# Patient Record
Sex: FEMALE | Race: WHITE | NOT HISPANIC OR LATINO | Employment: FULL TIME | URBAN - METROPOLITAN AREA
[De-identification: names, ages, dates, MRNs, and addresses within clinical notes are randomized per-mention and may not be internally consistent; named-entity substitution may affect disease eponyms.]

---

## 2018-01-17 NOTE — MISCELLANEOUS
Provider Comments   Patient an no show for 07/06/16 OV; [facility] to [facility]        Signatures   Electronically signed by : Mayda [facility]; Jul 15 2016  2:27PM EST                       (Author)    Electronically signed by : SANDRA Navarro ; Jul 15 2016  3:37PM EST                       (Author)

## 2018-05-12 ENCOUNTER — APPOINTMENT (EMERGENCY)
Dept: ULTRASOUND IMAGING | Facility: HOSPITAL | Age: 23
End: 2018-05-12
Payer: COMMERCIAL

## 2018-05-12 ENCOUNTER — HOSPITAL ENCOUNTER (EMERGENCY)
Facility: HOSPITAL | Age: 23
Discharge: HOME | End: 2018-05-12
Attending: EMERGENCY MEDICINE | Admitting: EMERGENCY MEDICINE
Payer: COMMERCIAL

## 2018-05-12 VITALS
WEIGHT: 190 LBS | SYSTOLIC BLOOD PRESSURE: 98 MMHG | DIASTOLIC BLOOD PRESSURE: 63 MMHG | HEIGHT: 65 IN | BODY MASS INDEX: 31.65 KG/M2 | RESPIRATION RATE: 18 BRPM | TEMPERATURE: 97 F | HEART RATE: 74 BPM | OXYGEN SATURATION: 99 %

## 2018-05-12 DIAGNOSIS — K29.70 GASTRITIS: Primary | ICD-10-CM

## 2018-05-12 DIAGNOSIS — R10.13 EPIGASTRIC PAIN: ICD-10-CM

## 2018-05-12 LAB
ABO GROUP BLD: NORMAL
ALBUMIN SERPL BCP-MCNC: 4 G/DL (ref 3.5–5)
ALP SERPL-CCNC: 54 U/L (ref 46–116)
ALT SERPL W P-5'-P-CCNC: 23 U/L (ref 12–78)
ANION GAP SERPL CALCULATED.3IONS-SCNC: 8 MMOL/L (ref 4–13)
APTT PPP: 26 SECONDS (ref 23–35)
AST SERPL W P-5'-P-CCNC: 17 U/L (ref 5–45)
BASOPHILS # BLD AUTO: 0.02 THOUSANDS/ΜL (ref 0–0.1)
BASOPHILS NFR BLD AUTO: 0 % (ref 0–1)
BILIRUB SERPL-MCNC: 0.3 MG/DL (ref 0.2–1)
BLD GP AB SCN SERPL QL: NEGATIVE
BUN SERPL-MCNC: 13 MG/DL (ref 5–25)
CALCIUM SERPL-MCNC: 9.1 MG/DL (ref 8.3–10.1)
CHLORIDE SERPL-SCNC: 103 MMOL/L (ref 100–108)
CO2 SERPL-SCNC: 30 MMOL/L (ref 21–32)
CREAT SERPL-MCNC: 0.71 MG/DL (ref 0.6–1.3)
EOSINOPHIL # BLD AUTO: 0.01 THOUSAND/ΜL (ref 0–0.61)
EOSINOPHIL NFR BLD AUTO: 0 % (ref 0–6)
ERYTHROCYTE [DISTWIDTH] IN BLOOD BY AUTOMATED COUNT: 12.4 % (ref 11.6–15.1)
GFR SERPL CREATININE-BSD FRML MDRD: 120 ML/MIN/1.73SQ M
GLUCOSE SERPL-MCNC: 115 MG/DL (ref 65–140)
HCG SERPL QL: NEGATIVE
HCT VFR BLD AUTO: 39.2 % (ref 34.8–46.1)
HGB BLD-MCNC: 13.5 G/DL (ref 11.5–15.4)
INR PPP: 1.05 (ref 0.86–1.16)
LIPASE SERPL-CCNC: 81 U/L (ref 73–393)
LYMPHOCYTES # BLD AUTO: 1.59 THOUSANDS/ΜL (ref 0.6–4.47)
LYMPHOCYTES NFR BLD AUTO: 16 % (ref 14–44)
MCH RBC QN AUTO: 31.9 PG (ref 26.8–34.3)
MCHC RBC AUTO-ENTMCNC: 34.4 G/DL (ref 31.4–37.4)
MCV RBC AUTO: 93 FL (ref 82–98)
MONOCYTES # BLD AUTO: 0.78 THOUSAND/ΜL (ref 0.17–1.22)
MONOCYTES NFR BLD AUTO: 8 % (ref 4–12)
NEUTROPHILS # BLD AUTO: 7.72 THOUSANDS/ΜL (ref 1.85–7.62)
NEUTS SEG NFR BLD AUTO: 76 % (ref 43–75)
PLATELET # BLD AUTO: 272 THOUSANDS/UL (ref 149–390)
PMV BLD AUTO: 10.1 FL (ref 8.9–12.7)
POTASSIUM SERPL-SCNC: 3.4 MMOL/L (ref 3.5–5.3)
PROT SERPL-MCNC: 8 G/DL (ref 6.4–8.2)
PROTHROMBIN TIME: 13.5 SECONDS (ref 12.1–14.4)
RBC # BLD AUTO: 4.23 MILLION/UL (ref 3.81–5.12)
RH BLD: POSITIVE
SODIUM SERPL-SCNC: 141 MMOL/L (ref 136–145)
SPECIMEN EXPIRATION DATE: NORMAL
WBC # BLD AUTO: 10.12 THOUSAND/UL (ref 4.31–10.16)

## 2018-05-12 PROCEDURE — 96375 TX/PRO/DX INJ NEW DRUG ADDON: CPT

## 2018-05-12 PROCEDURE — 93005 ELECTROCARDIOGRAM TRACING: CPT

## 2018-05-12 PROCEDURE — 99285 EMERGENCY DEPT VISIT HI MDM: CPT

## 2018-05-12 PROCEDURE — 96376 TX/PRO/DX INJ SAME DRUG ADON: CPT

## 2018-05-12 PROCEDURE — 84703 CHORIONIC GONADOTROPIN ASSAY: CPT | Performed by: EMERGENCY MEDICINE

## 2018-05-12 PROCEDURE — 83690 ASSAY OF LIPASE: CPT | Performed by: EMERGENCY MEDICINE

## 2018-05-12 PROCEDURE — 85610 PROTHROMBIN TIME: CPT | Performed by: EMERGENCY MEDICINE

## 2018-05-12 PROCEDURE — 76705 ECHO EXAM OF ABDOMEN: CPT

## 2018-05-12 PROCEDURE — 85730 THROMBOPLASTIN TIME PARTIAL: CPT | Performed by: EMERGENCY MEDICINE

## 2018-05-12 PROCEDURE — 86901 BLOOD TYPING SEROLOGIC RH(D): CPT | Performed by: EMERGENCY MEDICINE

## 2018-05-12 PROCEDURE — 96374 THER/PROPH/DIAG INJ IV PUSH: CPT

## 2018-05-12 PROCEDURE — 85025 COMPLETE CBC W/AUTO DIFF WBC: CPT | Performed by: EMERGENCY MEDICINE

## 2018-05-12 PROCEDURE — 80053 COMPREHEN METABOLIC PANEL: CPT | Performed by: EMERGENCY MEDICINE

## 2018-05-12 PROCEDURE — 36415 COLL VENOUS BLD VENIPUNCTURE: CPT | Performed by: EMERGENCY MEDICINE

## 2018-05-12 PROCEDURE — 96361 HYDRATE IV INFUSION ADD-ON: CPT

## 2018-05-12 PROCEDURE — 86900 BLOOD TYPING SEROLOGIC ABO: CPT | Performed by: EMERGENCY MEDICINE

## 2018-05-12 PROCEDURE — 86850 RBC ANTIBODY SCREEN: CPT | Performed by: EMERGENCY MEDICINE

## 2018-05-12 RX ORDER — MAGNESIUM HYDROXIDE/ALUMINUM HYDROXICE/SIMETHICONE 120; 1200; 1200 MG/30ML; MG/30ML; MG/30ML
20 SUSPENSION ORAL ONCE
Status: COMPLETED | OUTPATIENT
Start: 2018-05-12 | End: 2018-05-12

## 2018-05-12 RX ORDER — MORPHINE SULFATE 4 MG/ML
4 INJECTION, SOLUTION INTRAMUSCULAR; INTRAVENOUS ONCE
Status: COMPLETED | OUTPATIENT
Start: 2018-05-12 | End: 2018-05-12

## 2018-05-12 RX ORDER — ONDANSETRON 2 MG/ML
4 INJECTION INTRAMUSCULAR; INTRAVENOUS ONCE
Status: COMPLETED | OUTPATIENT
Start: 2018-05-12 | End: 2018-05-12

## 2018-05-12 RX ORDER — FENTANYL CITRATE 50 UG/ML
50 INJECTION, SOLUTION INTRAMUSCULAR; INTRAVENOUS
Status: DISCONTINUED | OUTPATIENT
Start: 2018-05-12 | End: 2018-05-12 | Stop reason: HOSPADM

## 2018-05-12 RX ADMIN — FENTANYL CITRATE 50 MCG: 50 INJECTION INTRAMUSCULAR; INTRAVENOUS at 08:40

## 2018-05-12 RX ADMIN — MORPHINE SULFATE 4 MG: 4 INJECTION, SOLUTION INTRAMUSCULAR; INTRAVENOUS at 08:04

## 2018-05-12 RX ADMIN — ALUMINUM HYDROXIDE, MAGNESIUM HYDROXIDE, AND SIMETHICONE 20 ML: 200; 200; 20 SUSPENSION ORAL at 10:45

## 2018-05-12 RX ADMIN — SODIUM CHLORIDE 1000 ML: 0.9 INJECTION, SOLUTION INTRAVENOUS at 09:16

## 2018-05-12 RX ADMIN — FENTANYL CITRATE 50 MCG: 50 INJECTION INTRAMUSCULAR; INTRAVENOUS at 09:51

## 2018-05-12 RX ADMIN — SODIUM CHLORIDE 1000 ML: 0.9 INJECTION, SOLUTION INTRAVENOUS at 08:02

## 2018-05-12 RX ADMIN — ONDANSETRON 4 MG: 2 INJECTION INTRAMUSCULAR; INTRAVENOUS at 08:04

## 2018-05-12 NOTE — ED PROVIDER NOTES
History  Chief Complaint   Patient presents with    Abdominal Pain     Patient presents to the ER stating she has had epigastric pain that radiates around to the back for the last 3 nights  has vomited 3 times  23 yo F with PMH of PCOS, hypothyroidism presents to ED with 3 nights of epigastric pain  Has been wakening her from sleep  She feels ok during the day, but has decreased appetite  She developed N/V today, 3 episodes, NBNB, which is new  No diarrhea/constipation  No fevers  No CP/SOB  No cough, though does report she had bronchitis 3-4 wks ago  No pelvic pain  No  complaints  She does have a h/o chlamydia and trich, since then she has always worn condoms during sexual activity  History provided by:  Patient and parent   used: No    Abdominal Pain   Pain location:  Epigastric and RUQ  Pain quality: sharp    Pain radiation: radiates in a band across back     Pain severity:  Moderate  Onset quality:  Gradual  Duration:  4 hours  Timing:  Intermittent  Chronicity:  New  Context: awakening from sleep    Context: not alcohol use, not diet changes, not eating, not previous surgeries, not recent illness, not recent sexual activity, not recent travel, not sick contacts and not trauma    Relieved by:  Nothing  Worsened by:  Nothing  Ineffective treatments:  Position changes, not moving, lying down and movement  Associated symptoms: nausea and vomiting    Associated symptoms: no chest pain, no chills, no constipation, no cough, no diarrhea, no fatigue, no fever, no hematuria, no shortness of breath, no sore throat, no vaginal bleeding and no vaginal discharge    Risk factors: obesity    Risk factors: no alcohol abuse, not pregnant and no recent hospitalization        None       Past Medical History:   Diagnosis Date    Disease of thyroid gland     GERD (gastroesophageal reflux disease)        Past Surgical History:   Procedure Laterality Date    TONSILLECTOMY         History reviewed  No pertinent family history  I have reviewed and agree with the history as documented  Social History   Substance Use Topics    Smoking status: Never Smoker    Smokeless tobacco: Never Used    Alcohol use Yes        Review of Systems   Constitutional: Positive for appetite change  Negative for chills, fatigue and fever  HENT: Negative for congestion, ear pain, rhinorrhea, sore throat, trouble swallowing and voice change  Eyes: Negative for pain and visual disturbance  Respiratory: Negative for cough, chest tightness and shortness of breath  Cardiovascular: Negative for chest pain, palpitations and leg swelling  Gastrointestinal: Positive for abdominal pain, nausea and vomiting  Negative for blood in stool, constipation and diarrhea  Endocrine: Negative for polyuria  Genitourinary: Negative for difficulty urinating, hematuria, vaginal bleeding and vaginal discharge  Musculoskeletal: Negative for back pain, neck pain and neck stiffness  Skin: Negative for rash  Neurological: Negative for dizziness, syncope, speech difficulty, light-headedness and headaches  Psychiatric/Behavioral: Negative for confusion and suicidal ideas  Physical Exam  ED Triage Vitals [05/12/18 0743]   Temperature Pulse Respirations Blood Pressure SpO2   (!) 97 °F (36 1 °C) 74 18 116/79 99 %      Temp Source Heart Rate Source Patient Position - Orthostatic VS BP Location FiO2 (%)   Temporal Monitor Lying Right arm --      Pain Score       8           Orthostatic Vital Signs  Vitals:    05/12/18 1030 05/12/18 1045 05/12/18 1100 05/12/18 1115   BP: 109/66 109/64 109/65 98/63   Pulse: 72 73 77 74   Patient Position - Orthostatic VS: Lying Lying Lying Lying       Physical Exam   Constitutional: She is oriented to person, place, and time  She appears well-developed and well-nourished  No distress  HENT:   Head: Normocephalic and atraumatic     Right Ear: External ear normal    Left Ear: External ear normal    Nose: Nose normal    Mouth/Throat: Oropharynx is clear and moist    Eyes: Conjunctivae and EOM are normal  Pupils are equal, round, and reactive to light  Right eye exhibits no discharge  Left eye exhibits no discharge  No scleral icterus  Neck: Normal range of motion  Neck supple  No tracheal deviation present  Cardiovascular: Normal rate, regular rhythm, normal heart sounds and intact distal pulses  Exam reveals no gallop and no friction rub  No murmur heard  Pulmonary/Chest: Effort normal and breath sounds normal  No stridor  No respiratory distress  She exhibits no tenderness  Abdominal: Soft  Bowel sounds are normal  There is tenderness (moderate RUQ, mild epigastric  )  There is no rebound and no guarding  No cva tenderness   Musculoskeletal: Normal range of motion  She exhibits no edema or deformity  Lymphadenopathy:     She has no cervical adenopathy  Neurological: She is alert and oriented to person, place, and time  No cranial nerve deficit or sensory deficit  Coordination normal    Skin: Skin is warm and dry  No rash noted  She is not diaphoretic  Psychiatric: She has a normal mood and affect  Her behavior is normal    Nursing note and vitals reviewed        ED Medications  Medications   fentanyl citrate (PF) 100 MCG/2ML 50 mcg (50 mcg Intravenous Given 5/12/18 0951)   sodium chloride 0 9 % bolus 1,000 mL (0 mL Intravenous Stopped 5/12/18 0915)   morphine (PF) 4 mg/mL injection 4 mg (4 mg Intravenous Given 5/12/18 0804)   ondansetron (ZOFRAN) injection 4 mg (4 mg Intravenous Given 5/12/18 0804)   sodium chloride 0 9 % bolus 1,000 mL (0 mL Intravenous Stopped 5/12/18 1045)   aluminum-magnesium hydroxide-simethicone (MYLANTA) 200-200-20 mg/5 mL oral suspension 20 mL (20 mL Oral Given 5/12/18 1045)       Diagnostic Studies  Results Reviewed     Procedure Component Value Units Date/Time    Pregnancy Test (HCG Qualitative) [[id number]]  (Normal) Collected:  05/12/18 0751    Lab Status:  Final result Specimen:  Blood from Arm, Right Updated:  05/12/18 0921     Preg, Serum Negative    Comprehensive metabolic panel [[id number]]  (Abnormal) Collected:  05/12/18 0758    Lab Status:  Final result Specimen:  Blood from Arm, Right Updated:  05/12/18 0825     Sodium 141 mmol/L      Potassium 3 4 (L) mmol/L      Chloride 103 mmol/L      CO2 30 mmol/L      Anion Gap 8 mmol/L      BUN 13 mg/dL      Creatinine 0 71 mg/dL      Glucose 115 mg/dL      Calcium 9 1 mg/dL      AST 17 U/L      ALT 23 U/L      Alkaline Phosphatase 54 U/L      Total Protein 8 0 g/dL      Albumin 4 0 g/dL      Total Bilirubin 0 30 mg/dL      eGFR 120 ml/min/1 73sq m     Narrative:         National Kidney Disease Education Program recommendations are as follows:  GFR calculation is accurate only with a steady state creatinine  Chronic Kidney disease less than 60 ml/min/1 73 sq  meters  Kidney failure less than 15 ml/min/1 73 sq  meters      Lipase [[id number]]  (Normal) Collected:  05/12/18 0758    Lab Status:  Final result Specimen:  Blood from Arm, Right Updated:  05/12/18 0825     Lipase 81 u/L     Protime-INR [[id number]]  (Normal) Collected:  05/12/18 0758    Lab Status:  Final result Specimen:  Blood from Arm, Right Updated:  05/12/18 0818     Protime 13 5 seconds      INR 1 05    APTT [[id number]]  (Normal) Collected:  05/12/18 0758    Lab Status:  Final result Specimen:  Blood from Arm, Right Updated:  05/12/18 0818     PTT 26 seconds     CBC and differential [[id number]]  (Abnormal) Collected:  05/12/18 0758    Lab Status:  Final result Specimen:  Blood from Arm, Right Updated:  05/12/18 0805     WBC 10 12 Thousand/uL      RBC 4 23 Million/uL      Hemoglobin 13 5 g/dL      Hematocrit 39 2 %      MCV 93 fL      MCH 31 9 pg      MCHC 34 4 g/dL      RDW 12 4 %      MPV 10 1 fL      Platelets 622 Thousands/uL      Neutrophils Relative 76 (H) %      Lymphocytes Relative 16 %      Monocytes Relative 8 %      Eosinophils Relative 0 % Basophils Relative 0 %      Neutrophils Absolute 7 72 (H) Thousands/µL      Lymphocytes Absolute 1 59 Thousands/µL      Monocytes Absolute 0 78 Thousand/µL      Eosinophils Absolute 0 01 Thousand/µL      Basophils Absolute 0 02 Thousands/µL     POCT pregnancy, urine [[id number]]     Lab Status:  No result     POCT urinalysis dipstick [[id number]]     Lab Status:  No result Specimen:  Urine                  US gallbladder   Final Result by Oli Matute MD (05/12 1023)      Gallbladder sludge  No wall thickening or pericholecystic fluid  Workstation performed: [id number]                    Procedures  ECG 12 Lead Documentation  Date/Time: 5/12/2018 7:57 AM  Performed by: Magdiel Lozoya  Authorized by: Magdiel Lozoya     Indications / Diagnosis:  Epigastric abd pain  ECG reviewed by me, the ED Provider: yes    Patient location:  ED  Previous ECG:     Previous ECG:  Unavailable    Comparison to cardiac monitor: Yes    Interpretation:     Interpretation: normal    Rate:     ECG rate:  63    ECG rate assessment: normal    Rhythm:     Rhythm: sinus rhythm    Ectopy:     Ectopy: none    QRS:     QRS axis:  Normal  Conduction:     Conduction: normal    ST segments:     ST segments:  Normal  T waves:     T waves: normal             Phone Contacts  ED Phone Contact    ED Course  ED Course as of May 12 1149   Sat May 12, 2018   0802 Exam as noted  Pt in no distress  Pain/nausea meds orderd  NPO  IVF running  Diff dx includes cholelithiasis/cystis, pancreatitis, gastritis, GERD  Less likely ACS, PE (PERC 0), PNA, PTX  Will check labs, bedside U/S     8903 RUQ bedside U/S concerning for cholelithiasis  No pericholecystic fluid noted  Discussed w/ U/S tech - will be here for U/S ASAP     1026 U/S noted  No cholelithiasis - just sludge  1037 Pt feeling improved  Discussed conservative management for possible gastritis with maalox vs CT  Pt and family would like to try maalox at this time and reassess      Ny Pain now 2/10 after maalox (from 8/10 before)  Will try PO intake  Pt resting comfortably, texting on her phone  1148 Pt improved  Tolerated PO (water, pretzal, saltines)  Wants to go home  Discused fu with PCP, GI if needed  Anyworsening sx - RTED  Pt and parents agree with plan  MDM  Number of Diagnoses or Management Options  Epigastric pain: new and requires workup  Gastritis: new and requires workup     Amount and/or Complexity of Data Reviewed  Clinical lab tests: ordered  Tests in the radiology section of CPT®: ordered and reviewed  Tests in the medicine section of CPT®: ordered and reviewed  Decide to obtain previous medical records or to obtain history from someone other than the patient: yes  Review and summarize past medical records: yes  Independent visualization of images, tracings, or specimens: yes    Risk of Complications, Morbidity, and/or Mortality  Presenting problems: high  Diagnostic procedures: moderate  Management options: moderate    Patient Progress  Patient progress: improved    CritCare Time    Disposition  Final diagnoses:   Gastritis   Epigastric pain     Time reflects when diagnosis was documented in both MDM as applicable and the Disposition within this note     Time User Action Codes Description Comment    5/12/2018 11:36 AM Gabino ROSA Add [K29 70] Gastritis     5/12/2018 11:36 AM Abdi Clark Add [R10 13] Epigastric pain       ED Disposition     ED Disposition Condition Comment    Discharge  Han Robrets discharge to home/self care      Condition at discharge: Good        Follow-up Information     Follow up With Specialties Details Why Contact Info Additional Information    Suki Barrett MD Family Medicine Schedule an appointment as soon as possible for a visit  [address] Emergency Department Emergency Medicine  If symptoms worsen [address] [facility]  [address] [facility] [address], [address], [place], [place], [address] [facility]  As needed [address]  [phone]         Patient's Medications   Discharge Prescriptions    ALUMINUM-MAGNESIUM HYDROXIDE 200-200 MG/5ML SUSPENSION    Take 15 mL by mouth every 6 (six) hours as needed for heartburn       Start Date: 5/12/2018 End Date: --       Order Dose: 15 mL       Quantity: 355 mL    Refills: 0     No discharge procedures on file      ED Provider  Electronically Signed by           Sabine Restrepo MD  05/12/18 0471

## 2018-05-12 NOTE — DISCHARGE INSTRUCTIONS
Abdominal Pain   WHAT YOU NEED TO KNOW:   Abdominal pain can be dull, achy, or sharp  You may have pain in one area of your abdomen, or in your entire abdomen  Your pain may be caused by a condition such as constipation, food sensitivity or poisoning, infection, or a blockage  Abdominal pain can also be from a hernia, appendicitis, or an ulcer  Liver, gallbladder, or kidney conditions can also cause abdominal pain  The cause of your abdominal pain may be unknown  DISCHARGE INSTRUCTIONS:   Return to the emergency department if:   · You have new chest pain or shortness of breath  · You have pulsing pain in your upper abdomen or lower back that suddenly becomes constant  · Your pain is in the right lower abdominal area and worsens with movement  · You have a fever over 100 4°F (38°C) or shaking chills  · You are vomiting and cannot keep food or liquids down  · Your pain does not improve or gets worse over the next 8 to 12 hours  · You see blood in your vomit or bowel movements, or they look black and tarry  · Your skin or the whites of your eyes turn yellow  · You are a woman and have a large amount of vaginal bleeding that is not your monthly period  Contact your healthcare provider if:   · You have pain in your lower back  · You are a man and have pain in your testicles  · You have pain when you urinate  · You have questions or concerns about your condition or care  Follow up with your healthcare provider within 24 hours or as directed:  Write down your questions so you remember to ask them during your visits  Medicines:   · Medicines  may be given to calm your stomach and prevent vomiting or to decrease pain  Ask how to take pain medicine safely  · Take your medicine as directed  Contact your healthcare provider if you think your medicine is not helping or if you have side effects  Tell him of her if you are allergic to any medicine   Keep a list of the medicines, vitamins, and herbs you take  Include the amounts, and when and why you take them  Bring the list or the pill bottles to follow-up visits  Carry your medicine list with you in case of an emergency  © 2017 [address] Information is for End User's use only and may not be sold, redistributed or otherwise used for commercial purposes  All illustrations and images included in CareNotes® are the copyrighted property of A [employer] M , [employer]  or [employer]  The above information is an  only  It is not intended as medical advice for individual conditions or treatments  Talk to your doctor, nurse or pharmacist before following any medical regimen to see if it is safe and effective for you  Epigastric Pain   WHAT YOU NEED TO KNOW:   What do I need to know about epigastric pain? Epigastric pain is felt in the middle of the upper abdomen, between the ribs and the bellybutton  The pain may be mild or severe  Pain may spread from or to another part of your body  Epigastric pain may be a sign of a serious health problem that needs to be treated  What causes epigastric pain? The cause of your pain may not be known  The following are common causes:  · Inflammation of your stomach, liver, pancreas, or intestines    · Heart problems, such as a heart attack    · Digestion problems, such as indigestion, GERD, or lactose intolerance    · Medical conditions, such as an ulcer, a hernia, irritable bowel syndrome (IBS), or cancer    · A blockage in your bowels or gallbladder    · A bladder infection    · An injury or previous surgery in your abdomen  What other signs and symptoms may I have with epigastric pain? Signs and symptoms will depend on what is causing your pain    · Nausea, vomiting, bloating, constipation, or diarrhea    · Loss of appetite, weight loss, feeling of fullness as you start to eat    · Movement relieves the pain or makes it worse, or only certain positions are comfortable    · Pain when you eat, or pain that is relieved when you eat or have a bowel movement    · Sore throat or a hoarse voice  How is epigastric pain diagnosed and treated? Your healthcare provider will feel your abdomen to see if it is tender or rigid  He may change or stop any medicine you are taking that is causing your pain  Your pain may go away without treatment, or you may need any of the following:  · Medicines  may be given to treat pain or stop vomiting  You may also need medicines to reduce or control stomach acid, or treat an infection  · Blood or urine tests  may show problems such as infection or inflammation  The tests may also show how well your liver is working  · An x-ray  is used to check your kidneys and bladder  · An ultrasound  is used to check your gallbladder for stones or other blockage  · A bowel movement sample  may be tested for blood  How can I manage my symptoms? · Keep a record of your symptoms  Include when the pain starts, how long it lasts, and if it is sharp or dull  Also include any foods you ate or activities you did before the pain started  Keep track of anything that helped the pain  · Eat a variety of healthy foods  Healthy foods include fruits, vegetables, whole-grain breads, low-fat dairy products, beans, lean meats, and fish  Ask if you need to be on a special diet  Certain foods may cause your pain, such as alcohol or foods that are high in fat  You may need to eat smaller meals and to eat more often than usual     · Drink liquids as directed  Ask how much liquid to drink each day and which liquids are best for you  Do not have drinks that contain alcohol or caffeine    Call 911 for any of the following:   · You have any of the following signs of a heart attack:      ¨ Squeezing, pressure, or pain in your chest that lasts longer than 5 minutes or returns    ¨ Discomfort or pain in your back, neck, jaw, stomach, or arm     ¨ Trouble breathing    ¨ Nausea or vomiting    ¨ Lightheadedness or a sudden cold sweat, especially with chest pain or trouble breathing    · You have severe pain that radiates to your jaw or back  When should I seek immediate care? · You have severe pain that starts suddenly and quickly gets worse  · You cannot have a bowel movement and are vomiting  · You vomit or cough up blood  · You see blood in your urine or bowel movement  · You feel drowsy and your breathing is slower than usual   When should I contact my healthcare provider? · You have a fever or chills  · You have yellowing of your skin or the whites of your eyes  · You vomit often or several times in a row  · You lose weight without trying  · You have symptoms for longer than 2 weeks  · You have questions or concerns about your condition or care  CARE AGREEMENT:   You have the right to help plan your care  Learn about your health condition and how it may be treated  Discuss treatment options with your caregivers to decide what care you want to receive  You always have the right to refuse treatment  The above information is an  only  It is not intended as medical advice for individual conditions or treatments  Talk to your doctor, nurse or pharmacist before following any medical regimen to see if it is safe and effective for you  © 2017 [address] Information is for End User's use only and may not be sold, redistributed or otherwise used for commercial purposes  All illustrations and images included in CareNotes® are the copyrighted property of [employer]  or [employer]  Gastritis   AMBULATORY CARE:   Gastritis  is inflammation or irritation of the lining of your stomach          Common symptoms include the following:   · Stomach pain, burning, or tenderness when you press on it    · Stomach fullness or tightness    · Nausea or vomiting    · Loss of appetite, or feeling full quickly when you eat    · Bad breath    · Fatigue or feeling more tired than usual    · Heartburn  Call 911 for any of the following:   · You develop chest pain or shortness of breath  Seek care immediately if:   · You vomit blood  · You have black or bloody bowel movements  · You have severe stomach or back pain  Contact your healthcare provider if:   · You have a fever  · You have new or worsening symptoms, even after treatment  · You have questions or concerns about your condition or care  Treatment for gastritis:  Your symptoms may go away without treatment  Treatment will depend on what is causing your gastritis  Your healthcare provider may recommend changes to the medicines you take  Medicines may be given to help treat a bacterial infection or decrease stomach acid  Manage or prevent gastritis:   · Do not smoke  Nicotine and other chemicals in cigarettes and cigars can make your symptoms worse and cause lung damage  Ask your healthcare provider for information if you currently smoke and need help to quit  E-cigarettes or smokeless tobacco still contain nicotine  Talk to your healthcare provider before you use these products  · Do not drink alcohol  Alcohol can prevent healing and make your gastritis worse  Talk to your healthcare provider if you need help to stop drinking  · Do not take NSAIDs or aspirin unless directed  These and similar medicines can cause irritation  If your healthcare provider says it is okay to take NSAIDs, take them with food  · Do not eat foods that cause irritation  Foods such as oranges and salsa can cause burning or pain  Eat a variety of healthy foods  Examples include fruits (not citrus), vegetables, low-fat dairy products, beans, whole-grain breads, and lean meats and fish  Try to eat small meals, and drink water with your meals  Do not eat for at least 3 hours before you go to bed  · Find ways to relax and decrease stress    Stress can increase stomach acid and make gastritis worse  Activities such as yoga, meditation, or listening to music can help you relax  Spend time with friends, or do things you enjoy  Follow up with your healthcare provider as directed: You may need ongoing tests or treatment, or referral to a gastroenterologist  Write down your questions so you remember to ask them during your visits  © 2017 [address] Information is for End User's use only and may not be sold, redistributed or otherwise used for commercial purposes  All illustrations and images included in CareNotes® are the copyrighted property of A [employer]  or [employer]  The above information is an  only  It is not intended as medical advice for individual conditions or treatments  Talk to your doctor, nurse or pharmacist before following any medical regimen to see if it is safe and effective for you

## 2018-05-13 LAB
ATRIAL RATE: 63 BPM
P AXIS: 37 DEGREES
PR INTERVAL: 182 MS
QRS AXIS: 83 DEGREES
QRSD INTERVAL: 94 MS
QT INTERVAL: 410 MS
QTC INTERVAL: 419 MS
T WAVE AXIS: 52 DEGREES
VENTRICULAR RATE: 63 BPM

## 2018-05-13 PROCEDURE — 93010 ELECTROCARDIOGRAM REPORT: CPT | Performed by: INTERNAL MEDICINE

## 2018-05-17 ENCOUNTER — HOSPITAL ENCOUNTER (INPATIENT)
Facility: HOSPITAL | Age: 23
LOS: 1 days | Discharge: HOME | DRG: 416 | End: 2018-05-18
Attending: EMERGENCY MEDICINE | Admitting: SURGERY
Payer: COMMERCIAL

## 2018-05-17 ENCOUNTER — APPOINTMENT (EMERGENCY)
Dept: ULTRASOUND IMAGING | Facility: HOSPITAL | Age: 23
DRG: 416 | End: 2018-05-17
Payer: COMMERCIAL

## 2018-05-17 ENCOUNTER — OFFICE VISIT (OUTPATIENT)
Dept: FAMILY MEDICINE CLINIC | Facility: CLINIC | Age: 23
End: 2018-05-17
Payer: COMMERCIAL

## 2018-05-17 ENCOUNTER — ANESTHESIA EVENT (INPATIENT)
Dept: PERIOP | Facility: HOSPITAL | Age: 23
DRG: 416 | End: 2018-05-17
Payer: COMMERCIAL

## 2018-05-17 VITALS
WEIGHT: 188 LBS | RESPIRATION RATE: 18 BRPM | HEIGHT: 67 IN | HEART RATE: 72 BPM | DIASTOLIC BLOOD PRESSURE: 67 MMHG | SYSTOLIC BLOOD PRESSURE: 124 MMHG | BODY MASS INDEX: 29.51 KG/M2

## 2018-05-17 DIAGNOSIS — R10.11 COLICKY RUQ ABDOMINAL PAIN: ICD-10-CM

## 2018-05-17 DIAGNOSIS — D72.829 LEUKOCYTOSIS: ICD-10-CM

## 2018-05-17 DIAGNOSIS — K81.0 ACUTE CHOLECYSTITIS: Primary | ICD-10-CM

## 2018-05-17 DIAGNOSIS — R10.11 RUQ PAIN: Primary | ICD-10-CM

## 2018-05-17 LAB
ALBUMIN SERPL BCP-MCNC: 3.8 G/DL (ref 3.5–5)
ALP SERPL-CCNC: 52 U/L (ref 46–116)
ALT SERPL W P-5'-P-CCNC: 20 U/L (ref 12–78)
ANION GAP SERPL CALCULATED.3IONS-SCNC: 11 MMOL/L (ref 4–13)
AST SERPL W P-5'-P-CCNC: 21 U/L (ref 5–45)
BASOPHILS # BLD AUTO: 0.01 THOUSANDS/ΜL (ref 0–0.1)
BASOPHILS NFR BLD AUTO: 0 % (ref 0–1)
BILIRUB SERPL-MCNC: 0.5 MG/DL (ref 0.2–1)
BUN SERPL-MCNC: 8 MG/DL (ref 5–25)
CALCIUM SERPL-MCNC: 8.9 MG/DL (ref 8.3–10.1)
CHLORIDE SERPL-SCNC: 98 MMOL/L (ref 100–108)
CLARITY, POC: CLEAR
CO2 SERPL-SCNC: 26 MMOL/L (ref 21–32)
COLOR, POC: YELLOW
CREAT SERPL-MCNC: 0.57 MG/DL (ref 0.6–1.3)
EOSINOPHIL # BLD AUTO: 0 THOUSAND/ΜL (ref 0–0.61)
EOSINOPHIL NFR BLD AUTO: 0 % (ref 0–6)
ERYTHROCYTE [DISTWIDTH] IN BLOOD BY AUTOMATED COUNT: 12 % (ref 11.6–15.1)
EXT BILIRUBIN, UA: NORMAL
EXT BLOOD URINE: NORMAL
EXT GLUCOSE, UA: NORMAL
EXT KETONES: 160
EXT NITRITE, UA: NORMAL
EXT PH, UA: 6.5
EXT PREG TEST URINE: NORMAL
EXT PROTEIN, UA: NORMAL
EXT SPECIFIC GRAVITY, UA: 1.01
EXT UROBILINOGEN: 0.2
GFR SERPL CREATININE-BSD FRML MDRD: 130 ML/MIN/1.73SQ M
GLUCOSE SERPL-MCNC: 110 MG/DL (ref 65–140)
HCT VFR BLD AUTO: 38.4 % (ref 34.8–46.1)
HGB BLD-MCNC: 13.7 G/DL (ref 11.5–15.4)
LIPASE SERPL-CCNC: 113 U/L (ref 73–393)
LYMPHOCYTES # BLD AUTO: 0.67 THOUSANDS/ΜL (ref 0.6–4.47)
LYMPHOCYTES NFR BLD AUTO: 4 % (ref 14–44)
MCH RBC QN AUTO: 32 PG (ref 26.8–34.3)
MCHC RBC AUTO-ENTMCNC: 35.7 G/DL (ref 31.4–37.4)
MCV RBC AUTO: 90 FL (ref 82–98)
MONOCYTES # BLD AUTO: 1.1 THOUSAND/ΜL (ref 0.17–1.22)
MONOCYTES NFR BLD AUTO: 7 % (ref 4–12)
NEUTROPHILS # BLD AUTO: 14.96 THOUSANDS/ΜL (ref 1.85–7.62)
NEUTS SEG NFR BLD AUTO: 89 % (ref 43–75)
PLATELET # BLD AUTO: 276 THOUSANDS/UL (ref 149–390)
PMV BLD AUTO: 10.7 FL (ref 8.9–12.7)
POTASSIUM SERPL-SCNC: 3.5 MMOL/L (ref 3.5–5.3)
PROT SERPL-MCNC: 7.9 G/DL (ref 6.4–8.2)
RBC # BLD AUTO: 4.28 MILLION/UL (ref 3.81–5.12)
SODIUM SERPL-SCNC: 135 MMOL/L (ref 136–145)
WBC # BLD AUTO: 16.74 THOUSAND/UL (ref 4.31–10.16)
WBC # BLD EST: NORMAL 10*3/UL

## 2018-05-17 PROCEDURE — 96374 THER/PROPH/DIAG INJ IV PUSH: CPT

## 2018-05-17 PROCEDURE — 83690 ASSAY OF LIPASE: CPT | Performed by: EMERGENCY MEDICINE

## 2018-05-17 PROCEDURE — 36415 COLL VENOUS BLD VENIPUNCTURE: CPT | Performed by: EMERGENCY MEDICINE

## 2018-05-17 PROCEDURE — 96361 HYDRATE IV INFUSION ADD-ON: CPT

## 2018-05-17 PROCEDURE — 81002 URINALYSIS NONAUTO W/O SCOPE: CPT | Performed by: EMERGENCY MEDICINE

## 2018-05-17 PROCEDURE — 99214 OFFICE O/P EST MOD 30 MIN: CPT | Performed by: NURSE PRACTITIONER

## 2018-05-17 PROCEDURE — 96375 TX/PRO/DX INJ NEW DRUG ADDON: CPT

## 2018-05-17 PROCEDURE — 81025 URINE PREGNANCY TEST: CPT | Performed by: EMERGENCY MEDICINE

## 2018-05-17 PROCEDURE — 80053 COMPREHEN METABOLIC PANEL: CPT | Performed by: EMERGENCY MEDICINE

## 2018-05-17 PROCEDURE — 1111F DSCHRG MED/CURRENT MED MERGE: CPT | Performed by: NURSE PRACTITIONER

## 2018-05-17 PROCEDURE — 99222 1ST HOSP IP/OBS MODERATE 55: CPT | Performed by: SURGERY

## 2018-05-17 PROCEDURE — 85025 COMPLETE CBC W/AUTO DIFF WBC: CPT | Performed by: EMERGENCY MEDICINE

## 2018-05-17 PROCEDURE — 99285 EMERGENCY DEPT VISIT HI MDM: CPT

## 2018-05-17 PROCEDURE — 76705 ECHO EXAM OF ABDOMEN: CPT

## 2018-05-17 RX ORDER — RANITIDINE HYDROCHLORIDE 25 MG/ML
INJECTION, SOLUTION INTRAMUSCULAR; INTRAVENOUS
COMMUNITY
End: 2018-09-13 | Stop reason: ALTCHOICE

## 2018-05-17 RX ORDER — ONDANSETRON 2 MG/ML
4 INJECTION INTRAMUSCULAR; INTRAVENOUS EVERY 6 HOURS PRN
Status: DISCONTINUED | OUTPATIENT
Start: 2018-05-17 | End: 2018-05-18 | Stop reason: HOSPADM

## 2018-05-17 RX ORDER — ASCORBIC ACID 100 MG
TABLET,CHEWABLE ORAL
COMMUNITY
End: 2018-09-13 | Stop reason: ALTCHOICE

## 2018-05-17 RX ORDER — SODIUM CHLORIDE 9 MG/ML
125 INJECTION, SOLUTION INTRAVENOUS CONTINUOUS
Status: DISCONTINUED | OUTPATIENT
Start: 2018-05-17 | End: 2018-05-17

## 2018-05-17 RX ORDER — ONDANSETRON 2 MG/ML
4 INJECTION INTRAMUSCULAR; INTRAVENOUS ONCE
Status: COMPLETED | OUTPATIENT
Start: 2018-05-17 | End: 2018-05-17

## 2018-05-17 RX ORDER — HEPARIN SODIUM 5000 [USP'U]/ML
5000 INJECTION, SOLUTION INTRAVENOUS; SUBCUTANEOUS EVERY 8 HOURS SCHEDULED
Status: DISCONTINUED | OUTPATIENT
Start: 2018-05-17 | End: 2018-05-18

## 2018-05-17 RX ORDER — SODIUM CHLORIDE 9 MG/ML
125 INJECTION, SOLUTION INTRAVENOUS CONTINUOUS
Status: DISCONTINUED | OUTPATIENT
Start: 2018-05-17 | End: 2018-05-18 | Stop reason: HOSPADM

## 2018-05-17 RX ORDER — FENTANYL CITRATE 50 UG/ML
50 INJECTION, SOLUTION INTRAMUSCULAR; INTRAVENOUS ONCE
Status: COMPLETED | OUTPATIENT
Start: 2018-05-17 | End: 2018-05-17

## 2018-05-17 RX ORDER — MORPHINE SULFATE 4 MG/ML
4 INJECTION, SOLUTION INTRAMUSCULAR; INTRAVENOUS ONCE
Status: COMPLETED | OUTPATIENT
Start: 2018-05-17 | End: 2018-05-17

## 2018-05-17 RX ORDER — KETOROLAC TROMETHAMINE 30 MG/ML
30 INJECTION, SOLUTION INTRAMUSCULAR; INTRAVENOUS ONCE
Status: COMPLETED | OUTPATIENT
Start: 2018-05-17 | End: 2018-05-17

## 2018-05-17 RX ORDER — ACETAMINOPHEN 160 MG
TABLET,DISINTEGRATING ORAL DAILY
COMMUNITY
End: 2018-09-13 | Stop reason: ALTCHOICE

## 2018-05-17 RX ADMIN — SODIUM CHLORIDE 125 ML/HR: 0.9 INJECTION, SOLUTION INTRAVENOUS at 18:07

## 2018-05-17 RX ADMIN — FENTANYL CITRATE 50 MCG: 50 INJECTION, SOLUTION INTRAMUSCULAR; INTRAVENOUS at 17:07

## 2018-05-17 RX ADMIN — HEPARIN SODIUM 5000 UNITS: 5000 INJECTION, SOLUTION INTRAVENOUS; SUBCUTANEOUS at 22:13

## 2018-05-17 RX ADMIN — ONDANSETRON 4 MG: 2 INJECTION INTRAMUSCULAR; INTRAVENOUS at 12:51

## 2018-05-17 RX ADMIN — HEPARIN SODIUM 5000 UNITS: 5000 INJECTION, SOLUTION INTRAVENOUS; SUBCUTANEOUS at 17:27

## 2018-05-17 RX ADMIN — PIPERACILLIN SODIUM AND TAZOBACTAM SODIUM 3.38 G: 3; .375 INJECTION, POWDER, LYOPHILIZED, FOR SOLUTION INTRAVENOUS at 17:17

## 2018-05-17 RX ADMIN — PIPERACILLIN SODIUM AND TAZOBACTAM SODIUM 3.38 G: 3; .375 INJECTION, POWDER, LYOPHILIZED, FOR SOLUTION INTRAVENOUS at 22:12

## 2018-05-17 RX ADMIN — MORPHINE SULFATE 4 MG: 4 INJECTION, SOLUTION INTRAMUSCULAR; INTRAVENOUS at 12:51

## 2018-05-17 RX ADMIN — SODIUM CHLORIDE 125 ML/HR: 0.9 INJECTION, SOLUTION INTRAVENOUS at 16:52

## 2018-05-17 RX ADMIN — SODIUM CHLORIDE 1000 ML: 0.9 INJECTION, SOLUTION INTRAVENOUS at 12:51

## 2018-05-17 RX ADMIN — SODIUM CHLORIDE, SODIUM LACTATE, POTASSIUM CHLORIDE, AND CALCIUM CHLORIDE 1000 ML: .6; .31; .03; .02 INJECTION, SOLUTION INTRAVENOUS at 17:15

## 2018-05-17 RX ADMIN — FENTANYL CITRATE 50 MCG: 50 INJECTION, SOLUTION INTRAMUSCULAR; INTRAVENOUS at 14:07

## 2018-05-17 RX ADMIN — METRONIDAZOLE 500 MG: 500 INJECTION, SOLUTION INTRAVENOUS at 18:07

## 2018-05-17 RX ADMIN — HYDROMORPHONE HYDROCHLORIDE 1 MG: 1 INJECTION, SOLUTION INTRAMUSCULAR; INTRAVENOUS; SUBCUTANEOUS at 18:09

## 2018-05-17 RX ADMIN — ONDANSETRON HYDROCHLORIDE 4 MG: 2 INJECTION, SOLUTION INTRAMUSCULAR; INTRAVENOUS at 19:18

## 2018-05-17 RX ADMIN — KETOROLAC TROMETHAMINE 30 MG: 30 INJECTION, SOLUTION INTRAMUSCULAR; INTRAVENOUS at 14:07

## 2018-05-17 NOTE — ED NOTES
Dr Calles Dana at bedside to discuss surgery for patient        Amy Reyes RN  05/17/18 Kia Horton, RN  05/17/18 5582

## 2018-05-17 NOTE — ED NOTES
Denies any nausea  Has not vomited  Awaiting US of gallbladder  HOB elevated       Kelly Gaston, RN  05/17/18 4353

## 2018-05-17 NOTE — ED PROVIDER NOTES
History  Chief Complaint   Patient presents with    Abdominal Pain     Patient reports intermittent RUQ pain since last weds  Was seen here sat and told that she has "sludge in my gallbaldder " Patient reports pain subsided last night after eating a bland diet  Ate sausage sandwich last night around 1900  Had increased pain in RUQ since last night  Seen by PCP today and sent here for CT     Patient reports intermittent RUQ pain since last   Was seen here sat and told that she has "sludge in my gallbaldder " Patient reports pain subsided last night after eating a bland diet  Had increased pain in RUQ since last night ~ 2300 about 4 hours after eating sausage sandwhich  Seen by PCP today and sent here for CT  Discussed that she has + [place] and do not feel she needs radiation  History provided by:  Patient   used: No    Abdominal Pain   Pain location:  RUQ  Pain quality: aching    Pain radiates to:  Does not radiate  Pain severity:  Severe  Onset quality:  Sudden  Duration:  13 hours  Progression:  Worsening  Chronicity:  Recurrent  Context: suspicious food intake    Relieved by:  Nothing  Worsened by:  Eating  Ineffective treatments:  None tried  Associated symptoms: anorexia and nausea    Associated symptoms: no chest pain, no chills, no diarrhea, no dysuria, no fatigue, no fever, no hematuria, no shortness of breath, no sore throat, no vaginal bleeding, no vaginal discharge and no vomiting    Risk factors: has not had multiple surgeries and not pregnant        Prior to Admission Medications   Prescriptions Last Dose Informant Patient Reported? Taking?    Ascorbic Acid (VITAMIN C) 100 MG CHEW   Yes No   Sig: Vitamin C   Cholecalciferol (VITAMIN D3) 2000 units capsule   Yes No   Sig: Daily   ranitidine (ZANTAC) 1000 MG/40ML SOLN   Yes No   Si TAB AS NEEDED      Facility-Administered Medications: None       Past Medical History:   Diagnosis Date    Allergic     Anxiety     Disease of thyroid gland     GERD (gastroesophageal reflux disease)        Past Surgical History:   Procedure Laterality Date    TONSILLECTOMY         Family History   Problem Relation Age of Onset    Substance Abuse Neg Hx     Mental illness Neg Hx      I have reviewed and agree with the history as documented  Social History   Substance Use Topics    Smoking status: Former Smoker    Smokeless tobacco: Never Used    Alcohol use Yes        Review of Systems   Constitutional: Positive for appetite change  Negative for chills, fatigue and fever  HENT: Negative for sore throat  Eyes: Negative for visual disturbance  Respiratory: Negative for shortness of breath  Cardiovascular: Negative for chest pain  Gastrointestinal: Positive for abdominal pain (RUQ abd pain), anorexia and nausea  Negative for diarrhea and vomiting  Genitourinary: Negative for dysuria, frequency, hematuria, vaginal bleeding and vaginal discharge  Musculoskeletal: Negative for back pain, neck pain and neck stiffness  Skin: Negative for pallor and rash  Allergic/Immunologic: Negative for immunocompromised state  Neurological: Negative for light-headedness and headaches  Psychiatric/Behavioral: Negative for confusion  All other systems reviewed and are negative  Physical Exam  ED Triage Vitals [05/17/18 1226]   Temperature Pulse Respirations Blood Pressure SpO2   98 8 °F (37 1 °C) 84 18 127/71 100 %      Temp Source Heart Rate Source Patient Position - Orthostatic VS BP Location FiO2 (%)   Temporal Monitor Sitting Right arm --      Pain Score       Worst Possible Pain           Orthostatic Vital Signs  Vitals:    05/17/18 1226 05/17/18 1345   BP: 127/71 120/74   Pulse: 84 80   Patient Position - Orthostatic VS: Sitting Lying       Physical Exam   Constitutional: She is oriented to person, place, and time  She appears well-developed and well-nourished  She appears distressed (uncomfortable)     HENT:   Head: Normocephalic and atraumatic  Mouth/Throat: Oropharynx is clear and moist    Eyes: EOM are normal  Pupils are equal, round, and reactive to light  Neck: Normal range of motion  Neck supple  Cardiovascular: Normal rate and regular rhythm  No murmur heard  Pulmonary/Chest: Effort normal and breath sounds normal  No respiratory distress  Abdominal: Soft  Bowel sounds are normal  There is tenderness ( +Park's sign, severe RUQ tenderness )  Musculoskeletal: Normal range of motion  Neurological: She is alert and oriented to person, place, and time  Skin: Skin is warm  No rash noted  No pallor  Psychiatric: She has a normal mood and affect  Her behavior is normal    Nursing note and vitals reviewed        ED Medications  Medications   fentanyl citrate (PF) 100 MCG/2ML 50 mcg (not administered)   lactated ringers bolus 1,000 mL (not administered)   sodium chloride 0 9 % infusion (not administered)   ondansetron (ZOFRAN) injection 4 mg (not administered)   piperacillin-tazobactam (ZOSYN) 3 375 g in sodium chloride 0 9 % 50 mL IVPB (not administered)     And   metroNIDAZOLE (FLAGYL) IVPB (premix) 500 mg (not administered)   heparin (porcine) subcutaneous injection 5,000 Units (not administered)   HYDROmorphone (DILAUDID) injection 1 mg (not administered)   sodium chloride 0 9 % bolus 1,000 mL (0 mL Intravenous Stopped 5/17/18 1351)   ondansetron (ZOFRAN) injection 4 mg (4 mg Intravenous Given 5/17/18 1251)   morphine (PF) 4 mg/mL injection 4 mg (4 mg Intravenous Given 5/17/18 1251)   ketorolac (TORADOL) injection 30 mg (30 mg Intravenous Given 5/17/18 1407)   fentanyl citrate (PF) 100 MCG/2ML 50 mcg (50 mcg Intravenous Given 5/17/18 1407)       Diagnostic Studies  Results Reviewed     Procedure Component Value Units Date/Time    Platelet count [[id number]]     Lab Status:  No result Specimen:  Blood     POCT urinalysis dipstick [[id number]]  (Normal) Resulted:  05/17/18 1345    Lab Status:  Final result Specimen:  Urine Updated:  05/17/18 1401     Color, UA YELLOW     Clarity, UA CLEAR     EXT Glucose, UA (Ref: Negative) NEG     EXT Bilirubin, UA (Ref: Negative) NEG     EXT Ketones, UA (Ref: Negative) 160     EXT Spec Grav, UA 1 015     EXT Blood, UA (Ref: Negative) TRACE     EXT pH, UA 6 5     EXT Protein, UA (Ref: Negative) NEG     EXT Urobilinogen, UA (Ref: 0 2- 1 0) 0 2     EXT Leukocytes, UA (Ref: Negative) NEG     EXT Nitrite, UA (Ref: Negative) NEG    POCT pregnancy, urine [[id number]]  (Normal) Resulted:  05/17/18 1400    Lab Status:  Final result Updated:  05/17/18 1400     EXT PREG TEST UR (Ref: Negative) NEG    Comprehensive metabolic panel [[id number]]  (Abnormal) Collected:  05/17/18 1254    Lab Status:  Final result Specimen:  Blood from Arm, Right Updated:  05/17/18 1347     Sodium 135 (L) mmol/L      Potassium 3 5 mmol/L      Chloride 98 (L) mmol/L      CO2 26 mmol/L      Anion Gap 11 mmol/L      BUN 8 mg/dL      Creatinine 0 57 (L) mg/dL      Glucose 110 mg/dL      Calcium 8 9 mg/dL      AST 21 U/L      ALT 20 U/L      Alkaline Phosphatase 52 U/L      Total Protein 7 9 g/dL      Albumin 3 8 g/dL      Total Bilirubin 0 50 mg/dL      eGFR 130 ml/min/1 73sq m     Narrative:         National Kidney Disease Education Program recommendations are as follows:  GFR calculation is accurate only with a steady state creatinine  Chronic Kidney disease less than 60 ml/min/1 73 sq  meters  Kidney failure less than 15 ml/min/1 73 sq  meters      Lipase [[id number]]  (Normal) Collected:  05/17/18 1254    Lab Status:  Final result Specimen:  Blood from Arm, Right Updated:  05/17/18 1347     Lipase 113 u/L     CBC and differential [[id number]]  (Abnormal) Collected:  05/17/18 1254    Lab Status:  Final result Specimen:  Blood from Arm, Right Updated:  05/17/18 1316     WBC 16 74 (H) Thousand/uL      RBC 4 28 Million/uL      Hemoglobin 13 7 g/dL      Hematocrit 38 4 %      MCV 90 fL      MCH 32 0 pg      MCHC 35 7 g/dL RDW 12 0 %      MPV 10 7 fL      Platelets 313 Thousands/uL      Neutrophils Relative 89 (H) %      Lymphocytes Relative 4 (L) %      Monocytes Relative 7 %      Eosinophils Relative 0 %      Basophils Relative 0 %      Neutrophils Absolute 14 96 (H) Thousands/µL      Lymphocytes Absolute 0 67 Thousands/µL      Monocytes Absolute 1 10 Thousand/µL      Eosinophils Absolute 0 00 Thousand/µL      Basophils Absolute 0 01 Thousands/µL                  US right upper quadrant   Final Result by Harshad Hagen MD (05/17 4439)      Two shadowing mobile: calculi and sludge  No pericholecystic fluid     Thickening of the gallbladder wall measuring 4 mm  Cannot assess Bryanna Carpen sign due to pain medications  Findings consistent with acute cholecystitis in the appropriate clinical setting  I personally reviewed this study with Dash Crum on 5/17/2018 at 4:37 PM                 Workstation performed: [id number]                    Procedures  Procedures       Phone Contacts  ED Phone Contact    ED Course  ED Course as of May 17 1702   Thu May 17, 2018   1225 Pt seen and examined  Patient reports intermittent RUQ pain since last weds  Was seen here sat and told that she has "sludge in my gallbaldder " Patient reports pain subsided last night after eating a bland diet  Ate sausage sandwich last night around 1900  Had increased pain in RUQ since last night  Seen by PCP today and sent here for CT  Discussed that she has + [place] and do not feel she needs radiation  Plan to check labs, give IVF, morphine and zofran and d/w on call surgeon  1318 WBC 16 74     1343 Pt still with pain - will give IV toradol and fentanyl  CMP and lipase WNL  Will d/w on call surgeon Dr Marc Howard  200 Pt feels much better  Dr Marc Howard ordered an [address] [place] [place]  Pt made aware  1512 Pt resting comfortably, aware we called US and she will be going over soon      1556 Dr Marc Howard down to see pt who is currently at [address] [place] [address] Two shadowing mobile: calculi and sludge  No pericholecystic fluid     Thickening of the gallbladder wall measuring 4 mm  Cannot assess Bryanna Carpen sign due to pain medications  Findings consistent with acute cholecystitis in the appropriate clinical setting  Dr Marc carrillo  Continuing NPO, will give NS @125/hr and another dose of fentanyl  MDM  CritCare Time    Disposition  Final diagnoses:   Acute cholecystitis   Leukocytosis   Colicky RUQ abdominal pain     Time reflects when diagnosis was documented in both MDM as applicable and the Disposition within this note     Time User Action Codes Description Comment    5/17/2018  4:47 PM Panfilo Sanchez [K81 0] Acute cholecystitis     5/17/2018  4:47 PM Panfilo Cheatham Add [D72 829] Leukocytosis     5/17/2018  4:47 PM Panfilo Cheatham Add [A53 01] Colicky RUQ abdominal pain       ED Disposition     ED Disposition Condition Comment    Admit  Admitting Physician: Clinton Gu   Level of Care: Med Surg [16]        Follow-up Information    None       Patient's Medications   Discharge Prescriptions    No medications on file     No discharge procedures on file      ED Provider  Electronically Signed by           Elma Cano DO  05/17/18 6964

## 2018-05-17 NOTE — PROGRESS NOTES
Assessment/Plan:      1  RUQ pain  Referred to the er for evaluation  Er at [facility] was contacted  rto prn         Subjective:      Patient ID: Raffaele Rivera is a 24 y o  female  Here today with mom with complaint of RUQ pain  started last week awakening in the middlle of the night with sharp pain in the RUQ  Pain was worse at night  Started with vomiting and so went to the er on 5/13  Had U/S done which showed sludge only and all labs and remained of [place] was normal  Was instructed to do bland diet and maalox  Did follow this until yesterday ate a sausage sandwich and then pain started again  Is in the RUQ and refers to the back  Pain is a 10   Cannot keep the maalox down  Has not reatined anything since yesterday am  Is not drinking  No fevers            OBJECTIVE  Past Medical History:   Diagnosis Date    Allergic     Anxiety     Disease of thyroid gland     GERD (gastroesophageal reflux disease)      @[facility]    Wt Readings from Last 3 Encounters:   05/17/18 85 3 kg (188 lb)   05/12/18 86 2 kg (190 lb)   09/30/16 98 kg (216 lb 2 oz)     BP Readings from Last 3 Encounters:   05/17/18 124/67   05/12/18 98/63   09/30/16 122/62     Pulse Readings from Last 3 Encounters:   05/17/18 72   05/12/18 74   09/30/16 100     BMI Readings from Last 3 Encounters:   05/17/18 29 44 kg/m²   05/12/18 31 62 kg/m²   09/30/16 34 36 kg/m²        Physical Exam   Constitutional: She appears well-developed  Neck: Normal range of motion  Neck supple  Cardiovascular: Normal rate and regular rhythm  Pulmonary/Chest: Effort normal and breath sounds normal    Abdominal:   Decreased Bowel sounds  Pain only in the RUQ with guarding

## 2018-05-17 NOTE — Clinical Note
Case was discussed with Dr Micheal Fritz and the patient's admission status was agreed to be Admission Status: inpatient status to the service of Dr Stoney Mcneill

## 2018-05-17 NOTE — ED NOTES
Hand off received from JASSON Benitez RN, per report- US to call ED when ready for patient       eMlyssa Lisa RN  05/17/18 7607

## 2018-05-17 NOTE — ED NOTES
Patient sitting in bed in no apparent distress texting on the phone  OOB to bathroom to void        Radha Lezama RN  05/17/18 5 [facility], RN  05/17/18 9629

## 2018-05-18 ENCOUNTER — ANESTHESIA (INPATIENT)
Dept: PERIOP | Facility: HOSPITAL | Age: 23
DRG: 416 | End: 2018-05-18
Payer: COMMERCIAL

## 2018-05-18 VITALS
TEMPERATURE: 99.1 F | WEIGHT: 180 LBS | HEART RATE: 88 BPM | OXYGEN SATURATION: 96 % | RESPIRATION RATE: 20 BRPM | HEIGHT: 66 IN | SYSTOLIC BLOOD PRESSURE: 112 MMHG | DIASTOLIC BLOOD PRESSURE: 61 MMHG | BODY MASS INDEX: 28.93 KG/M2

## 2018-05-18 LAB
ALBUMIN SERPL BCP-MCNC: 3.1 G/DL (ref 3.5–5)
ALP SERPL-CCNC: 41 U/L (ref 46–116)
ALT SERPL W P-5'-P-CCNC: 17 U/L (ref 12–78)
ANION GAP SERPL CALCULATED.3IONS-SCNC: 8 MMOL/L (ref 4–13)
AST SERPL W P-5'-P-CCNC: 20 U/L (ref 5–45)
BILIRUB SERPL-MCNC: 0.6 MG/DL (ref 0.2–1)
BUN SERPL-MCNC: 9 MG/DL (ref 5–25)
CALCIUM SERPL-MCNC: 8.3 MG/DL (ref 8.3–10.1)
CHLORIDE SERPL-SCNC: 107 MMOL/L (ref 100–108)
CO2 SERPL-SCNC: 28 MMOL/L (ref 21–32)
CREAT SERPL-MCNC: 0.74 MG/DL (ref 0.6–1.3)
ERYTHROCYTE [DISTWIDTH] IN BLOOD BY AUTOMATED COUNT: 12.4 % (ref 11.6–15.1)
GFR SERPL CREATININE-BSD FRML MDRD: 114 ML/MIN/1.73SQ M
GLUCOSE SERPL-MCNC: 86 MG/DL (ref 65–140)
HCT VFR BLD AUTO: 36.5 % (ref 34.8–46.1)
HGB BLD-MCNC: 12 G/DL (ref 11.5–15.4)
MAGNESIUM SERPL-MCNC: 1.9 MG/DL (ref 1.6–2.6)
MCH RBC QN AUTO: 30.9 PG (ref 26.8–34.3)
MCHC RBC AUTO-ENTMCNC: 32.9 G/DL (ref 31.4–37.4)
MCV RBC AUTO: 94 FL (ref 82–98)
PHOSPHATE SERPL-MCNC: 2.9 MG/DL (ref 2.7–4.5)
PLATELET # BLD AUTO: 244 THOUSANDS/UL (ref 149–390)
PMV BLD AUTO: 10.9 FL (ref 8.9–12.7)
POTASSIUM SERPL-SCNC: 3.7 MMOL/L (ref 3.5–5.3)
PROT SERPL-MCNC: 6.9 G/DL (ref 6.4–8.2)
RBC # BLD AUTO: 3.88 MILLION/UL (ref 3.81–5.12)
SODIUM SERPL-SCNC: 143 MMOL/L (ref 136–145)
WBC # BLD AUTO: 11.02 THOUSAND/UL (ref 4.31–10.16)

## 2018-05-18 PROCEDURE — 83735 ASSAY OF MAGNESIUM: CPT | Performed by: SURGERY

## 2018-05-18 PROCEDURE — 88304 TISSUE EXAM BY PATHOLOGIST: CPT | Performed by: PATHOLOGY

## 2018-05-18 PROCEDURE — C9113 INJ PANTOPRAZOLE SODIUM, VIA: HCPCS | Performed by: PHYSICIAN ASSISTANT

## 2018-05-18 PROCEDURE — 84100 ASSAY OF PHOSPHORUS: CPT | Performed by: SURGERY

## 2018-05-18 PROCEDURE — 0FT40ZZ RESECTION OF GALLBLADDER, OPEN APPROACH: ICD-10-PCS | Performed by: SURGERY

## 2018-05-18 PROCEDURE — 47562 LAPAROSCOPIC CHOLECYSTECTOMY: CPT | Performed by: PHYSICIAN ASSISTANT

## 2018-05-18 PROCEDURE — 0FJ44ZZ INSPECTION OF GALLBLADDER, PERCUTANEOUS ENDOSCOPIC APPROACH: ICD-10-PCS | Performed by: SURGERY

## 2018-05-18 PROCEDURE — 85027 COMPLETE CBC AUTOMATED: CPT | Performed by: SURGERY

## 2018-05-18 PROCEDURE — 47562 LAPAROSCOPIC CHOLECYSTECTOMY: CPT | Performed by: SURGERY

## 2018-05-18 PROCEDURE — 80053 COMPREHEN METABOLIC PANEL: CPT | Performed by: SURGERY

## 2018-05-18 RX ORDER — FENTANYL CITRATE/PF 50 MCG/ML
25 SYRINGE (ML) INJECTION
Status: DISCONTINUED | OUTPATIENT
Start: 2018-05-18 | End: 2018-05-18 | Stop reason: HOSPADM

## 2018-05-18 RX ORDER — MAGNESIUM HYDROXIDE 1200 MG/15ML
LIQUID ORAL AS NEEDED
Status: DISCONTINUED | OUTPATIENT
Start: 2018-05-18 | End: 2018-05-18 | Stop reason: HOSPADM

## 2018-05-18 RX ORDER — FENTANYL CITRATE 50 UG/ML
INJECTION, SOLUTION INTRAMUSCULAR; INTRAVENOUS AS NEEDED
Status: DISCONTINUED | OUTPATIENT
Start: 2018-05-18 | End: 2018-05-18 | Stop reason: SURG

## 2018-05-18 RX ORDER — PANTOPRAZOLE SODIUM 40 MG/1
40 INJECTION, POWDER, FOR SOLUTION INTRAVENOUS
Status: DISCONTINUED | OUTPATIENT
Start: 2018-05-18 | End: 2018-05-18

## 2018-05-18 RX ORDER — GLYCOPYRROLATE 0.2 MG/ML
INJECTION INTRAMUSCULAR; INTRAVENOUS AS NEEDED
Status: DISCONTINUED | OUTPATIENT
Start: 2018-05-18 | End: 2018-05-18 | Stop reason: SURG

## 2018-05-18 RX ORDER — MIDAZOLAM HYDROCHLORIDE 1 MG/ML
INJECTION INTRAMUSCULAR; INTRAVENOUS AS NEEDED
Status: DISCONTINUED | OUTPATIENT
Start: 2018-05-18 | End: 2018-05-18 | Stop reason: SURG

## 2018-05-18 RX ORDER — HYDROCODONE BITARTRATE AND ACETAMINOPHEN 5; 325 MG/1; MG/1
1 TABLET ORAL EVERY 4 HOURS PRN
Status: DISCONTINUED | OUTPATIENT
Start: 2018-05-18 | End: 2018-05-18 | Stop reason: HOSPADM

## 2018-05-18 RX ORDER — KETOROLAC TROMETHAMINE 30 MG/ML
INJECTION, SOLUTION INTRAMUSCULAR; INTRAVENOUS AS NEEDED
Status: DISCONTINUED | OUTPATIENT
Start: 2018-05-18 | End: 2018-05-18 | Stop reason: SURG

## 2018-05-18 RX ORDER — HYDROCODONE BITARTRATE AND ACETAMINOPHEN 5; 325 MG/1; MG/1
1 TABLET ORAL EVERY 4 HOURS PRN
Qty: 20 TABLET | Refills: 0 | Status: SHIPPED | OUTPATIENT
Start: 2018-05-18 | End: 2018-05-28

## 2018-05-18 RX ORDER — ROCURONIUM BROMIDE 10 MG/ML
INJECTION, SOLUTION INTRAVENOUS AS NEEDED
Status: DISCONTINUED | OUTPATIENT
Start: 2018-05-18 | End: 2018-05-18 | Stop reason: SURG

## 2018-05-18 RX ORDER — METOCLOPRAMIDE HYDROCHLORIDE 5 MG/ML
10 INJECTION INTRAMUSCULAR; INTRAVENOUS ONCE AS NEEDED
Status: DISCONTINUED | OUTPATIENT
Start: 2018-05-18 | End: 2018-05-18 | Stop reason: HOSPADM

## 2018-05-18 RX ORDER — PROPOFOL 10 MG/ML
INJECTION, EMULSION INTRAVENOUS AS NEEDED
Status: DISCONTINUED | OUTPATIENT
Start: 2018-05-18 | End: 2018-05-18 | Stop reason: SURG

## 2018-05-18 RX ORDER — ONDANSETRON 2 MG/ML
4 INJECTION INTRAMUSCULAR; INTRAVENOUS ONCE AS NEEDED
Status: COMPLETED | OUTPATIENT
Start: 2018-05-18 | End: 2018-05-18

## 2018-05-18 RX ADMIN — MIDAZOLAM HYDROCHLORIDE 2 MG: 1 INJECTION, SOLUTION INTRAMUSCULAR; INTRAVENOUS at 11:35

## 2018-05-18 RX ADMIN — FENTANYL CITRATE 25 MCG: 50 INJECTION, SOLUTION INTRAMUSCULAR; INTRAVENOUS at 13:47

## 2018-05-18 RX ADMIN — ONDANSETRON HYDROCHLORIDE 4 MG: 2 INJECTION, SOLUTION INTRAMUSCULAR; INTRAVENOUS at 03:20

## 2018-05-18 RX ADMIN — FENTANYL CITRATE 100 MCG: 50 INJECTION, SOLUTION INTRAMUSCULAR; INTRAVENOUS at 12:22

## 2018-05-18 RX ADMIN — PIPERACILLIN SODIUM AND TAZOBACTAM SODIUM 3.38 G: 3; .375 INJECTION, POWDER, LYOPHILIZED, FOR SOLUTION INTRAVENOUS at 11:07

## 2018-05-18 RX ADMIN — NEOSTIGMINE METHYLSULFATE 3 MG: 1 INJECTION, SOLUTION INTRAMUSCULAR; INTRAVENOUS; SUBCUTANEOUS at 13:15

## 2018-05-18 RX ADMIN — PROPOFOL 200 MG: 10 INJECTION, EMULSION INTRAVENOUS at 12:11

## 2018-05-18 RX ADMIN — HYDROMORPHONE HYDROCHLORIDE 1 MG: 1 INJECTION, SOLUTION INTRAMUSCULAR; INTRAVENOUS; SUBCUTANEOUS at 03:44

## 2018-05-18 RX ADMIN — METRONIDAZOLE 500 MG: 500 INJECTION, SOLUTION INTRAVENOUS at 00:41

## 2018-05-18 RX ADMIN — SODIUM CHLORIDE 125 ML/HR: 0.9 INJECTION, SOLUTION INTRAVENOUS at 03:19

## 2018-05-18 RX ADMIN — GLYCOPYRROLATE 0.6 MG: 0.2 INJECTION, SOLUTION INTRAMUSCULAR; INTRAVENOUS at 13:15

## 2018-05-18 RX ADMIN — FENTANYL CITRATE 50 MCG: 50 INJECTION, SOLUTION INTRAMUSCULAR; INTRAVENOUS at 12:44

## 2018-05-18 RX ADMIN — PIPERACILLIN SODIUM AND TAZOBACTAM SODIUM 3.38 G: 3; .375 INJECTION, POWDER, LYOPHILIZED, FOR SOLUTION INTRAVENOUS at 12:18

## 2018-05-18 RX ADMIN — HYDROMORPHONE HYDROCHLORIDE 1 MG: 1 INJECTION, SOLUTION INTRAMUSCULAR; INTRAVENOUS; SUBCUTANEOUS at 09:40

## 2018-05-18 RX ADMIN — FENTANYL CITRATE 50 MCG: 50 INJECTION, SOLUTION INTRAMUSCULAR; INTRAVENOUS at 12:41

## 2018-05-18 RX ADMIN — ROCURONIUM BROMIDE 40 MG: 10 INJECTION INTRAVENOUS at 12:11

## 2018-05-18 RX ADMIN — METRONIDAZOLE 500 MG: 500 INJECTION, SOLUTION INTRAVENOUS at 08:21

## 2018-05-18 RX ADMIN — HYDROMORPHONE HYDROCHLORIDE 0.5 MG: 1 INJECTION, SOLUTION INTRAMUSCULAR; INTRAVENOUS; SUBCUTANEOUS at 14:19

## 2018-05-18 RX ADMIN — HYDROCODONE BITARTRATE AND ACETAMINOPHEN 1 TABLET: 5; 325 TABLET ORAL at 14:29

## 2018-05-18 RX ADMIN — FENTANYL CITRATE 25 MCG: 50 INJECTION, SOLUTION INTRAMUSCULAR; INTRAVENOUS at 13:41

## 2018-05-18 RX ADMIN — PIPERACILLIN SODIUM AND TAZOBACTAM SODIUM 3.38 G: 3; .375 INJECTION, POWDER, LYOPHILIZED, FOR SOLUTION INTRAVENOUS at 04:16

## 2018-05-18 RX ADMIN — ONDANSETRON HYDROCHLORIDE 4 MG: 2 INJECTION, SOLUTION INTRAMUSCULAR; INTRAVENOUS at 14:43

## 2018-05-18 RX ADMIN — HYDROMORPHONE HYDROCHLORIDE 0.5 MG: 1 INJECTION, SOLUTION INTRAMUSCULAR; INTRAVENOUS; SUBCUTANEOUS at 17:11

## 2018-05-18 RX ADMIN — FENTANYL CITRATE 100 MCG: 50 INJECTION, SOLUTION INTRAMUSCULAR; INTRAVENOUS at 12:10

## 2018-05-18 RX ADMIN — HYDROMORPHONE HYDROCHLORIDE 1 MG: 1 INJECTION, SOLUTION INTRAMUSCULAR; INTRAVENOUS; SUBCUTANEOUS at 00:44

## 2018-05-18 RX ADMIN — SODIUM CHLORIDE 125 ML/HR: 0.9 INJECTION, SOLUTION INTRAVENOUS at 15:39

## 2018-05-18 RX ADMIN — ONDANSETRON HYDROCHLORIDE 4 MG: 2 INJECTION, SOLUTION INTRAMUSCULAR; INTRAVENOUS at 09:35

## 2018-05-18 RX ADMIN — SODIUM CHLORIDE: 0.9 INJECTION, SOLUTION INTRAVENOUS at 12:03

## 2018-05-18 RX ADMIN — HYDROMORPHONE HYDROCHLORIDE 0.5 MG: 1 INJECTION, SOLUTION INTRAMUSCULAR; INTRAVENOUS; SUBCUTANEOUS at 14:05

## 2018-05-18 RX ADMIN — PANTOPRAZOLE SODIUM 40 MG: 40 INJECTION, POWDER, FOR SOLUTION INTRAVENOUS at 09:38

## 2018-05-18 RX ADMIN — HYDROMORPHONE HYDROCHLORIDE 0.5 MG: 1 INJECTION, SOLUTION INTRAMUSCULAR; INTRAVENOUS; SUBCUTANEOUS at 13:54

## 2018-05-18 RX ADMIN — KETOROLAC TROMETHAMINE 15 MG: 30 INJECTION, SOLUTION INTRAMUSCULAR at 13:14

## 2018-05-18 NOTE — H&P
Assessment/Plan     Principal Problem:    Acute cholecystitis  NPO, IVF, IV ABX, LAP YUE 5/18    Subjective     Maricruz Rogel is an 24 y o  female presents with abdominal pain for one day, was recently in ER and sent home  Pain is sharp crampy 8/10 constant, +nausea and vomiting, no changes in bowel habits  Past Surgical History:   Procedure Laterality Date    TONSILLECTOMY         Review of Systems   Constitutional: Negative  HENT: Negative  Eyes: Negative  Respiratory: Negative  Cardiovascular: Negative  Gastrointestinal: Positive for abdominal distention, abdominal pain, nausea and vomiting  Endocrine: Negative  Genitourinary: Negative  Musculoskeletal: Negative  Skin: Negative  Allergic/Immunologic: Negative  Neurological: Negative  Hematological: Negative  Psychiatric/Behavioral: Negative  All other systems reviewed and are negative  Objective     /66 (BP Location: Left arm)   Pulse 101   Temp 99 6 °F (37 6 °C) (Oral)   Resp 20   Ht 5' 6" (1 676 m)   Wt 81 6 kg (180 lb)   SpO2 97%   BMI 29 05 kg/m²     Physical Exam   Constitutional: She is oriented to person, place, and time  She appears well-developed and well-nourished  No distress  HENT:   Head: Normocephalic and atraumatic  Right Ear: External ear normal    Left Ear: External ear normal    Nose: Nose normal    Mouth/Throat: Oropharynx is clear and moist  No oropharyngeal exudate  Eyes: Conjunctivae and EOM are normal  Pupils are equal, round, and reactive to light  Right eye exhibits no discharge  Left eye exhibits no discharge  No scleral icterus  Neck: Normal range of motion  Neck supple  No JVD present  No tracheal deviation present  No thyromegaly present  Cardiovascular: Normal rate, normal heart sounds and intact distal pulses  Exam reveals no gallop and no friction rub  No murmur heard  Pulmonary/Chest: Effort normal  No stridor  No respiratory distress   She has no wheezes  She has no rales  She exhibits no tenderness  Abdominal: Soft  Bowel sounds are normal  She exhibits distension  She exhibits no mass  There is tenderness  There is no rebound and no guarding  TTP in RUQ   Musculoskeletal: Normal range of motion  She exhibits no edema, tenderness or deformity  Lymphadenopathy:     She has no cervical adenopathy  Neurological: She is alert and oriented to person, place, and time  No cranial nerve deficit  Coordination normal    Skin: Skin is warm and dry  No rash noted  She is not diaphoretic  No erythema  No pallor  Psychiatric: She has a normal mood and affect  Her behavior is normal  Judgment and thought content normal    Nursing note and vitals reviewed  Rosy Adair

## 2018-05-18 NOTE — PROGRESS NOTES
Progress Note - General Surgery   Sadie Page 24 y o  female MRN: [id number]  Unit/Bed#: [address] [place] [address] Encounter: [id number]    Assessment/Plan:  Right upper quadrant abdominal pain with Right upper quadrant ultrasound with cholelithiasis and gallbladder wall thickening consistent with acute cholecystitis  -plan for OR today for laparoscopic cholecystectomy  Procedure discussed with patient in detail as well as possible risks and complications by Dr Hilario Peterson and patient has given her consent and written form  All questions answered   -continue NPO and IV fluids  -continue IV antibiotics  -pain control and antiemetics as needed  Leukocytosis present on admission likely related to acute cholecystitis  -monitor, on antibiotics  GERD- add Protonix  DVT prophylaxis- on Heparin, hold for OR today    Plan for discharge later today versus tomorrow based on findings intraoperatively and patient recovery  Subjective/Objective   Chief Complaint:  Abdominal pain    Subjective:  Patient continues with abdominal pain  No vomiting  Denies fevers or chills  For OR today  Objective:     Blood pressure 115/66, pulse 101, temperature 99 6 °F (37 6 °C), temperature source Oral, resp  rate 20, height 5' 6" (1 676 m), weight 81 6 kg (180 lb), SpO2 97 %  ,Body mass index is 29 05 kg/m²        Intake/Output Summary (Last 24 hours) at 05/18/18 2021  Last data filed at 05/18/18 0319   Gross per 24 hour   Intake             2150 ml   Output                0 ml   Net             2150 ml       Invasive Devices     Peripheral Intravenous Line            Peripheral IV 05/17/18 Right Antecubital 1 day                Physical Exam: General appearance: alert and oriented, in no acute distress  Head: Sclera anicteric, mucous membranes moist  Neck: no JVD, supple, symmetrical, trachea midline and No thyromegaly  Lungs: clear to auscultation bilaterally  Heart: regular rate and rhythm and S1, S2 normal  Abdomen: Soft, nondistended, tenderness over right upper quadrant without rebound or guarding  Mild all to Lindle Edelson sign, active bowel sounds  Extremities: no edema, redness or tenderness in the calves or thighs  Skin: No jaundice, no rashes or lesions    Lab, Imaging and other studies:  I have personally reviewed pertinent lab results    , CBC:   Lab Results   Component Value Date    WBC 11 02 (H) 05/18/2018    HGB 12 0 05/18/2018    HCT 36 5 05/18/2018    MCV 94 05/18/2018     05/18/2018    MCH 30 9 05/18/2018    MCHC 32 9 05/18/2018    RDW 12 4 05/18/2018    MPV 10 9 05/18/2018   , CMP:   Lab Results   Component Value Date     05/18/2018    K 3 7 05/18/2018     05/18/2018    CO2 28 05/18/2018    ANIONGAP 8 05/18/2018    BUN 9 05/18/2018    CREATININE 0 74 05/18/2018    GLUCOSE 86 05/18/2018    CALCIUM 8 3 05/18/2018    AST 20 05/18/2018    ALT 17 05/18/2018    ALKPHOS 41 (L) 05/18/2018    PROT 6 9 05/18/2018    BILITOT 0 60 05/18/2018    EGFR 114 05/18/2018     VTE Pharmacologic Prophylaxis: Heparin  VTE Mechanical Prophylaxis: sequential compression device     Christopher Monroe PA-C

## 2018-05-18 NOTE — ANESTHESIA PREPROCEDURE EVALUATION
Review of Systems/Medical History  Patient summary reviewed  Chart reviewed      Cardiovascular  Negative cardio ROS    Pulmonary  Negative pulmonary ROS        GI/Hepatic    GERD well controlled,        Negative  ROS        Endo/Other  History of thyroid disease , hypothyroidism,      GYN  Negative gynecology ROS          Hematology  Negative hematology ROS      Musculoskeletal  Negative musculoskeletal ROS        Neurology  Negative neurology ROS      Psychology   Anxiety,              Physical Exam    Airway    Mallampati score: II  TM Distance: >3 FB  Neck ROM: full     Dental   No notable dental hx     Cardiovascular  Comment: Negative ROS, Rhythm: regular, Rate: normal, Cardiovascular exam normal    Pulmonary  Pulmonary exam normal Breath sounds clear to auscultation,     Other Findings        Anesthesia Plan  ASA Score- 2     Anesthesia Type- general with ASA Monitors  Additional Monitors:   Airway Plan: ETT  Plan Factors-    Induction- intravenous  Postoperative Plan- Plan for postoperative opioid use  Informed Consent- Anesthetic plan and risks discussed with patient and mother

## 2018-05-18 NOTE — SOCIAL WORK
Met with Pt with Pt's mother at bedside  Pt's mother informed  that Pt will be discharged tonight to go home  Pt lives at home with family  Independent prior to admission  No needs anticipated

## 2018-05-18 NOTE — OP NOTE
CHOLECYSTECTOMY LAPAROSCOPIC  Postoperative Note  PATIENT NAME: Marlen Galvin  : 1995  MRN: [id number]  [facility] OR ROOM 03    Surgery Date: 2018    Pre-op Dx:  Acute cholecystitis [K81 0]    Post-Op Diagnosis Codes:     * Acute cholecystitis [K81 0]    Procedure(s):  CHOLECYSTECTOMY LAPAROSCOPIC    Surgeon(s) and Role:     * Dmitriy Beard MD - Primary     * Christinemalorie Toro PA-C - Assisting    The Physician Assistant was medically necessary for surgical safety the case including suturing, retraction, and hemostasis  Specimens:  ID Type Source Tests Collected by Time Destination   1 :  Tissue Gallbladder TISSUE EXAM Dmitriy Beard MD 2018 1246        Estimated Blood Loss:   Minimal    Anesthesia Type:   General     Procedure: The patient was seen again in the Holding Room  The risks, benefits, complications, treatment options, and expected outcomes were discussed with the patient  The possibilities of reaction to medication, pulmonary aspiration, perforation of viscus, bleeding, recurrent infection, finding a normal gallbladder, the need for additional procedures, failure to diagnose a condition, the possible need to convert to an open procedure, and creating a complication requiring transfusion or operation were discussed with the patient  The patient and/or family concurred with the proposed plan, giving informed consent  The site of surgery properly noted/marked  The patient was taken to Operating Room, identified as Marlen Galvin  and the procedure verified as Laparoscopic Cholecystectomy with possible Intraoperative Cholangiogram  A Time Out was held after prepping and draping in sterile fashion  The above information was confirmed  Prior to the induction of general anesthesia, antibiotic prophylaxis was administered  General endotracheal anesthesia was then administered and tolerated well  After the induction, the abdomen was prepped in the usual sterile fashion   The patient was positioned in the supine position, along with some reverse Trendelenburg  Local anesthetic agent was injected into the skin near the umbilicus and an incision made  The midline fascia was incised and the open technique was used to introduce a  port under direct vision  Pneumoperitoneum was then created with CO2 and tolerated well without any adverse changes in the patient's vital signs  Additional trocars were introduced under direct vision  All skin incisions were infiltrated with a local anesthetic agent before making the incision and placing the trocars  The patient was placed in the head up, left side down position  The gallbladder was identified, the fundus grasped and retracted cephalad and laterally  Adhesions were lysed bluntly and with the electrocautery or harmonic scapel  where indicated, taking care not to injure any adjacent organs or viscus  The infundibulum was grasped and retracted laterally, exposing the peritoneum overlying the triangle of Calot  This was then dissected anteriorily and posteriorly from the gallbladder,  and exposed in a blunt fashion or using cautery or harmonic scapel where appropriate  The cystic duct was clearly identified and  dissected circumferentially, as was the cystic artery, as the only two tubular structures leading into the gallbladder   The critical view of the [address] was identified and opened  The posterior aspect of the gallbladder was lifted off the cystic plate, to insure that there were no posterior structres behind the gallbladder or leading into the liver  Once this was all clearly identified, the cystic duct was then doubly ligated with surgical clips and/or Endoloop suture on the patient side and singly clipped on the gallbladder side and divided  The cystic artery was re-identified,  ligated with clips and divided as well   If necessary, the cystic duct was "miked" back of any stones felt in the cystic duct, prior to clipping the patient side of the duct  The gallbladder was dissected from the liver bed in retrograde fashion with the electrocautery and/or harmonic scalpel where appropriate  The gallbladder was removed, via an endopouch, through the umbilical port  The liver bed was irrigated and inspected  Hemostasis was achieved with the electrocautery  Copious irrigation was utilized and was repeatedly aspirated until clear  Pneumoperitoneum was completely reduced after viewing removal of the trocars under direct vision  The wound was thoroughly irrigated and any fascia defect was then closed with a figure of eight suture 0-vicryl; the skin was then closed with 4-0 monocryl and a sterile dressings were applied  Instrument, sponge, and needle counts were correct at closure and at the conclusion of the case  This text is generated with voice recognition software  There may be translation, syntax,  or grammatical errors  If you have any questions, please contact the dictating provider       Complications: None    Condition: Stable to PACU    SIGNATURE: Carmen Waters MD   DATE: May 18, 2018   TIME: 1:21 PM

## 2018-05-18 NOTE — CASE MANAGEMENT
Initial Clinical Review    Admission: Date/Time/Statement: 5/17/18 @ 1651     Orders Placed This Encounter   Procedures    Inpatient Admission     Standing Status:   Standing     Number of Occurrences:   1     Order Specific Question:   Admitting Physician     Answer:   Isela Newby     Order Specific Question:   Level of Care     Answer:   Med Surg [16]     Order Specific Question:   Estimated length of stay     Answer:   More than 2 Midnights     Order Specific Question:   Certification     Answer:   I certify that inpatient services are medically necessary for this patient for a duration of greater than two midnights  See H&P and MD Progress Notes for additional information about the patient's course of treatment  ED: Date/Time/Mode of Arrival:   ED Arrival Information     Expected Arrival Acuity Means of Arrival Escorted By Service Admission Type    - 5/17/2018 12:14 Urgent Walk-In Family Member Surgery-General Urgent    Arrival Complaint    abd pain       Chief Complaint:   Chief Complaint   Patient presents with    Abdominal Pain     Patient reports intermittent RUQ pain since last weds  Was seen here sat and told that she has "sludge in my gallbaldder " Patient reports pain subsided last night after eating a bland diet  Ate sausage sandwich last night around 1900  Had increased pain in RUQ since last night  Seen by PCP today and sent here for CT   History of Illness:   Patient reports intermittent RUQ pain since last weds  Was seen here sat and told that she has "sludge in my gallbaldder " Patient reports pain subsided last night after eating a bland diet  Had increased pain in RUQ since last night ~ 2300 about 4 hours after eating sausage sandwhich  Seen by PCP today and sent here for CT  Discussed that she has + [place] and do not feel she needs radiation    ED Vital Signs:   ED Triage Vitals [05/17/18 1226]   Temperature Pulse Respirations Blood Pressure SpO2   98 8 °F (37 1 °C) 84 18 127/71 100 %      Temp Source Heart Rate Source Patient Position - Orthostatic VS BP Location FiO2 (%)   Temporal Monitor Sitting Right arm --      Pain Score       Worst Possible Pain        Wt Readings from Last 1 Encounters:   05/18/18 81 6 kg (180 lb)   Vital Signs (abnormal):   T 99 6  Abnormal Labs/Diagnostic Test Results:   WBC 16 74  CL 98 CR 0 57   US RUQ=Two shadowing mobile: calculi and sludge  No pericholecystic fluid     Thickening of the gallbladder wall measuring 4 mm  Cannot assess Jeneane Lucie sign due to pain medications  Findings consistent with acute cholecystitis in the appropriate clinical setting    ED Treatment:   Medication Administration from 05/17/2018 1214 to 05/17/2018 1740       Date/Time Order Dose Route Action Action by Comments     05/17/2018 1351 sodium chloride 0 9 % bolus 1,000 mL 0 mL Intravenous Stopped Sherry Townesnd RN      05/17/2018 1251 sodium chloride 0 9 % bolus 1,000 mL 1,000 mL Intravenous New Bag Electa Morales Benitez RN      05/17/2018 1251 ondansetron (ZOFRAN) injection 4 mg 4 mg Intravenous Given Abeba Cabrera RN      05/17/2018 1251 morphine (PF) 4 mg/mL injection 4 mg 4 mg Intravenous Given Abeba Cabrera RN      05/17/2018 1407 ketorolac (TORADOL) injection 30 mg 30 mg Intravenous Given Abeba Cabrera RN      05/17/2018 1407 fentanyl citrate (PF) 100 MCG/2ML 50 mcg 50 mcg Intravenous Given Abeba Cabrera RN      05/17/2018 1707 fentanyl citrate (PF) 100 MCG/2ML 50 mcg 50 mcg Intravenous Given Sherry Townsend RN      05/17/2018 1652 sodium chloride 0 9 % infusion 125 mL/hr Intravenous Wend[place] 37 Sherry Townsend RN      05/17/2018 1715 lactated ringers bolus 1,000 mL 1,000 mL Intravenous [place] 37 Sherry Townsend RN      05/17/2018 1717 piperacillin-tazobactam (ZOSYN) 3 375 g in sodium chloride 0 9 % 50 mL IVPB 3 375 g Intravenous [place] 37 Sherry Townsend RN      05/17/2018 1727 heparin (porcine) subcutaneous injection 5,000 Units 5,000 Units Subcutaneous Given Sherry Townsend, RN left      Past Medical/Surgical History: Active Ambulatory Problems     Diagnosis Date Noted    No Active Ambulatory Problems     Resolved Ambulatory Problems     Diagnosis Date Noted    No Resolved Ambulatory Problems     Past Medical History:   Diagnosis Date    Allergic     Anxiety     Disease of thyroid gland     GERD (gastroesophageal reflux disease)    Admitting Diagnosis: Acute cholecystitis [K81 0]  Leukocytosis [D72 829]  Abdominal pain [N95 4]  Colicky RUQ abdominal pain [R10 11]  Age/Sex: 24 y o  female  Assessment/Plan:   Abdominal: Soft  Bowel sounds are normal  She exhibits distension  She exhibits no mass  There is tenderness  There is no rebound and no guarding  TTP in RUQ   Acute cholecystitis  NPO, IVF, IV ABX,   Admission Orders:  MED SURG  NPO  VENODYNES  Scheduled Meds:   Current Facility-Administered Medications:  heparin (porcine) 5,000 Units Subcutaneous [facility] Shy Bass MD    HYDROmorphone 1 mg Intravenous Q3H PRN Shy Bass MD    piperacillin-tazobactam 3 375 g Intravenous Q6H Shy Bass MD Last Rate: 3 375 g (05/18/18 0416)   And        metroNIDAZOLE 500 mg Intravenous Q8H Shy Bass MD Last Rate: 500 mg (05/18/18 0821)   ondansetron 4 mg Intravenous Q6H PRN Shy Bass MD    pantoprazole 40 mg Intravenous Q24H [facility] Lisset Monroe PA-C    sodium chloride 125 mL/hr Intravenous Continuous Shy Bass MD Last Rate: 125 mL/hr (05/18/18 0319)     Continuous Infusions:   sodium chloride 125 mL/hr Last Rate: 125 mL/hr (05/18/18 0319)     PRN Meds: HYDROmorphone;USED X1    Ondansetron;USED X1  Thank you,  [facility] for 2017  Network Utilization Review Department  Phone: [phone]; Fax [phone]  ATTENTION: The Network Utilization Review Department is now centralized for our 7 Facilities   Make a note that we have a new phone and fax numbers for our Department  Please call with any questions or concerns to [phone] and carefully follow the prompts so that you are directed to the right person  All voicemails are confidential  Fax any determinations, approvals, denials, and requests for initial or continue stay review clinical to [phone]  Due to HIGH CALL volume, it would be easier if you could please send faxed requests to expedite your requests and in part, help us provide discharge notifications faster

## 2018-05-18 NOTE — DISCHARGE INSTRUCTIONS
Kobi Bolanos Instructions      Dr Dmitriy Beard  M D     1  General: Vandana Martinez will feel pulling sensations around the wound or funny aches and pains around the incisions  This is normal  Even minor surgery is a change in your body and this is your bodys way of reaction to it  If you have had abdominal surgery, it may help to support the incision with a small pillow or blanket for comfort when moving or coughing  2  Wound care:    Bandage/Dressing - Make sure to remove the bandage in about 24 hours, unless instructed otherwise  You usually don't have to redress the wound after 24-48 hours, unless for comfort  Keep the incision clean and dry  Let air get to it  If the Steri-Strips fall off, just keep the wound clean  Glue - Leave glue alone, it will fall off on its own, no need for an additional dressings    3  Water: You may shower over the wound, unless there are drain tubes left in place  Do not bathe or use a pool or hot tub until cleared by the physician  You may shower right over the staples, glue or Steri-Strips and rinse wound with soapy water but do not scrub incision pat dry when you are done  4  Activity: You may go up and down stairs, walk as much as you are comfortable, but walk at least 3 times each day  If you have had abdominal surgery, do not lift anything heavier than 15 pounds for at least 2 weeks, unless cleared by the doctor  5  Diet: You may resume a regular diet  If you had a same-day surgery or overnight stay surgery, you may wish to eat lightly for a few days: soups, crackers, and sandwiches  You may resume a regular diet when ready  6  Medications: Resume all of your previous medications, unless told otherwise by the doctor  Avoid aspirin or ibuprofen (Advil, Motrin, etc ) products for 2-3 days after the date of surgery  You may, at that time, began to take them again   Tylenol is always fine, unless you are taking any narcotic pain medication containing Tylenol (such as Percocet, Darvocet, Vicodin, or anything containing acetaminophen)  Do not take Tylenol if you're taking these medications  You do not need to take the narcotic pain medications unless you are having significant pain and discomfort  7  Driving: He will need someone to drive you home on the day of surgery  Do not drive or make any important decisions while on narcotic pain medication or 24 hours and after anesthesia or sedation for surgery  Generally, you may drive when your off all narcotic pain medications  8  Upset Stomach: You may take Maalox, Tums, or similar items for an upset stomach  If your narcotic pain medication causes an upset stomach, do not take it on an empty stomach  Try taking it with at least some crackers or toast      9  Constipation: Patients often experienced constipation after surgery  You may take over-the-counter medication for this, such as Metamucil, Senokot, Dulcolax, milk of magnesia, etc  You may take a suppository unless you have had anorectal surgery such as a procedure on your hemorrhoids  If you experience significant nausea or vomiting after abdominal surgery, call the office before trying any of these medications  10  Call the office: If you are experiencing any of the following, fevers above 101 5°, significant nausea or vomiting, if the wound develops drainage and/or is excessive redness around the wound, or if you have significant diarrhea or other worsening symptoms  11  Pain: You may be given a prescription for pain  This will be given to the hospital, the day of surgery  12  Sexual Activity: You may resume sexual activity when you feel ready and comfortable and your incision is sealed and healed without apparent infection risk      [facility]  Phone: [phone]

## 2018-05-22 ENCOUNTER — TRANSITIONAL CARE MANAGEMENT (OUTPATIENT)
Dept: FAMILY MEDICINE CLINIC | Facility: CLINIC | Age: 23
End: 2018-05-22

## 2018-05-31 ENCOUNTER — OFFICE VISIT (OUTPATIENT)
Dept: SURGERY | Facility: HOSPITAL | Age: 23
End: 2018-05-31

## 2018-05-31 VITALS
DIASTOLIC BLOOD PRESSURE: 66 MMHG | SYSTOLIC BLOOD PRESSURE: 102 MMHG | HEIGHT: 66 IN | WEIGHT: 180 LBS | BODY MASS INDEX: 28.93 KG/M2

## 2018-05-31 DIAGNOSIS — Z09 POSTOP CHECK: Primary | ICD-10-CM

## 2018-05-31 PROCEDURE — 99024 POSTOP FOLLOW-UP VISIT: CPT | Performed by: SURGERY

## 2018-05-31 NOTE — LETTER
May 31, 2018     Raudel Pulliam MD  [address] [address]    Patient: Sil Porter   YOB: 1995   Date of Visit: 5/31/2018       Dear Dr Ware Notice Recipients: Thank you for referring Sil Porter to me for evaluation  Below are my notes for this consultation  If you have questions, please do not hesitate to call me  I look forward to following your patient along with you  Sincerely,        Chris Mccloud MD        CC: No Recipients  [facility], [address] [address]  5/31/2018  9:56 AM  Sign at close encounter  Assessment/Plan: Sil Porter is a 24year old female who presents today in post-operative state status post laparoscopic cholecystectomy done on 5/18/18  Physical exam revealed her incisions are healing well  Discussed pathology  No heavy lifting greater than 25 pounds until a full month after surgery  She may follow up as needed  She knows to call if any questions or concerns arise  No problem-specific Assessment & Plan notes found for this encounter  There are no diagnoses linked to this encounter  Subjective:      Patient ID: Sil Porter is a 24 y o  female  Sil Porter is a 24year old female who presents today in post-operative state status post laparoscopic cholecystectomy done on 5/18/18  She reports she is doing very well  She is eating well  No issues with bowels  The following portions of the patient's history were reviewed and updated as appropriate: allergies, current medications, past family history, past medical history, past social history, past surgical history and problem list     Review of Systems   Constitutional: Negative  HENT: Negative  Eyes: Negative  Respiratory: Negative  Cardiovascular: Negative  Gastrointestinal: Negative  Endocrine: Negative  Genitourinary: Negative  Musculoskeletal: Negative  Skin: Negative  Allergic/Immunologic: Negative  Neurological: Negative  Hematological: Negative  Psychiatric/Behavioral: Negative  All other systems reviewed and are negative  Objective:      /66   Ht 5' 6" (1 676 m)   Wt 81 6 kg (180 lb)   BMI 29 05 kg/m²           Physical Exam   Constitutional: She is oriented to person, place, and time  She appears well-developed and well-nourished  No distress  HENT:   Head: Normocephalic and atraumatic  Right Ear: External ear normal    Left Ear: External ear normal    Nose: Nose normal    Mouth/Throat: Oropharynx is clear and moist  No oropharyngeal exudate  Eyes: Conjunctivae and EOM are normal  Pupils are equal, round, and reactive to light  Right eye exhibits no discharge  Left eye exhibits no discharge  No scleral icterus  Neck: Normal range of motion  Neck supple  No JVD present  No tracheal deviation present  No thyromegaly present  Cardiovascular: Normal rate, normal heart sounds and intact distal pulses  Exam reveals no gallop and no friction rub  No murmur heard  Pulmonary/Chest: Effort normal  No stridor  No respiratory distress  She has no wheezes  She has no rales  She exhibits no tenderness  Abdominal: Soft  Bowel sounds are normal  She exhibits no distension and no mass  There is no tenderness  There is no rebound and no guarding  Incisions healing well  Musculoskeletal: Normal range of motion  She exhibits no edema, tenderness or deformity  Lymphadenopathy:     She has no cervical adenopathy  Neurological: She is alert and oriented to person, place, and time  No cranial nerve deficit  Coordination normal    Skin: Skin is warm and dry  No rash noted  She is not diaphoretic  No erythema  No pallor  Psychiatric: She has a normal mood and affect  Her behavior is normal  Judgment and thought content normal    Nursing note and vitals reviewed                By signing my name below, Sebas Vee, attest that this documentation has been prepared under the direction and in the presence of Susanne Dao MD  Electonically Signed: Katty Zhu 5/31/2018  Yazmin Villasenor, personally performed the services described in this documenation  All medical record entries made by the scribe were at my direction and in my presence  I have reviewed the chart and discharge instructions (if applicable) and agree that the record reflects my personal performance and is accurate and complete  Susanne Dao MD  5/31/2018

## 2018-05-31 NOTE — PROGRESS NOTES
Assessment/Plan: Missy Jennings is a 24year old female who presents today in post-operative state status post laparoscopic cholecystectomy done on 5/18/18  Physical exam revealed her incisions are healing well  Discussed pathology  No heavy lifting greater than 25 pounds until a full month after surgery  She may follow up as needed  She knows to call if any questions or concerns arise  No problem-specific Assessment & Plan notes found for this encounter  There are no diagnoses linked to this encounter  Subjective:      Patient ID: Missy Jennings is a 24 y o  female  Missy Jennings is a 24year old female who presents today in post-operative state status post laparoscopic cholecystectomy done on 5/18/18  She reports she is doing very well  She is eating well  No issues with bowels  The following portions of the patient's history were reviewed and updated as appropriate: allergies, current medications, past family history, past medical history, past social history, past surgical history and problem list     Review of Systems   Constitutional: Negative  HENT: Negative  Eyes: Negative  Respiratory: Negative  Cardiovascular: Negative  Gastrointestinal: Negative  Endocrine: Negative  Genitourinary: Negative  Musculoskeletal: Negative  Skin: Negative  Allergic/Immunologic: Negative  Neurological: Negative  Hematological: Negative  Psychiatric/Behavioral: Negative  All other systems reviewed and are negative  Objective:      /66   Ht 5' 6" (1 676 m)   Wt 81 6 kg (180 lb)   BMI 29 05 kg/m²          Physical Exam   Constitutional: She is oriented to person, place, and time  She appears well-developed and well-nourished  No distress  HENT:   Head: Normocephalic and atraumatic  Right Ear: External ear normal    Left Ear: External ear normal    Nose: Nose normal    Mouth/Throat: Oropharynx is clear and moist  No oropharyngeal exudate  Eyes: Conjunctivae and EOM are normal  Pupils are equal, round, and reactive to light  Right eye exhibits no discharge  Left eye exhibits no discharge  No scleral icterus  Neck: Normal range of motion  Neck supple  No JVD present  No tracheal deviation present  No thyromegaly present  Cardiovascular: Normal rate, normal heart sounds and intact distal pulses  Exam reveals no gallop and no friction rub  No murmur heard  Pulmonary/Chest: Effort normal  No stridor  No respiratory distress  She has no wheezes  She has no rales  She exhibits no tenderness  Abdominal: Soft  Bowel sounds are normal  She exhibits no distension and no mass  There is no tenderness  There is no rebound and no guarding  Incisions healing well  Musculoskeletal: Normal range of motion  She exhibits no edema, tenderness or deformity  Lymphadenopathy:     She has no cervical adenopathy  Neurological: She is alert and oriented to person, place, and time  No cranial nerve deficit  Coordination normal    Skin: Skin is warm and dry  No rash noted  She is not diaphoretic  No erythema  No pallor  Psychiatric: She has a normal mood and affect  Her behavior is normal  Judgment and thought content normal    Nursing note and vitals reviewed  By signing my name below, Cookie Villaseñor, attest that this documentation has been prepared under the direction and in the presence of Evelyne Marin MD  Electonically Signed: Katty Trinh 5/31/2018  Carter Abebe, personally performed the services described in this documenation  All medical record entries made by the scribe were at my direction and in my presence  I have reviewed the chart and discharge instructions (if applicable) and agree that the record reflects my personal performance and is accurate and complete  Evelyne Marin MD  5/31/2018

## 2018-09-13 ENCOUNTER — OFFICE VISIT (OUTPATIENT)
Dept: FAMILY MEDICINE CLINIC | Facility: CLINIC | Age: 23
End: 2018-09-13
Payer: COMMERCIAL

## 2018-09-13 VITALS
HEART RATE: 80 BPM | DIASTOLIC BLOOD PRESSURE: 60 MMHG | WEIGHT: 195 LBS | HEIGHT: 67 IN | SYSTOLIC BLOOD PRESSURE: 118 MMHG | BODY MASS INDEX: 30.61 KG/M2 | RESPIRATION RATE: 12 BRPM

## 2018-09-13 DIAGNOSIS — E28.2 PCOS (POLYCYSTIC OVARIAN SYNDROME): ICD-10-CM

## 2018-09-13 DIAGNOSIS — Z23 NEED FOR TDAP VACCINATION: ICD-10-CM

## 2018-09-13 DIAGNOSIS — Z00.00 PE (PHYSICAL EXAM), ANNUAL: Primary | ICD-10-CM

## 2018-09-13 DIAGNOSIS — B37.3 YEAST VAGINITIS: ICD-10-CM

## 2018-09-13 PROBLEM — F41.9 ANXIETY: Status: ACTIVE | Noted: 2018-09-13

## 2018-09-13 PROCEDURE — 90715 TDAP VACCINE 7 YRS/> IM: CPT

## 2018-09-13 PROCEDURE — 90471 IMMUNIZATION ADMIN: CPT

## 2018-09-13 PROCEDURE — 99395 PREV VISIT EST AGE 18-39: CPT | Performed by: NURSE PRACTITIONER

## 2018-09-13 RX ORDER — FLUCONAZOLE 150 MG/1
150 TABLET ORAL ONCE
Qty: 1 TABLET | Refills: 0 | Status: SHIPPED | OUTPATIENT
Start: 2018-09-13 | End: 2018-09-13

## 2018-09-13 NOTE — PROGRESS NOTES
Subjective     Here today with request for general pe  Sil Porter is a 24 y o   female and is here for routine health maintenance  The patient reports no problems  Immunization History   Administered Date(s) Administered    DTP 1994, 1994, 1994, 10/06/1995, 05/05/1999    DTP / HiB 1994, 1994, 1994    DTaP 10/06/1995, 05/05/1999    DTaP 5 1994, 1994, 1994, 10/06/1995, 05/05/1999    Hep A, adult 04/11/2007, 03/25/2008    Hep A, ped/adol, 2 dose 04/11/2007, 03/25/2008    Hep B, Adolescent or Pediatric 1994, 1994, 1994    Hep B, adult 1994, 1994, 1994    Hepatitis A 04/11/2007    HiB 03/08/1995    IPV 1994, 1994, 10/06/1995, 05/05/1999    MMR 06/30/1995, 05/18/1998    Meningococcal Conjugate ([address]) 07/20/2005, 09/20/2011    Meningococcal MCV4P 07/20/2005    Meningococcal, Unknown Serogroups 07/20/2005    OPV 1994, 1994, 10/06/1995    Tdap 06/12/2006    Tuberculin Skin Test-PPD Intradermal 03/01/1995, 05/05/1999, 03/25/2008    Varicella 05/03/1996, 05/31/1996, 03/25/2008        History:  LMP: No LMP recorded  Dentist Visit within 6-12 months    The following portions of the patient's history were reviewed and updated as appropriate: allergies, current medications, past family history, past medical history, past social history, past surgical history and problem list       Review of Systems  Review of Systems   Constitutional: Negative  HENT: Negative  Respiratory: Negative  Cardiovascular: Negative  Genitourinary: Negative  Musculoskeletal: Negative  Skin: Negative  Neurological: Negative  Psychiatric/Behavioral: Negative            Objective   Vitals:    09/13/18 0951   BP: 118/60   Pulse: 80   Resp: 12     Wt Readings from Last 3 Encounters:   09/13/18 88 5 kg (195 lb)   05/31/18 81 6 kg (180 lb)   05/18/18 81 6 kg (180 lb)     BP Readings from Last 3 Encounters:   09/13/18 118/60   05/31/18 102/66   05/18/18 112/61     Pulse Readings from Last 3 Encounters:   09/13/18 80   05/18/18 88   05/17/18 72     BMI Readings from Last 3 Encounters:   09/13/18 30 54 kg/m²   05/31/18 29 05 kg/m²   05/18/18 29 05 kg/m²     Physical Exam   Constitutional: She is oriented to person, place, and time  She appears well-developed and well-nourished  Neck: Normal range of motion  Neck supple  Cardiovascular: Normal rate, regular rhythm and normal heart sounds  Pulmonary/Chest: Effort normal and breath sounds normal  No respiratory distress  She has no wheezes  Abdominal: Soft  Bowel sounds are normal  She exhibits no distension  There is no tenderness  Musculoskeletal: Normal range of motion  Neurological: She is alert and oriented to person, place, and time  Skin: Skin is warm and dry  Psychiatric: She has a normal mood and affect  Her behavior is normal  Judgment and thought content normal        Assessment/Plan     Healthy female exam     1  PE: this was conducted for you  2  Patient Counseling:  --Nutrition: Stressed importance of moderation in sodium/caffeine intake, saturated fat and cholesterol, caloric balance, sufficient intake of fresh fruits, vegetables, fiber, calcium, iron  --Exercise: Stressed the importance of regular exercise  --Injury prevention: Discussed safety belts, safety helmets, smoke detector, smoking near bedding or upholstery  --Dental health: Discussed importance of regular tooth brushing, flossing, and dental visits  --Immunizations reviewed  --Discussed benefits of screening colonoscopy  --After hours service discussed with patient    3  HX of PCOS: referred to [facility] endocrine for follow up  TDaP today   Refuses flu

## 2018-09-20 ENCOUNTER — OFFICE VISIT (OUTPATIENT)
Dept: ENDOCRINOLOGY | Facility: HOSPITAL | Age: 23
End: 2018-09-20
Payer: COMMERCIAL

## 2018-09-20 VITALS
DIASTOLIC BLOOD PRESSURE: 78 MMHG | HEIGHT: 67 IN | SYSTOLIC BLOOD PRESSURE: 120 MMHG | BODY MASS INDEX: 30.61 KG/M2 | HEART RATE: 90 BPM | WEIGHT: 195 LBS

## 2018-09-20 DIAGNOSIS — E28.2 PCOS (POLYCYSTIC OVARIAN SYNDROME): Primary | ICD-10-CM

## 2018-09-20 DIAGNOSIS — N91.2 AMENORRHEA: ICD-10-CM

## 2018-09-20 DIAGNOSIS — L68.0 HIRSUTISM: ICD-10-CM

## 2018-09-20 DIAGNOSIS — R63.5 WEIGHT GAIN: ICD-10-CM

## 2018-09-20 DIAGNOSIS — R73.03 PREDIABETES: ICD-10-CM

## 2018-09-20 DIAGNOSIS — R61 HYPERHIDROSIS: ICD-10-CM

## 2018-09-20 DIAGNOSIS — E03.9 HYPOTHYROIDISM, UNSPECIFIED TYPE: ICD-10-CM

## 2018-09-20 DIAGNOSIS — E55.9 VITAMIN D DEFICIENCY: ICD-10-CM

## 2018-09-20 PROCEDURE — 99244 OFF/OP CNSLTJ NEW/EST MOD 40: CPT | Performed by: INTERNAL MEDICINE

## 2018-09-20 RX ORDER — DEXAMETHASONE 1 MG
TABLET ORAL
Qty: 1 TABLET | Refills: 0 | Status: SHIPPED | OUTPATIENT
Start: 2018-09-20 | End: 2019-03-21 | Stop reason: ALTCHOICE

## 2018-09-20 NOTE — PROGRESS NOTES
9/20/2018    Assessment/Plan      Diagnoses and all orders for this visit:    PCOS (polycystic ovarian syndrome)  -     Cortisol Level, AM Specimen Lab Collect; Future  -     Dexamethasone; Future  -     dexamethasone (DECADRON) 1 mg tablet; Take 1 tab at 10 pm night before cortisol lab work  -     Cortisol, Free, Urine, 24 Hour; Future  -     Creatinine, urine, 24 hour; Future  -     Luteinizing hormone; Future  -     Follicle stimulating hormone; Future    Hypothyroidism, unspecified type    Hyperhidrosis  -     Cortisol Level, AM Specimen Lab Collect; Future  -     Dexamethasone; Future  -     dexamethasone (DECADRON) 1 mg tablet; Take 1 tab at 10 pm night before cortisol lab work  -     17-Hydroxyprogesterone- Lab Collect; Future  -     Insulin-like growth factor 1 (IGF-1) - Lab Collect; Future    Vitamin D deficiency  -     PTH, intact- Lab Collect; Future  -     Comprehensive metabolic panel Lab Collect; Future  -     Vitamin D 25 hydroxy Lab Collect; Future    Prediabetes  -     HEMOGLOBIN A1C W/ EAG ESTIMATION Lab Collect; Future    Weight gain  -     Cortisol Level, AM Specimen Lab Collect; Future  -     Dexamethasone; Future  -     dexamethasone (DECADRON) 1 mg tablet; Take 1 tab at 10 pm night before cortisol lab work  -     Insulin-like growth factor 1 (IGF-1) - Lab Collect; Future  -     Cortisol, Free, Urine, 24 Hour; Future  -     Creatinine, urine, 24 hour; Future    Amenorrhea  -     TSH, 3rd generation Lab Collect; Future  -     T4, free Lab Collect; Future  -     Prolactin- Lab Collect; Future  -     17-Hydroxyprogesterone- Lab Collect; Future  -     Cortisol, Free, Urine, 24 Hour; Future  -     Creatinine, urine, 24 hour; Future  -     hCG, quantitative- Lab Collect; Future    Hirsutism  -     DHEA-sulfate- Lab Collect; Future  -     Testosterone, free, total Lab Collect; Future        Assessment/Plan:  43-year-old female presents for consultation regarding possible PCOS    Given that she has had a change in symptomatology over the prior few months including increase in hirsutism, weight gain, energy, will screen again for Cushing's with a 1 mg overnight dexamethasone suppression test as well as a 24 urine for free cortisol  I have also ordered blood work for PCOS evaluation including a free and total testosterone, DHEA-S, 17 hydroxy progesterone, hCG, prolactin, TSH, free T4, IGF-1, LH, FSH  We will call her with these results and at that point I may suggest starting an oral contraceptive pill such as Sprintec  She does report a history of prediabetes so I have ordered a sugar which will be in the CMP as well as an A1c  She also has a history of vitamin-D deficiency side of order 25 hydroxy vitamin-D, PTH, [facility]  Will call her with the results  I will schedule follow-up in 6 months, but we will be in touch with her as results are available for additional steps in testing and/or management  CC: pcos    History of Present Illness     HPI: Han Roberts is a 24y o  year old female with history of reported history PCOS diagnosed around age 11 who presents for consultation  She was seen by an endocrinologist years ago for evaluation of weight gain, abnormal menstrual cycles  She states workup was negative and point more toward PCOS  In the past she was treated with metformin but this caused side effects and she discontinued this medication  She was on birth control pills in the past but has not been on it in some time  About 6 months ago, off of birth control pills, she was having normal menstrual cycles  Then she started noticing lack of menstrual cycle as well as increase in terminal hair growth especially on her chin and neck requiring mechanical treatment more often  She also has hirsutism on her abdomen  Additionally, she is concerned about an unintentional 15 lb weight gain over the prior few months  This is despite a healthy diet, mindful diet, active lifestyle    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  She does report some stretch marks on her sides  She does report some proximal muscle weakness  She denies any history of kidney stones or fragility fracture  She denies history of thyroid dysfunction but reports there is a history of thyroid disease in the family in her father had thyroid cancer  She had a normal thyroid ultrasound done in 2016  She denies known family history of pituitary or adrenal gland dysfunction  Additionally, she feels worn out in fatigue lately  She does report breaking out in terms of oily skin and blemishes recently  Review of Systems   Constitutional: Positive for fatigue and unexpected weight change (gain)  HENT: Negative for trouble swallowing and voice change  Eyes: Negative for visual disturbance  Respiratory: Negative for shortness of breath  Cardiovascular: Negative for palpitations and leg swelling  Gastrointestinal: Negative for abdominal pain, nausea and vomiting  Endocrine: Positive for heat intolerance  Negative for polydipsia and polyuria  Musculoskeletal: Negative for arthralgias and myalgias  Skin: Positive for rash (acne)  Neurological: Negative for dizziness, tremors and weakness  Hematological: Negative for adenopathy  Psychiatric/Behavioral: Negative for agitation and confusion         Historical Information   Past Medical History:   Diagnosis Date    Allergic     Anxiety     Depression     Last assessed 6/5/2013    Disease of thyroid gland     GERD (gastroesophageal reflux disease)      Past Surgical History:   Procedure Laterality Date    CHOLECYSTECTOMY LAPAROSCOPIC N/A 5/18/2018    Procedure: CHOLECYSTECTOMY LAPAROSCOPIC;  Surgeon: Laura Flores MD;  [facility]: [facility] ([facility] OR;  Service: General    GALLBLADDER SURGERY  05/29/2018    TONSILLECTOMY      With Adenoidectomy     Social History   History   Alcohol Use    Yes     Comment: socially     History   Drug Use No     History Smoking Status    Former Smoker    Quit date: 5/17/2012   Smokeless Tobacco    Never Used     Comment: Per [employer]; Never a smoker     Family History:   Family History   Problem Relation Age of Onset    No Known Problems Mother     Hypertension Father     Thyroid cancer Father     Substance Abuse Neg Hx     Mental illness Neg Hx        Meds/Allergies   Current Outpatient Prescriptions   Medication Sig Dispense Refill    dexamethasone (DECADRON) 1 mg tablet Take 1 tab at 10 pm night before cortisol lab work  1 tablet 0     No current facility-administered medications for this visit  Allergies   Allergen Reactions    Other Allergic Rhinitis     Category: Seasonal allergies;        Objective   Vitals: Blood pressure 120/78, pulse 90, height 5' 7" (1 702 m), weight 88 5 kg (195 lb)  Invasive Devices          No matching active lines, drains, or airways          Physical Exam   Constitutional: She is oriented to person, place, and time  She appears well-developed and well-nourished  No distress  HENT:   Head: Normocephalic and atraumatic  Eyes: Conjunctivae are normal  Pupils are equal, round, and reactive to light  Neck: Normal range of motion  Neck supple  No thyromegaly present  Cardiovascular: Normal rate and regular rhythm  No murmur heard  Pulmonary/Chest: Effort normal and breath sounds normal  No respiratory distress  Abdominal: Soft  Bowel sounds are normal  She exhibits no distension  Musculoskeletal: Normal range of motion  She exhibits no edema  Lymphadenopathy:     She has no cervical adenopathy  Neurological: She is alert and oriented to person, place, and time  Skin: Skin is warm and dry  No rash noted  She is not diaphoretic  Psychiatric: She has a normal mood and affect  Her behavior is normal    Vitals reviewed  The history was obtained from the review of the chart and from the patient      Lab Results:      Recent Results (from the past 43073 hour(s)) ECG 12 lead    Collection Time: 05/12/18  7:56 AM   Result Value Ref Range    Ventricular Rate 63 BPM    Atrial Rate 63 BPM    KS Interval 182 ms    QRSD Interval 94 ms    QT Interval 410 ms    QTC Interval 419 ms    P Axis 37 degrees    QRS Axis 83 degrees    T Wave Axis 52 degrees   Comprehensive metabolic panel    Collection Time: 05/12/18  7:58 AM   Result Value Ref Range    Sodium 141 136 - 145 mmol/L    Potassium 3 4 (L) 3 5 - 5 3 mmol/L    Chloride 103 100 - 108 mmol/L    CO2 30 21 - 32 mmol/L    ANION GAP 8 4 - 13 mmol/L    BUN 13 5 - 25 mg/dL    Creatinine 0 71 0 60 - 1 30 mg/dL    Glucose 115 65 - 140 mg/dL    Calcium 9 1 8 3 - 10 1 mg/dL    AST 17 5 - 45 U/L    ALT 23 12 - 78 U/L    Alkaline Phosphatase 54 46 - 116 U/L    Total Protein 8 0 6 4 - 8 2 g/dL    Albumin 4 0 3 5 - 5 0 g/dL    Total Bilirubin 0 30 0 20 - 1 00 mg/dL    eGFR 120 ml/min/1 73sq m   CBC and differential    Collection Time: 05/12/18  7:58 AM   Result Value Ref Range    WBC 10 12 4 31 - 10 16 Thousand/uL    RBC 4 23 3 81 - 5 12 Million/uL    Hemoglobin 13 5 11 5 - 15 4 g/dL    Hematocrit 39 2 34 8 - 46 1 %    MCV 93 82 - 98 fL    MCH 31 9 26 8 - 34 3 pg    MCHC 34 4 31 4 - 37 4 g/dL    RDW 12 4 11 6 - 15 1 %    MPV 10 1 8 9 - 12 7 fL    Platelets 665 239 - 974 Thousands/uL    Neutrophils Relative 76 (H) 43 - 75 %    Lymphocytes Relative 16 14 - 44 %    Monocytes Relative 8 4 - 12 %    Eosinophils Relative 0 0 - 6 %    Basophils Relative 0 0 - 1 %    Neutrophils Absolute 7 72 (H) 1 85 - 7 62 Thousands/µL    Lymphocytes Absolute 1 59 0 60 - 4 47 Thousands/µL    Monocytes Absolute 0 78 0 17 - 1 22 Thousand/µL    Eosinophils Absolute 0 01 0 00 - 0 61 Thousand/µL    Basophils Absolute 0 02 0 00 - 0 10 Thousands/µL   Lipase    Collection Time: 05/12/18  7:58 AM   Result Value Ref Range    Lipase 81 73 - 393 u/L   Protime-INR    Collection Time: 05/12/18  7:58 AM   Result Value Ref Range    Protime 13 5 12 1 - 14 4 seconds    INR 1 05 0 86 - 1 16   APTT    Collection Time: 05/12/18  7:58 AM   Result Value Ref Range    PTT 26 23 - 35 seconds   Type and screen    Collection Time: 05/12/18  7:58 AM   Result Value Ref Range    ABO Grouping A     Rh Factor Positive     Antibody Screen Negative     Specimen Expiration Date [id number]    Pregnancy Test (HCG Qualitative)    Collection Time: 05/12/18  7:58 AM   Result Value Ref Range    Preg, Serum Negative Negative   CBC and differential    Collection Time: 05/17/18 12:54 PM   Result Value Ref Range    WBC 16 74 (H) 4 31 - 10 16 Thousand/uL    RBC 4 28 3 81 - 5 12 Million/uL    Hemoglobin 13 7 11 5 - 15 4 g/dL    Hematocrit 38 4 34 8 - 46 1 %    MCV 90 82 - 98 fL    MCH 32 0 26 8 - 34 3 pg    MCHC 35 7 31 4 - 37 4 g/dL    RDW 12 0 11 6 - 15 1 %    MPV 10 7 8 9 - 12 7 fL    Platelets 835 865 - 634 Thousands/uL    Neutrophils Relative 89 (H) 43 - 75 %    Lymphocytes Relative 4 (L) 14 - 44 %    Monocytes Relative 7 4 - 12 %    Eosinophils Relative 0 0 - 6 %    Basophils Relative 0 0 - 1 %    Neutrophils Absolute 14 96 (H) 1 85 - 7 62 Thousands/µL    Lymphocytes Absolute 0 67 0 60 - 4 47 Thousands/µL    Monocytes Absolute 1 10 0 17 - 1 22 Thousand/µL    Eosinophils Absolute 0 00 0 00 - 0 61 Thousand/µL    Basophils Absolute 0 01 0 00 - 0 10 Thousands/µL   Comprehensive metabolic panel    Collection Time: 05/17/18 12:54 PM   Result Value Ref Range    Sodium 135 (L) 136 - 145 mmol/L    Potassium 3 5 3 5 - 5 3 mmol/L    Chloride 98 (L) 100 - 108 mmol/L    CO2 26 21 - 32 mmol/L    ANION GAP 11 4 - 13 mmol/L    BUN 8 5 - 25 mg/dL    Creatinine 0 57 (L) 0 60 - 1 30 mg/dL    Glucose 110 65 - 140 mg/dL    Calcium 8 9 8 3 - 10 1 mg/dL    AST 21 5 - 45 U/L    ALT 20 12 - 78 U/L    Alkaline Phosphatase 52 46 - 116 U/L    Total Protein 7 9 6 4 - 8 2 g/dL    Albumin 3 8 3 5 - 5 0 g/dL    Total Bilirubin 0 50 0 20 - 1 00 mg/dL    eGFR 130 ml/min/1 73sq m   Lipase    Collection Time: 05/17/18 12:54 PM   Result Value Ref Range Lipase 113 73 - 393 u/L   POCT urinalysis dipstick    Collection Time: 05/17/18  1:45 PM   Result Value Ref Range    Color, UA YELLOW     Clarity, UA CLEAR     EXT Glucose, UA (Ref: Negative) NEG     EXT Bilirubin, UA (Ref: Negative) NEG     Ketones, UA (Ref: Negative) 160     EXT Spec Grav, UA (Ref:1 003-1 030) 1 015     Blood, UA (Ref: Negative) TRACE     EXT pH, UA (Ref: 4 5-8 0) 6 5     EXT Protein, UA (Ref: Negative) NEG     Urobilinogen, UA (Ref: 0 2- 1 0) 0 2      Leukocytes, UA (Ref: Negative) NEG     Nitrite, UA (Ref: Negative) NEG    POCT pregnancy, urine    Collection Time: 05/17/18  2:00 PM   Result Value Ref Range    EXT PREG TEST UR (Ref: Negative) NEG    Comprehensive metabolic panel    Collection Time: 05/18/18  5:35 AM   Result Value Ref Range    Sodium 143 136 - 145 mmol/L    Potassium 3 7 3 5 - 5 3 mmol/L    Chloride 107 100 - 108 mmol/L    CO2 28 21 - 32 mmol/L    ANION GAP 8 4 - 13 mmol/L    BUN 9 5 - 25 mg/dL    Creatinine 0 74 0 60 - 1 30 mg/dL    Glucose 86 65 - 140 mg/dL    Calcium 8 3 8 3 - 10 1 mg/dL    AST 20 5 - 45 U/L    ALT 17 12 - 78 U/L    Alkaline Phosphatase 41 (L) 46 - 116 U/L    Total Protein 6 9 6 4 - 8 2 g/dL    Albumin 3 1 (L) 3 5 - 5 0 g/dL    Total Bilirubin 0 60 0 20 - 1 00 mg/dL    eGFR 114 ml/min/1 73sq m   Magnesium    Collection Time: 05/18/18  5:35 AM   Result Value Ref Range    Magnesium 1 9 1 6 - 2 6 mg/dL   CBC (With Platelets)    Collection Time: 05/18/18  5:35 AM   Result Value Ref Range    WBC 11 02 (H) 4 31 - 10 16 Thousand/uL    RBC 3 88 3 81 - 5 12 Million/uL    Hemoglobin 12 0 11 5 - 15 4 g/dL    Hematocrit 36 5 34 8 - 46 1 %    MCV 94 82 - 98 fL    MCH 30 9 26 8 - 34 3 pg    MCHC 32 9 31 4 - 37 4 g/dL    RDW 12 4 11 6 - 15 1 %    Platelets 422 094 - 128 Thousands/uL    MPV 10 9 8 9 - 12 7 fL   Phosphorus    Collection Time: 05/18/18  5:35 AM   Result Value Ref Range    Phosphorus 2 9 2 7 - 4 5 mg/dL   Tissue Exam    Collection Time: 05/18/18 12:46 PM Result Value Ref Range    Case Report       Surgical Pathology Report                         Case: [id number]                                   Authorizing Provider:  Jama Edwards MD       Collected:           05/18/2018 1246              Ordering Location:     [facility]        Received:            05/18/2018 [place] [address] Operating Room                                                    Pathologist:           Shan Carcamo MD                                                              Specimen:    Gallbladder                                                                                Final Diagnosis       A  Gallbladder, cholecystectomy:    -Marked acute hemorrhagic& necrotizing cholecystitis, acalculous   -Background of moderate chronic cholecystitis  Note       Interpretation performed at [address]      Additional Information       All controls performed with the immunohistochemical stains reported above reacted appropriately  These tests were developed and their performance characteristics determined by [facility] or [facility]  They may not be cleared or approved by the [place]  Food and Drug Administration  The FDA has determined that such clearance or approval is not necessary  These tests are used for clinical purposes  They should not be regarded as investigational or for research  This laboratory has been approved by CLIA 88, designated as a high-complexity laboratory and is qualified to perform these tests  Gross Description       A  The specimen is received in formalin, labeled with the patient's name and hospital number, and is designated "gallbladder  The specimen consists of a previously opened, fragmented gallbladder measuring 7 5 x 4 0 x 2 5 cm in greatest dimension  The serosa is tan-pink, smooth and glistening, focally disrupted    The cystic duct margin is located detached from the gallbladder and in the specimen container with an attached metal clip  The cystic duct margin is inked blue  The mucosa is tan-red, bile stained, focally hemorrhagic, focally flattened  No bile or stones are identified  The gallbladder wall ranges in thickness from less than 0 1-0 2 cm in is heavily edematous  Representative sections are submitted in 1 cassette  Note: The estimated total formalin fixation time based upon information provided by the submitting clinician and the standard processing schedule is under 72 hours  [facility] Hospital[facility]iguel [facility]ngel           No future appointments

## 2018-09-20 NOTE — LETTER
September 20, 2018     Donovan Wyman, [address] [address] [place] [address] [address]    Patient: Nico Soriano   YOB: 1995   Date of Visit: 9/20/2018       Dear Dr Milton Busby:    Thank you for referring Nico Soriano to me for evaluation  Below are my notes for this consultation  If you have questions, please do not hesitate to call me  I look forward to following your patient along with you  Sincerely,        Megan Waddell DO        CC: No Recipients  Megan Waddell DO  9/20/2018  1:29 PM  Sign at close encounter  9/20/2018    Assessment/Plan      Diagnoses and all orders for this visit:    PCOS (polycystic ovarian syndrome)  -     Cortisol Level, AM Specimen Lab Collect; Future  -     Dexamethasone; Future  -     dexamethasone (DECADRON) 1 mg tablet; Take 1 tab at 10 pm night before cortisol lab work  -     Cortisol, Free, Urine, 24 Hour; Future  -     Creatinine, urine, 24 hour; Future  -     Luteinizing hormone; Future  -     Follicle stimulating hormone; Future    Hypothyroidism, unspecified type    Hyperhidrosis  -     Cortisol Level, AM Specimen Lab Collect; Future  -     Dexamethasone; Future  -     dexamethasone (DECADRON) 1 mg tablet; Take 1 tab at 10 pm night before cortisol lab work  -     17-Hydroxyprogesterone- Lab Collect; Future  -     Insulin-like growth factor 1 (IGF-1) - Lab Collect; Future    Vitamin D deficiency  -     PTH, intact- Lab Collect; Future  -     Comprehensive metabolic panel Lab Collect; Future  -     Vitamin D 25 hydroxy Lab Collect; Future    Prediabetes  -     HEMOGLOBIN A1C W/ EAG ESTIMATION Lab Collect; Future    Weight gain  -     Cortisol Level, AM Specimen Lab Collect; Future  -     Dexamethasone; Future  -     dexamethasone (DECADRON) 1 mg tablet; Take 1 tab at 10 pm night before cortisol lab work  -     Insulin-like growth factor 1 (IGF-1) - Lab Collect;  Future  -     Cortisol, Free, Urine, 24 Hour; Future  - Creatinine, urine, 24 hour; Future    Amenorrhea  -     TSH, 3rd generation Lab Collect; Future  -     T4, free Lab Collect; Future  -     Prolactin- Lab Collect; Future  -     17-Hydroxyprogesterone- Lab Collect; Future  -     Cortisol, Free, Urine, 24 Hour; Future  -     Creatinine, urine, 24 hour; Future  -     hCG, quantitative- Lab Collect; Future    Hirsutism  -     DHEA-sulfate- Lab Collect; Future  -     Testosterone, free, total Lab Collect; Future        Assessment/Plan:  48-year-old female presents for consultation regarding possible PCOS  Given that she has had a change in symptomatology over the prior few months including increase in hirsutism, weight gain, energy, will screen again for Cushing's with a 1 mg overnight dexamethasone suppression test as well as a 24 urine for free cortisol  I have also ordered blood work for PCOS evaluation including a free and total testosterone, DHEA-S, 17 hydroxy progesterone, hCG, prolactin, TSH, free T4, IGF-1, LH, FSH  We will call her with these results and at that point I may suggest starting an oral contraceptive pill such as Sprintec  She does report a history of prediabetes so I have ordered a sugar which will be in the CMP as well as an A1c  She also has a history of vitamin-D deficiency side of order 25 hydroxy vitamin-D, PTH, CMP  Will call her with the results  I will schedule follow-up in 6 months, but we will be in touch with her as results are available for additional steps in testing and/or management  CC: pcos    History of Present Illness     HPI: Kristel Crawford is a 24y o  year old female with history of reported history PCOS diagnosed around age 11 who presents for consultation  She was seen by an endocrinologist years ago for evaluation of weight gain, abnormal menstrual cycles  She states workup was negative and point more toward PCOS    In the past she was treated with metformin but this caused side effects and she discontinued this medication  She was on birth control pills in the past but has not been on it in some time  About 6 months ago, off of birth control pills, she was having normal menstrual cycles  Then she started noticing lack of menstrual cycle as well as increase in terminal hair growth especially on her chin and neck requiring mechanical treatment more often  She also has hirsutism on her abdomen  Additionally, she is concerned about an unintentional 15 lb weight gain over the prior few months  This is despite a healthy diet, mindful diet, active lifestyle  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  She does report some stretch marks on her sides  She does report some proximal muscle weakness  She denies any history of kidney stones or fragility fracture  She denies history of thyroid dysfunction but reports there is a history of thyroid disease in the family in her father had thyroid cancer  She had a normal thyroid ultrasound done in 2016  She denies known family history of pituitary or adrenal gland dysfunction  Additionally, she feels worn out in fatigue lately  She does report breaking out in terms of oily skin and blemishes recently  Review of Systems   Constitutional: Positive for fatigue and unexpected weight change (gain)  HENT: Negative for trouble swallowing and voice change  Eyes: Negative for visual disturbance  Respiratory: Negative for shortness of breath  Cardiovascular: Negative for palpitations and leg swelling  Gastrointestinal: Negative for abdominal pain, nausea and vomiting  Endocrine: Positive for heat intolerance  Negative for polydipsia and polyuria  Musculoskeletal: Negative for arthralgias and myalgias  Skin: Positive for rash (acne)  Neurological: Negative for dizziness, tremors and weakness  Hematological: Negative for adenopathy  Psychiatric/Behavioral: Negative for agitation and confusion         Historical Information   Past Medical History:   Diagnosis Date    Allergic     Anxiety     Depression     Last assessed 6/5/2013    Disease of thyroid gland     GERD (gastroesophageal reflux disease)      Past Surgical History:   Procedure Laterality Date    CHOLECYSTECTOMY LAPAROSCOPIC N/A 5/18/2018    Procedure: CHOLECYSTECTOMY LAPAROSCOPIC;  Surgeon: Jacqueline Nava MD;  Location:  MAIN OR;  Service: General    GALLBLADDER SURGERY  05/29/2018    TONSILLECTOMY      With Adenoidectomy     Social History   History   Alcohol Use    Yes     Comment: socially     History   Drug Use No     History   Smoking Status    Former Smoker    Quit date: 5/17/2012   Smokeless Tobacco    Never Used     Comment: Per [employer]; Never a smoker     Family History:   Family History   Problem Relation Age of Onset    No Known Problems Mother     Hypertension Father     Thyroid cancer Father     Substance Abuse Neg Hx     Mental illness Neg Hx        Meds/Allergies   Current Outpatient Prescriptions   Medication Sig Dispense Refill    dexamethasone (DECADRON) 1 mg tablet Take 1 tab at 10 pm night before cortisol lab work  1 tablet 0     No current facility-administered medications for this visit  Allergies   Allergen Reactions    Other Allergic Rhinitis     Category: Seasonal allergies;        Objective   Vitals: Blood pressure 120/78, pulse 90, height 5' 7" (1 702 m), weight 88 5 kg (195 lb)  Invasive Devices          No matching active lines, drains, or airways          Physical Exam   Constitutional: She is oriented to person, place, and time  She appears well-developed and well-nourished  No distress  HENT:   Head: Normocephalic and atraumatic  Eyes: Conjunctivae are normal  Pupils are equal, round, and reactive to light  Neck: Normal range of motion  Neck supple  No thyromegaly present  Cardiovascular: Normal rate and regular rhythm  No murmur heard    Pulmonary/Chest: Effort normal and breath sounds normal  No respiratory distress  Abdominal: Soft  Bowel sounds are normal  She exhibits no distension  Musculoskeletal: Normal range of motion  She exhibits no edema  Lymphadenopathy:     She has no cervical adenopathy  Neurological: She is alert and oriented to person, place, and time  Skin: Skin is warm and dry  No rash noted  She is not diaphoretic  Psychiatric: She has a normal mood and affect  Her behavior is normal    Vitals reviewed  The history was obtained from the review of the chart and from the patient      Lab Results:      Recent Results (from the past 97545 hour(s))   ECG 12 lead    Collection Time: 05/12/18  7:56 AM   Result Value Ref Range    Ventricular Rate 63 BPM    Atrial Rate 63 BPM    CT Interval 182 ms    QRSD Interval 94 ms    QT Interval 410 ms    QTC Interval 419 ms    P Axis 37 degrees    QRS Axis 83 degrees    T Wave Axis 52 degrees   Comprehensive metabolic panel    Collection Time: 05/12/18  7:58 AM   Result Value Ref Range    Sodium 141 136 - 145 mmol/L    Potassium 3 4 (L) 3 5 - 5 3 mmol/L    Chloride 103 100 - 108 mmol/L    CO2 30 21 - 32 mmol/L    ANION GAP 8 4 - 13 mmol/L    BUN 13 5 - 25 mg/dL    Creatinine 0 71 0 60 - 1 30 mg/dL    Glucose 115 65 - 140 mg/dL    Calcium 9 1 8 3 - 10 1 mg/dL    AST 17 5 - 45 U/L    ALT 23 12 - 78 U/L    Alkaline Phosphatase 54 46 - 116 U/L    Total Protein 8 0 6 4 - 8 2 g/dL    Albumin 4 0 3 5 - 5 0 g/dL    Total Bilirubin 0 30 0 20 - 1 00 mg/dL    eGFR 120 ml/min/1 73sq m   CBC and differential    Collection Time: 05/12/18  7:58 AM   Result Value Ref Range    WBC 10 12 4 31 - 10 16 Thousand/uL    RBC 4 23 3 81 - 5 12 Million/uL    Hemoglobin 13 5 11 5 - 15 4 g/dL    Hematocrit 39 2 34 8 - 46 1 %    MCV 93 82 - 98 fL    MCH 31 9 26 8 - 34 3 pg    MCHC 34 4 31 4 - 37 4 g/dL    RDW 12 4 11 6 - 15 1 %    MPV 10 1 8 9 - 12 7 fL    Platelets 076 883 - 273 Thousands/uL    Neutrophils Relative 76 (H) 43 - 75 %    Lymphocytes Relative 16 14 - 44 %    Monocytes Relative 8 4 - 12 %    Eosinophils Relative 0 0 - 6 %    Basophils Relative 0 0 - 1 %    Neutrophils Absolute 7 72 (H) 1 85 - 7 62 Thousands/µL    Lymphocytes Absolute 1 59 0 60 - 4 47 Thousands/µL    Monocytes Absolute 0 78 0 17 - 1 22 Thousand/µL    Eosinophils Absolute 0 01 0 00 - 0 61 Thousand/µL    Basophils Absolute 0 02 0 00 - 0 10 Thousands/µL   Lipase    Collection Time: 05/12/18  7:58 AM   Result Value Ref Range    Lipase 81 73 - 393 u/L   Protime-INR    Collection Time: 05/12/18  7:58 AM   Result Value Ref Range    Protime 13 5 12 1 - 14 4 seconds    INR 1 05 0 86 - 1 16   APTT    Collection Time: 05/12/18  7:58 AM   Result Value Ref Range    PTT 26 23 - 35 seconds   Type and screen    Collection Time: 05/12/18  7:58 AM   Result Value Ref Range    ABO Grouping A     Rh Factor Positive     Antibody Screen Negative     Specimen Expiration Date [id number]    Pregnancy Test (HCG Qualitative)    Collection Time: 05/12/18  7:58 AM   Result Value Ref Range    Preg, Serum Negative Negative   CBC and differential    Collection Time: 05/17/18 12:54 PM   Result Value Ref Range    WBC 16 74 (H) 4 31 - 10 16 Thousand/uL    RBC 4 28 3 81 - 5 12 Million/uL    Hemoglobin 13 7 11 5 - 15 4 g/dL    Hematocrit 38 4 34 8 - 46 1 %    MCV 90 82 - 98 fL    MCH 32 0 26 8 - 34 3 pg    MCHC 35 7 31 4 - 37 4 g/dL    RDW 12 0 11 6 - 15 1 %    MPV 10 7 8 9 - 12 7 fL    Platelets 417 387 - 260 Thousands/uL    Neutrophils Relative 89 (H) 43 - 75 %    Lymphocytes Relative 4 (L) 14 - 44 %    Monocytes Relative 7 4 - 12 %    Eosinophils Relative 0 0 - 6 %    Basophils Relative 0 0 - 1 %    Neutrophils Absolute 14 96 (H) 1 85 - 7 62 Thousands/µL    Lymphocytes Absolute 0 67 0 60 - 4 47 Thousands/µL    Monocytes Absolute 1 10 0 17 - 1 22 Thousand/µL    Eosinophils Absolute 0 00 0 00 - 0 61 Thousand/µL    Basophils Absolute 0 01 0 00 - 0 10 Thousands/µL   Comprehensive metabolic panel    Collection Time: 05/17/18 12:54 PM   Result Value Ref Range    Sodium 135 (L) 136 - 145 mmol/L    Potassium 3 5 3 5 - 5 3 mmol/L    Chloride 98 (L) 100 - 108 mmol/L    CO2 26 21 - 32 mmol/L    ANION GAP 11 4 - 13 mmol/L    BUN 8 5 - 25 mg/dL    Creatinine 0 57 (L) 0 60 - 1 30 mg/dL    Glucose 110 65 - 140 mg/dL    Calcium 8 9 8 3 - 10 1 mg/dL    AST 21 5 - 45 U/L    ALT 20 12 - 78 U/L    Alkaline Phosphatase 52 46 - 116 U/L    Total Protein 7 9 6 4 - 8 2 g/dL    Albumin 3 8 3 5 - 5 0 g/dL    Total Bilirubin 0 50 0 20 - 1 00 mg/dL    eGFR 130 ml/min/1 73sq m   Lipase    Collection Time: 05/17/18 12:54 PM   Result Value Ref Range    Lipase 113 73 - 393 u/L   POCT urinalysis dipstick    Collection Time: 05/17/18  1:45 PM   Result Value Ref Range    Color, UA YELLOW     Clarity, UA CLEAR     EXT Glucose, UA (Ref: Negative) NEG     EXT Bilirubin, UA (Ref: Negative) NEG     Ketones, UA (Ref: Negative) 160     EXT Spec Grav, UA (Ref:1 003-1 030) 1 015     Blood, UA (Ref: Negative) TRACE     EXT pH, UA (Ref: 4 5-8 0) 6 5     EXT Protein, UA (Ref: Negative) NEG     Urobilinogen, UA (Ref: 0 2- 1 0) 0 2      Leukocytes, UA (Ref: Negative) NEG     Nitrite, UA (Ref: Negative) NEG    POCT pregnancy, urine    Collection Time: 05/17/18  2:00 PM   Result Value Ref Range    EXT PREG TEST UR (Ref: Negative) NEG    Comprehensive metabolic panel    Collection Time: 05/18/18  5:35 AM   Result Value Ref Range    Sodium 143 136 - 145 mmol/L    Potassium 3 7 3 5 - 5 3 mmol/L    Chloride 107 100 - 108 mmol/L    CO2 28 21 - 32 mmol/L    ANION GAP 8 4 - 13 mmol/L    BUN 9 5 - 25 mg/dL    Creatinine 0 74 0 60 - 1 30 mg/dL    Glucose 86 65 - 140 mg/dL    Calcium 8 3 8 3 - 10 1 mg/dL    AST 20 5 - 45 U/L    ALT 17 12 - 78 U/L    Alkaline Phosphatase 41 (L) 46 - 116 U/L    Total Protein 6 9 6 4 - 8 2 g/dL    Albumin 3 1 (L) 3 5 - 5 0 g/dL    Total Bilirubin 0 60 0 20 - 1 00 mg/dL    eGFR 114 ml/min/1 73sq m   Magnesium    Collection Time: 05/18/18  5:35 AM   Result Value Ref Range    Magnesium 1 9 1 6 - 2 6 mg/dL   CBC (With Platelets)    Collection Time: 05/18/18  5:35 AM   Result Value Ref Range    WBC 11 02 (H) 4 31 - 10 16 Thousand/uL    RBC 3 88 3 81 - 5 12 Million/uL    Hemoglobin 12 0 11 5 - 15 4 g/dL    Hematocrit 36 5 34 8 - 46 1 %    MCV 94 82 - 98 fL    MCH 30 9 26 8 - 34 3 pg    MCHC 32 9 31 4 - 37 4 g/dL    RDW 12 4 11 6 - 15 1 %    Platelets 231 321 - 128 Thousands/uL    MPV 10 9 8 9 - 12 7 fL   Phosphorus    Collection Time: 05/18/18  5:35 AM   Result Value Ref Range    Phosphorus 2 9 2 7 - 4 5 mg/dL   Tissue Exam    Collection Time: 05/18/18 12:46 PM   Result Value Ref Range    Case Report       Surgical Pathology Report                         Case: [id number]                                   Authorizing Provider:  Georgia Wick MD       Collected:           05/18/2018 1246              Ordering Location:     [facility]        Received:            05/18/2018 [address] [place] [address] Operating Room                                                    Pathologist:           Rea Emerson MD                                                              Specimen:    Gallbladder                                                                                Final Diagnosis       A  Gallbladder, cholecystectomy:    -Marked acute hemorrhagic& necrotizing cholecystitis, acalculous   -Background of moderate chronic cholecystitis  Note       Interpretation performed at [address]      Additional Information       All controls performed with the immunohistochemical stains reported above reacted appropriately  These tests were developed and their performance characteristics determined by [facility] or [facility]  They may not be cleared or approved by the [place]  Food and Drug Administration  The FDA has determined that such clearance or approval is not necessary   These tests are used for clinical purposes  They should not be regarded as investigational or for research  This laboratory has been approved by [facility], designated as a high-complexity laboratory and is qualified to perform these tests  Gross Description       A  The specimen is received in formalin, labeled with the patient's name and hospital number, and is designated "gallbladder  The specimen consists of a previously opened, fragmented gallbladder measuring 7 5 x 4 0 x 2 5 cm in greatest dimension  The serosa is tan-pink, smooth and glistening, focally disrupted  The cystic duct margin is located detached from the gallbladder and in the specimen container with an attached metal clip  The cystic duct margin is inked blue  The mucosa is tan-red, bile stained, focally hemorrhagic, focally flattened  No bile or stones are identified  The gallbladder wall ranges in thickness from less than 0 1-0 2 cm in is heavily edematous  Representative sections are submitted in 1 cassette  Note: The estimated total formalin fixation time based upon information provided by the submitting clinician and the standard processing schedule is under 72 hours  AGereymundo           No future appointments

## 2018-10-18 LAB
EXTERNAL CHLAMYDIA RESULT: NOT DETECTED
N GONORRHOEA RRNA SPEC QL PROBE: NOT DETECTED

## 2019-03-21 ENCOUNTER — OFFICE VISIT (OUTPATIENT)
Dept: FAMILY MEDICINE CLINIC | Facility: CLINIC | Age: 24
End: 2019-03-21
Payer: COMMERCIAL

## 2019-03-21 ENCOUNTER — TELEPHONE (OUTPATIENT)
Dept: FAMILY MEDICINE CLINIC | Facility: CLINIC | Age: 24
End: 2019-03-21

## 2019-03-21 VITALS
HEART RATE: 84 BPM | WEIGHT: 210 LBS | SYSTOLIC BLOOD PRESSURE: 110 MMHG | DIASTOLIC BLOOD PRESSURE: 62 MMHG | RESPIRATION RATE: 12 BRPM | BODY MASS INDEX: 31.83 KG/M2 | HEIGHT: 68 IN

## 2019-03-21 DIAGNOSIS — F41.9 ANXIETY: Primary | ICD-10-CM

## 2019-03-21 PROCEDURE — 1036F TOBACCO NON-USER: CPT | Performed by: NURSE PRACTITIONER

## 2019-03-21 PROCEDURE — 99214 OFFICE O/P EST MOD 30 MIN: CPT | Performed by: NURSE PRACTITIONER

## 2019-03-21 PROCEDURE — 3008F BODY MASS INDEX DOCD: CPT | Performed by: NURSE PRACTITIONER

## 2019-03-21 RX ORDER — ESCITALOPRAM OXALATE 10 MG/1
10 TABLET ORAL DAILY
Qty: 90 TABLET | Refills: 3 | Status: SHIPPED | OUTPATIENT
Start: 2019-03-21 | End: 2019-12-30 | Stop reason: ALTCHOICE

## 2019-03-21 RX ORDER — CLONAZEPAM 0.5 MG/1
0.5 TABLET ORAL 2 TIMES DAILY
Qty: 60 TABLET | Refills: 3 | Status: SHIPPED | OUTPATIENT
Start: 2019-03-21 | End: 2019-12-30 | Stop reason: ALTCHOICE

## 2019-03-21 RX ORDER — ACETAMINOPHEN 160 MG
2000 TABLET,DISINTEGRATING ORAL DAILY
COMMUNITY
End: 2020-07-22

## 2019-03-21 RX ORDER — CLONAZEPAM 0.5 MG/1
0.5 TABLET, ORALLY DISINTEGRATING ORAL 2 TIMES DAILY
Qty: 60 TABLET | Refills: 1 | Status: SHIPPED | OUTPATIENT
Start: 2019-03-21 | End: 2019-03-21 | Stop reason: ALTCHOICE

## 2019-03-21 NOTE — TELEPHONE ENCOUNTER
Pharmacy states for the clonazepam script disintegrating tablets were chosen and they are super expensive  Did kayce mean to order this?  If not send another  E script for clonazepam

## 2019-03-21 NOTE — PROGRESS NOTES
Assessment/Plan:    1  Anxiety  We will restart the medications  Please consider getting the " Anxiety and Phobia Workbook"  If you change your mind about counseling please give us a call     - escitalopram (LEXAPRO) 10 mg tablet; Take 1 tablet (10 mg total) by mouth daily  Dispense: 90 tablet; Refill: 3  - clonazePAM (KlonoPIN) 0 5 MG disintegrating tablet; Take 1 tablet (0 5 mg total) by mouth 2 (two) times a day  Dispense: 60 tablet; Refill: 1    rto 3 months to carlos and call as needed  Subjective:   BMI Counseling: Body mass index is 32 41 kg/m²  The BMI is above normal  Nutrition recommendations include reducing portion sizes, decreasing overall calorie intake and 3-5 servings of fruits/vegetables daily  Exercise recommendations include moderate aerobic physical activity for 150 minutes/week  Patient ID: Lawanda Madrigal is a 21 y o  female  Here today withj concern regarding panic attacks  Has had this issue for many years and has been on Lexapro and Klonapin prn with good success  Has not been on anything ofr about 2 years and had stopped because she felt that she wanted to try life without it  Has found that over the past couple of months has been having increased issues with panic and anxiety and is requesting to go back on these medications   Has been in counseling but did not feel that this was very helpful          OBJECTIVE  Past Medical History:   Diagnosis Date    Allergic     Anxiety     Depression     Last assessed 6/5/2013    Disease of thyroid gland     GERD (gastroesophageal reflux disease)      temporarily  Wt Readings from Last 3 Encounters:   03/21/19 95 3 kg (210 lb)   09/20/18 88 5 kg (195 lb)   09/13/18 88 5 kg (195 lb)     BP Readings from Last 3 Encounters:   03/21/19 110/62   09/20/18 120/78   09/13/18 118/60     Pulse Readings from Last 3 Encounters:   03/21/19 84   09/20/18 90   09/13/18 80     BMI Readings from Last 3 Encounters:   03/21/19 32 41 kg/m²   09/20/18 30 54 kg/m²   09/13/18 30 54 kg/m²        Physical Exam   Constitutional: She appears well-developed and well-nourished  Psychiatric:   Good eye contact and insight  Good perspective and judgement

## 2019-04-15 ENCOUNTER — TELEPHONE (OUTPATIENT)
Dept: FAMILY MEDICINE CLINIC | Facility: CLINIC | Age: 24
End: 2019-04-15

## 2019-04-16 ENCOUNTER — OFFICE VISIT (OUTPATIENT)
Dept: FAMILY MEDICINE CLINIC | Facility: CLINIC | Age: 24
End: 2019-04-16
Payer: COMMERCIAL

## 2019-04-16 VITALS
RESPIRATION RATE: 12 BRPM | HEART RATE: 78 BPM | SYSTOLIC BLOOD PRESSURE: 116 MMHG | HEIGHT: 67 IN | BODY MASS INDEX: 32.33 KG/M2 | DIASTOLIC BLOOD PRESSURE: 70 MMHG | WEIGHT: 206 LBS

## 2019-04-16 DIAGNOSIS — L50.9 HIVES: Primary | ICD-10-CM

## 2019-04-16 DIAGNOSIS — T78.40XA ALLERGIC REACTION, INITIAL ENCOUNTER: ICD-10-CM

## 2019-04-16 LAB — S PYO AG THROAT QL: NEGATIVE

## 2019-04-16 PROCEDURE — 99213 OFFICE O/P EST LOW 20 MIN: CPT | Performed by: NURSE PRACTITIONER

## 2019-04-16 PROCEDURE — 87880 STREP A ASSAY W/OPTIC: CPT | Performed by: NURSE PRACTITIONER

## 2019-04-16 PROCEDURE — 1036F TOBACCO NON-USER: CPT | Performed by: NURSE PRACTITIONER

## 2019-04-16 PROCEDURE — 3008F BODY MASS INDEX DOCD: CPT | Performed by: NURSE PRACTITIONER

## 2019-04-16 RX ORDER — SPIRONOLACTONE 100 MG/1
100 TABLET, FILM COATED ORAL 2 TIMES DAILY
COMMUNITY
End: 2021-06-14

## 2019-04-16 RX ORDER — PREDNISONE 10 MG/1
TABLET ORAL
Qty: 20 TABLET | Refills: 0 | Status: SHIPPED | OUTPATIENT
Start: 2019-04-16 | End: 2019-12-30 | Stop reason: ALTCHOICE

## 2019-04-17 ENCOUNTER — TELEPHONE (OUTPATIENT)
Dept: FAMILY MEDICINE CLINIC | Facility: CLINIC | Age: 24
End: 2019-04-17

## 2019-12-30 ENCOUNTER — OFFICE VISIT (OUTPATIENT)
Dept: FAMILY MEDICINE CLINIC | Facility: CLINIC | Age: 24
End: 2019-12-30
Payer: COMMERCIAL

## 2019-12-30 VITALS
RESPIRATION RATE: 14 BRPM | DIASTOLIC BLOOD PRESSURE: 82 MMHG | HEIGHT: 67 IN | WEIGHT: 213 LBS | SYSTOLIC BLOOD PRESSURE: 120 MMHG | HEART RATE: 100 BPM | BODY MASS INDEX: 33.43 KG/M2

## 2019-12-30 DIAGNOSIS — T07.XXXA MULTIPLE CONTUSIONS: ICD-10-CM

## 2019-12-30 DIAGNOSIS — J06.9 VIRAL UPPER RESPIRATORY TRACT INFECTION: Primary | ICD-10-CM

## 2019-12-30 DIAGNOSIS — F41.9 ANXIETY: ICD-10-CM

## 2019-12-30 PROCEDURE — 99214 OFFICE O/P EST MOD 30 MIN: CPT | Performed by: NURSE PRACTITIONER

## 2019-12-30 PROCEDURE — 1036F TOBACCO NON-USER: CPT | Performed by: NURSE PRACTITIONER

## 2019-12-30 PROCEDURE — 3008F BODY MASS INDEX DOCD: CPT | Performed by: NURSE PRACTITIONER

## 2019-12-30 RX ORDER — CLONAZEPAM 0.5 MG/1
0.5 TABLET ORAL 2 TIMES DAILY
Qty: 60 TABLET | Refills: 3 | Status: SHIPPED | OUTPATIENT
Start: 2019-12-30 | End: 2020-05-13 | Stop reason: SDUPTHER

## 2019-12-30 RX ORDER — BUSPIRONE HYDROCHLORIDE 15 MG/1
15 TABLET ORAL 2 TIMES DAILY
Qty: 60 TABLET | Refills: 3 | Status: SHIPPED | OUTPATIENT
Start: 2019-12-30 | End: 2020-05-13 | Stop reason: SDUPTHER
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
## 2020-02-13 ENCOUNTER — OFFICE VISIT (OUTPATIENT)
Dept: FAMILY MEDICINE CLINIC | Facility: CLINIC | Age: 25
End: 2020-02-13
Payer: COMMERCIAL

## 2020-02-13 VITALS
SYSTOLIC BLOOD PRESSURE: 118 MMHG | RESPIRATION RATE: 14 BRPM | BODY MASS INDEX: 32.96 KG/M2 | DIASTOLIC BLOOD PRESSURE: 80 MMHG | HEART RATE: 102 BPM | HEIGHT: 67 IN | WEIGHT: 210 LBS

## 2020-02-13 DIAGNOSIS — F32.1 CURRENT MODERATE EPISODE OF MAJOR DEPRESSIVE DISORDER WITHOUT PRIOR EPISODE (HCC): Primary | ICD-10-CM

## 2020-02-13 PROCEDURE — 3008F BODY MASS INDEX DOCD: CPT | Performed by: NURSE PRACTITIONER

## 2020-02-13 PROCEDURE — 1036F TOBACCO NON-USER: CPT | Performed by: NURSE PRACTITIONER

## 2020-02-13 PROCEDURE — 99214 OFFICE O/P EST MOD 30 MIN: CPT | Performed by: NURSE PRACTITIONER

## 2020-02-13 NOTE — PROGRESS NOTES
Assessment/Plan:    1  Current moderate episode of major depressive disorder without prior episode ([address] )  You can stop the buspar if this is not helping but continue the clonapin  We have made a referal for the partial hospitalization program  Please call if you have any difficult accessing this  rto prn pending completion of the program    Subjective:      Patient ID: Manuel Zeng is a 21 y o  female  Since here on 12/30 feels worse  ( see previous note) Anxiety is heightened  Is not sleeping  Having nightmares  Feels like it is not just her anxiety but is feeling depressed  Is avoiidng contact with anyone and forces her self to go to work   Only reason she goes is to make money to pay her pills  When she is there is forgert ful and not focused  Feels that her performance is decreased and is concerned about   Denies any HI but has thoughts of suicidie with no plan   Felt that the Buspar is not helping  After she takes it gets a tingling sensation and feels weak when she is taking it  Is still taking the clonapin  Had her first counseling session ( evaluation) and is trying to schedule follow up appt  Has court date for March 17 ( for [place]) and is concerned that she will loose her licence and then will loose her job  BMI Counseling: Body mass index is 32 89 kg/m²  The BMI is above normal  Nutrition recommendations include reducing portion sizes, decreasing overall calorie intake and 3-5 servings of fruits/vegetables daily  Exercise recommendations include moderate aerobic physical activity for 150 minutes/week      OBJECTIVE  Past Medical History:   Diagnosis Date    Allergic     Anxiety     Depression     Last assessed 6/5/2013    Disease of thyroid gland     GERD (gastroesophageal reflux disease)      temporarily  Wt Readings from Last 3 Encounters:   02/13/20 95 3 kg (210 lb)   12/30/19 96 6 kg (213 lb)   04/16/19 93 4 kg (206 lb)     BP Readings from Last 3 Encounters:   02/13/20 118/80   12/30/19 120/82   04/16/19 116/70     Pulse Readings from Last 3 Encounters:   02/13/20 102   12/30/19 100   04/16/19 78     BMI Readings from Last 3 Encounters:   02/13/20 32 89 kg/m²   12/30/19 33 36 kg/m²   04/16/19 32 75 kg/m²        Physical Exam   Constitutional: She appears well-developed and well-nourished  Psychiatric:   Good eye contact  Teary at times  Consolable  Fair insight and fair judgment

## 2020-05-13 DIAGNOSIS — F41.9 ANXIETY: ICD-10-CM

## 2020-05-14 RX ORDER — BUSPIRONE HYDROCHLORIDE 15 MG/1
15 TABLET ORAL 2 TIMES DAILY
Qty: 60 TABLET | Refills: 0 | Status: SHIPPED | OUTPATIENT
Start: 2020-05-14 | End: 2020-06-09

## 2020-05-14 RX ORDER — CLONAZEPAM 0.5 MG/1
0.5 TABLET ORAL 2 TIMES DAILY
Qty: 60 TABLET | Refills: 0 | Status: SHIPPED | OUTPATIENT
Start: 2020-05-14 | End: 2020-06-16

## 2020-06-09 DIAGNOSIS — F41.9 ANXIETY: ICD-10-CM

## 2020-06-09 RX ORDER — BUSPIRONE HYDROCHLORIDE 15 MG/1
TABLET ORAL
Qty: 60 TABLET | Refills: 0 | Status: SHIPPED | OUTPATIENT
Start: 2020-06-09 | End: 2020-07-22 | Stop reason: SDUPTHER

## 2020-06-13 DIAGNOSIS — F41.9 ANXIETY: ICD-10-CM

## 2020-06-16 RX ORDER — CLONAZEPAM 0.5 MG/1
0.5 TABLET ORAL 2 TIMES DAILY
Qty: 60 TABLET | Refills: 0 | Status: SHIPPED | OUTPATIENT
Start: 2020-06-16 | End: 2020-07-22 | Stop reason: SDUPTHER

## 2020-07-02 ENCOUNTER — TELEPHONE (OUTPATIENT)
Dept: FAMILY MEDICINE CLINIC | Facility: CLINIC | Age: 25
End: 2020-07-02

## 2020-07-22 ENCOUNTER — OFFICE VISIT (OUTPATIENT)
Dept: FAMILY MEDICINE CLINIC | Facility: CLINIC | Age: 25
End: 2020-07-22
Payer: COMMERCIAL

## 2020-07-22 VITALS
HEIGHT: 67 IN | RESPIRATION RATE: 16 BRPM | DIASTOLIC BLOOD PRESSURE: 74 MMHG | SYSTOLIC BLOOD PRESSURE: 120 MMHG | TEMPERATURE: 98.2 F | BODY MASS INDEX: 31.04 KG/M2 | HEART RATE: 97 BPM | WEIGHT: 197.8 LBS | OXYGEN SATURATION: 98 %

## 2020-07-22 DIAGNOSIS — Z11.59 NEED FOR HEPATITIS C SCREENING TEST: Primary | ICD-10-CM

## 2020-07-22 DIAGNOSIS — Z11.4 SCREENING FOR HIV (HUMAN IMMUNODEFICIENCY VIRUS): ICD-10-CM

## 2020-07-22 DIAGNOSIS — Z13.220 SCREENING CHOLESTEROL LEVEL: ICD-10-CM

## 2020-07-22 DIAGNOSIS — F41.9 ANXIETY: ICD-10-CM

## 2020-07-22 DIAGNOSIS — E55.9 VITAMIN D DEFICIENCY: ICD-10-CM

## 2020-07-22 PROCEDURE — 1036F TOBACCO NON-USER: CPT | Performed by: FAMILY MEDICINE

## 2020-07-22 PROCEDURE — 3008F BODY MASS INDEX DOCD: CPT | Performed by: FAMILY MEDICINE

## 2020-07-22 PROCEDURE — 99215 OFFICE O/P EST HI 40 MIN: CPT | Performed by: FAMILY MEDICINE

## 2020-07-22 RX ORDER — BUSPIRONE HYDROCHLORIDE 5 MG/1
TABLET ORAL
Qty: 30 TABLET | Refills: 0 | Status: SHIPPED | OUTPATIENT
Start: 2020-07-22 | End: 2020-08-13

## 2020-07-22 RX ORDER — HYDROXYZINE HYDROCHLORIDE 25 MG/1
25 TABLET, FILM COATED ORAL EVERY 6 HOURS PRN
Qty: 30 TABLET | Refills: 0 | Status: SHIPPED | OUTPATIENT
Start: 2020-07-22 | End: 2020-08-13

## 2020-07-22 RX ORDER — GLUCOSAMINE/D3/BOSWELLIA SERRA 1500MG-400
1 TABLET ORAL DAILY
COMMUNITY
End: 2021-06-14

## 2020-07-22 RX ORDER — DIPHENOXYLATE HYDROCHLORIDE AND ATROPINE SULFATE 2.5; .025 MG/1; MG/1
1 TABLET ORAL DAILY
COMMUNITY
End: 2021-06-14

## 2020-07-22 RX ORDER — CLONAZEPAM 0.5 MG/1
0.5 TABLET ORAL 2 TIMES DAILY
Qty: 60 TABLET | Refills: 0 | Status: SHIPPED | OUTPATIENT
Start: 2020-07-22 | End: 2020-09-24 | Stop reason: SDUPTHER

## 2020-07-22 RX ORDER — VENLAFAXINE HYDROCHLORIDE 37.5 MG/1
CAPSULE, EXTENDED RELEASE ORAL
Qty: 60 CAPSULE | Refills: 0 | Status: SHIPPED | OUTPATIENT
Start: 2020-07-22 | End: 2020-08-14 | Stop reason: SDUPTHER

## 2020-07-22 RX ORDER — SACCHAROMYCES BOULARDII 250 MG
250 CAPSULE ORAL 2 TIMES DAILY
COMMUNITY
End: 2020-11-02 | Stop reason: ALTCHOICE
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
## 2020-07-22 NOTE — PATIENT INSTRUCTIONS
Decrease the buspar to 10 mg twice a day x 5 d, then 5 mg twice a day for five more days  When you start the 5 mg twice a day, then start the effexor  Start effexor at one capsule daily for one week  Then increase to 2 capsules daily  Use hydroxyzine 25 mg as needed for anxiety/sleep  Continue the clonazepam 0 5 mg twice a day  Follow up in 4 weeks  I will reach out to the /care manager to help facilitate an appointment with psychiatry

## 2020-07-23 LAB
25(OH)D3 SERPL-MCNC: 47 NG/ML (ref 30–100)
ALBUMIN SERPL-MCNC: 4.8 G/DL (ref 3.6–5.1)
ALBUMIN/GLOB SERPL: 1.7 (CALC) (ref 1–2.5)
ALP SERPL-CCNC: 42 U/L (ref 31–125)
ALT SERPL-CCNC: 16 U/L (ref 6–29)
AST SERPL-CCNC: 18 U/L (ref 10–30)
BILIRUB SERPL-MCNC: 0.4 MG/DL (ref 0.2–1.2)
BUN SERPL-MCNC: 13 MG/DL (ref 7–25)
BUN/CREAT SERPL: ABNORMAL (CALC) (ref 6–22)
CALCIUM SERPL-MCNC: 11 MG/DL (ref 8.6–10.2)
CHLORIDE SERPL-SCNC: 101 MMOL/L (ref 98–110)
CHOLEST SERPL-MCNC: 133 MG/DL
CHOLEST/HDLC SERPL: 2.4 (CALC)
CO2 SERPL-SCNC: 27 MMOL/L (ref 20–32)
CREAT SERPL-MCNC: 0.77 MG/DL (ref 0.5–1.1)
GLOBULIN SER CALC-MCNC: 2.9 G/DL (CALC) (ref 1.9–3.7)
GLUCOSE SERPL-MCNC: 82 MG/DL (ref 65–99)
HCV AB S/CO SERPL IA: 0.03
HCV AB SERPL QL IA: NORMAL
HDLC SERPL-MCNC: 55 MG/DL
HIV 1+2 AB+HIV1 P24 AG SERPL QL IA: NORMAL
LDLC SERPL CALC-MCNC: 64 MG/DL (CALC)
NONHDLC SERPL-MCNC: 78 MG/DL (CALC)
POTASSIUM SERPL-SCNC: 5 MMOL/L (ref 3.5–5.3)
PROT SERPL-MCNC: 7.7 G/DL (ref 6.1–8.1)
SL AMB EGFR AFRICAN AMERICAN: 124 ML/MIN/1.73M2
SL AMB EGFR NON AFRICAN AMERICAN: 107 ML/MIN/1.73M2
SODIUM SERPL-SCNC: 136 MMOL/L (ref 135–146)
TRIGL SERPL-MCNC: 67 MG/DL
TSH SERPL-ACNC: 1.56 MIU/L

## 2020-07-26 DIAGNOSIS — E83.52 HYPERCALCEMIA: Primary | ICD-10-CM

## 2020-08-12 DIAGNOSIS — F41.9 ANXIETY: ICD-10-CM

## 2020-08-12 RX ORDER — VENLAFAXINE HYDROCHLORIDE 37.5 MG/1
CAPSULE, EXTENDED RELEASE ORAL
Qty: 60 CAPSULE | Refills: 0 | OUTPATIENT
Start: 2020-08-12

## 2020-08-12 NOTE — TELEPHONE ENCOUNTER
Automated refill request received from pharmacy  Please let patient know that we don't take automated refill requests and see if a refill is needed  Thanks

## 2020-08-13 ENCOUNTER — PATIENT MESSAGE (OUTPATIENT)
Dept: FAMILY MEDICINE CLINIC | Facility: CLINIC | Age: 25
End: 2020-08-13

## 2020-08-13 ENCOUNTER — PATIENT OUTREACH (OUTPATIENT)
Dept: CASE MANAGEMENT | Facility: OTHER | Age: 25
End: 2020-08-13

## 2020-08-13 DIAGNOSIS — Z78.9 NEED FOR FOLLOW-UP BY SOCIAL WORKER: Primary | ICD-10-CM

## 2020-08-13 DIAGNOSIS — F41.9 ANXIETY: ICD-10-CM

## 2020-08-13 DIAGNOSIS — G47.00 INSOMNIA, UNSPECIFIED TYPE: Primary | ICD-10-CM

## 2020-08-13 NOTE — TELEPHONE ENCOUNTER
From: Jamia Larry  To: Angelo Deleon MD  Sent: 8/13/2020 8:51 AM EDT  Subject: Non-Urgent Medical Question    Good Morning Dr Joelle Monae,    I know I have my follow up appointment with you on 8/25/20 in regards to my medications  I wanted to reach out in advance in regards to my trouble sleeping still  I'm restless and can't sleep through the night  I'm waking up multiple times a night and sometimes they are panic induced  The hydroxyzine doesn't seem to be helping  I just wanted to give you this feedback  I don't know if there's anything you would want to do now or just wait until my appointment  Please advise  Thank you!     Chelly Briggs

## 2020-08-14 ENCOUNTER — PATIENT OUTREACH (OUTPATIENT)
Dept: CASE MANAGEMENT | Facility: OTHER | Age: 25
End: 2020-08-14

## 2020-08-14 ENCOUNTER — TELEPHONE (OUTPATIENT)
Dept: PSYCHIATRY | Facility: CLINIC | Age: 25
End: 2020-08-14

## 2020-08-14 RX ORDER — VENLAFAXINE HYDROCHLORIDE 75 MG/1
CAPSULE, EXTENDED RELEASE ORAL
Qty: 30 CAPSULE | Refills: 1 | Status: SHIPPED | OUTPATIENT
Start: 2020-08-14 | End: 2020-09-24 | Stop reason: SDUPTHER

## 2020-08-14 RX ORDER — TRAZODONE HYDROCHLORIDE 50 MG/1
50 TABLET ORAL
Qty: 30 TABLET | Refills: 1 | Status: SHIPPED | OUTPATIENT
Start: 2020-08-14 | End: 2020-09-24 | Stop reason: SDUPTHER

## 2020-08-14 NOTE — TELEPHONE ENCOUNTER
From: Anais Harman  To: Sundar Go MD  Sent: 8/13/2020 11:37 PM EDT  Subject: Non-Urgent Medical Question    I am fully off of the buspar and on the full dosage of the Effexor  I definitely felt some strong side effects coming off the buspar but they have since subsided  The Effexor so far I think is working well, I feel like my moods are more stabilized  I have never taken trazodone  I would be interested in trying it if you think it's a good option to help with sleep        ----- Message -----   From:Meenu Phoenix MD   Sent:8/13/2020 5:36 PM EDT   Nikki Mcguire   Subject:RE: Non-Urgent Medical Question    Joao Flynn,   How are you doing with tapering off buspar and onto effexor? Let's stop the hydroxyzine since it isn't helping  Have you taken trazodone before for sleep? This may be a good option to help with sleep and you could use it every night  If you'd like, we can start trazodone 50 mg once every night for sleep  Matt Holley MD      ----- Message -----   Luis Duvall   Sent:8/13/2020 8:51 AM EDT   To:Meenu Phoenix MD   Subject:Non-Urgent Medical Question    Good Morning Dr Vandana Holley,    I know I have my follow up appointment with you on 8/25/20 in regards to my medications  I wanted to reach out in advance in regards to my trouble sleeping still  I'm restless and can't sleep through the night  I'm waking up multiple times a night and sometimes they are panic induced  The hydroxyzine doesn't seem to be helping  I just wanted to give you this feedback  I don't know if there's anything you would want to do now or just wait until my appointment  Please advise  Thank you!     Manjula Powers

## 2020-08-14 NOTE — TELEPHONE ENCOUNTER
----- Message from Eliza Love sent at 8/14/2020  2:18 PM EDT -----  Hello,  Please contact this patient with an appointment with Psychiatry  It looks like there was an order for the partial program in February  Patient reports not being able to get an appointment    Thank you

## 2020-08-14 NOTE — TELEPHONE ENCOUNTER
Patient has a referral for [facility] partial program and would like to schedule an apt can you please give her a call thank you

## 2020-08-14 NOTE — PROGRESS NOTES
OPCM,  placed a telephone call to patient in response to consult from her PCP for Mental Health f/u  Patient reports not being able to obtain an appointment with Psychiatry  Telephone call placed to patient and message left on her VM with my contact information  Telephone call placed to [facility] and message left regarding contacting patient with an appointment  Message also sent via in basket to the intake [facility] pool

## 2020-08-24 ENCOUNTER — PATIENT OUTREACH (OUTPATIENT)
Dept: CASE MANAGEMENT | Facility: OTHER | Age: 25
End: 2020-08-24

## 2020-08-24 ENCOUNTER — TELEPHONE (OUTPATIENT)
Dept: PSYCHIATRY | Facility: CLINIC | Age: 25
End: 2020-08-24

## 2020-08-24 NOTE — PROGRESS NOTES
Telephone call placed to patient today to provide f/u communication  Message left for patient on her VM requesting that she contact me if she still requires further assistance with mental health f/u  Telephone call also placed to [address] [place] [address] and message left on VM requesting a return call  Message sent to Bobby Dobbins [facility] Supervisor requesting his assistance with getting patient an appointment

## 2020-09-01 ENCOUNTER — TELEPHONE (OUTPATIENT)
Dept: PSYCHIATRY | Facility: CLINIC | Age: 25
End: 2020-09-01

## 2020-09-24 DIAGNOSIS — F41.9 ANXIETY: ICD-10-CM

## 2020-09-24 RX ORDER — CLONAZEPAM 0.5 MG/1
TABLET ORAL
Qty: 60 TABLET | Refills: 0 | OUTPATIENT
Start: 2020-09-24

## 2020-10-11 DIAGNOSIS — F41.9 ANXIETY: ICD-10-CM

## 2020-10-11 RX ORDER — VENLAFAXINE HYDROCHLORIDE 75 MG/1
CAPSULE, EXTENDED RELEASE ORAL
Qty: 30 CAPSULE | Refills: 1 | OUTPATIENT
Start: 2020-10-11

## 2020-10-26 ENCOUNTER — PATIENT MESSAGE (OUTPATIENT)
Dept: FAMILY MEDICINE CLINIC | Facility: CLINIC | Age: 25
End: 2020-10-26

## 2020-10-26 DIAGNOSIS — F41.9 ANXIETY: ICD-10-CM

## 2020-10-26 DIAGNOSIS — G47.00 INSOMNIA, UNSPECIFIED TYPE: ICD-10-CM

## 2020-10-26 RX ORDER — VENLAFAXINE HYDROCHLORIDE 75 MG/1
150 CAPSULE, EXTENDED RELEASE ORAL DAILY
Qty: 60 CAPSULE | Refills: 0 | Status: CANCELLED | OUTPATIENT
Start: 2020-10-26

## 2020-10-27 RX ORDER — TRAZODONE HYDROCHLORIDE 50 MG/1
50 TABLET ORAL
Qty: 30 TABLET | Refills: 0 | Status: SHIPPED | OUTPATIENT
Start: 2020-10-27 | End: 2020-11-02 | Stop reason: SDUPTHER

## 2020-10-27 RX ORDER — VENLAFAXINE HYDROCHLORIDE 150 MG/1
150 CAPSULE, EXTENDED RELEASE ORAL
Qty: 30 CAPSULE | Refills: 0 | Status: SHIPPED | OUTPATIENT
Start: 2020-10-27 | End: 2020-11-02 | Stop reason: SDUPTHER

## 2020-10-27 RX ORDER — CLONAZEPAM 0.5 MG/1
0.5 TABLET ORAL 2 TIMES DAILY
Qty: 60 TABLET | Refills: 0 | Status: SHIPPED | OUTPATIENT
Start: 2020-10-27 | End: 2020-11-02 | Stop reason: SDUPTHER

## 2020-11-02 ENCOUNTER — TELEMEDICINE (OUTPATIENT)
Dept: FAMILY MEDICINE CLINIC | Facility: CLINIC | Age: 25
End: 2020-11-02
Payer: COMMERCIAL

## 2020-11-02 VITALS — HEIGHT: 67 IN | TEMPERATURE: 96.2 F | HEART RATE: 56 BPM | WEIGHT: 203 LBS | BODY MASS INDEX: 31.86 KG/M2

## 2020-11-02 DIAGNOSIS — F41.9 ANXIETY: Primary | ICD-10-CM

## 2020-11-02 DIAGNOSIS — R05.9 COUGH: ICD-10-CM

## 2020-11-02 DIAGNOSIS — G47.00 INSOMNIA, UNSPECIFIED TYPE: ICD-10-CM

## 2020-11-02 PROCEDURE — 1036F TOBACCO NON-USER: CPT | Performed by: FAMILY MEDICINE

## 2020-11-02 PROCEDURE — 99214 OFFICE O/P EST MOD 30 MIN: CPT | Performed by: FAMILY MEDICINE

## 2020-11-02 PROCEDURE — 3008F BODY MASS INDEX DOCD: CPT | Performed by: FAMILY MEDICINE

## 2020-11-02 PROCEDURE — U0003 INFECTIOUS AGENT DETECTION BY NUCLEIC ACID (DNA OR RNA); SEVERE ACUTE RESPIRATORY SYNDROME CORONAVIRUS 2 (SARS-COV-2) (CORONAVIRUS DISEASE [COVID-19]), AMPLIFIED PROBE TECHNIQUE, MAKING USE OF HIGH THROUGHPUT TECHNOLOGIES AS DESCRIBED BY CMS-2020-01-R: HCPCS | Performed by: FAMILY MEDICINE

## 2020-11-02 RX ORDER — CLONAZEPAM 0.5 MG/1
0.5 TABLET ORAL 2 TIMES DAILY
Qty: 60 TABLET | Refills: 2 | Status: SHIPPED | OUTPATIENT
Start: 2020-11-02 | End: 2021-02-01 | Stop reason: SDUPTHER

## 2020-11-02 RX ORDER — VENLAFAXINE HYDROCHLORIDE 75 MG/1
75 CAPSULE, EXTENDED RELEASE ORAL
Qty: 90 CAPSULE | Refills: 1 | Status: SHIPPED | OUTPATIENT
Start: 2020-11-02 | End: 2021-02-01 | Stop reason: SDUPTHER

## 2020-11-02 RX ORDER — TRAZODONE HYDROCHLORIDE 100 MG/1
100 TABLET ORAL
Qty: 90 TABLET | Refills: 1 | Status: SHIPPED | OUTPATIENT
Start: 2020-11-02 | End: 2021-04-12 | Stop reason: SDUPTHER

## 2020-11-04 LAB — SARS-COV-2 RNA SPEC QL NAA+PROBE: NOT DETECTED

## 2020-11-09 ENCOUNTER — PATIENT MESSAGE (OUTPATIENT)
Dept: FAMILY MEDICINE CLINIC | Facility: CLINIC | Age: 25
End: 2020-11-09

## 2020-11-09 DIAGNOSIS — R05.9 COUGH: Primary | ICD-10-CM

## 2020-11-10 RX ORDER — BENZONATATE 200 MG/1
200 CAPSULE ORAL 3 TIMES DAILY PRN
Qty: 20 CAPSULE | Refills: 0 | Status: SHIPPED | OUTPATIENT
Start: 2020-11-10 | End: 2020-12-03

## 2020-12-01 ENCOUNTER — TELEMEDICINE (OUTPATIENT)
Dept: FAMILY MEDICINE CLINIC | Facility: CLINIC | Age: 25
End: 2020-12-01
Payer: COMMERCIAL

## 2020-12-01 DIAGNOSIS — B34.9 VIRAL INFECTION, UNSPECIFIED: Primary | ICD-10-CM

## 2020-12-01 PROCEDURE — 1036F TOBACCO NON-USER: CPT | Performed by: FAMILY MEDICINE

## 2020-12-01 PROCEDURE — 99214 OFFICE O/P EST MOD 30 MIN: CPT | Performed by: FAMILY MEDICINE

## 2020-12-01 RX ORDER — ALBUTEROL SULFATE 90 UG/1
2 AEROSOL, METERED RESPIRATORY (INHALATION) EVERY 4 HOURS PRN
COMMUNITY
End: 2021-06-14

## 2020-12-02 DIAGNOSIS — B34.9 VIRAL INFECTION, UNSPECIFIED: ICD-10-CM

## 2020-12-02 PROCEDURE — U0003 INFECTIOUS AGENT DETECTION BY NUCLEIC ACID (DNA OR RNA); SEVERE ACUTE RESPIRATORY SYNDROME CORONAVIRUS 2 (SARS-COV-2) (CORONAVIRUS DISEASE [COVID-19]), AMPLIFIED PROBE TECHNIQUE, MAKING USE OF HIGH THROUGHPUT TECHNOLOGIES AS DESCRIBED BY CMS-2020-01-R: HCPCS | Performed by: FAMILY MEDICINE

## 2020-12-03 ENCOUNTER — PATIENT MESSAGE (OUTPATIENT)
Dept: FAMILY MEDICINE CLINIC | Facility: CLINIC | Age: 25
End: 2020-12-03

## 2020-12-03 DIAGNOSIS — J01.90 ACUTE NON-RECURRENT SINUSITIS, UNSPECIFIED LOCATION: ICD-10-CM

## 2020-12-03 DIAGNOSIS — B34.9 VIRAL INFECTION, UNSPECIFIED: Primary | ICD-10-CM

## 2020-12-03 LAB — SARS-COV-2 RNA SPEC QL NAA+PROBE: NOT DETECTED

## 2020-12-03 RX ORDER — AMOXICILLIN AND CLAVULANATE POTASSIUM 875; 125 MG/1; MG/1
1 TABLET, FILM COATED ORAL EVERY 12 HOURS SCHEDULED
Qty: 14 TABLET | Refills: 0 | Status: SHIPPED | OUTPATIENT
Start: 2020-12-03 | End: 2020-12-10

## 2020-12-03 RX ORDER — BENZONATATE 200 MG/1
200 CAPSULE ORAL 3 TIMES DAILY PRN
Qty: 20 CAPSULE | Refills: 0 | Status: SHIPPED | OUTPATIENT
Start: 2020-12-03 | End: 2021-04-12 | Stop reason: ALTCHOICE

## 2020-12-06 ENCOUNTER — PATIENT MESSAGE (OUTPATIENT)
Dept: FAMILY MEDICINE CLINIC | Facility: CLINIC | Age: 25
End: 2020-12-06

## 2020-12-07 ENCOUNTER — PATIENT MESSAGE (OUTPATIENT)
Dept: FAMILY MEDICINE CLINIC | Facility: CLINIC | Age: 25
End: 2020-12-07

## 2020-12-07 ENCOUNTER — TELEPHONE (OUTPATIENT)
Dept: FAMILY MEDICINE CLINIC | Facility: CLINIC | Age: 25
End: 2020-12-07

## 2020-12-14 ENCOUNTER — PATIENT MESSAGE (OUTPATIENT)
Dept: FAMILY MEDICINE CLINIC | Facility: CLINIC | Age: 25
End: 2020-12-14

## 2020-12-14 DIAGNOSIS — R05.9 COUGH: Primary | ICD-10-CM

## 2020-12-14 RX ORDER — DEXTROMETHORPHAN HYDROBROMIDE AND PROMETHAZINE HYDROCHLORIDE 15; 6.25 MG/5ML; MG/5ML
5 SOLUTION ORAL EVERY 8 HOURS PRN
Qty: 120 ML | Refills: 0 | Status: SHIPPED | OUTPATIENT
Start: 2020-12-14 | End: 2021-02-01 | Stop reason: ALTCHOICE

## 2021-02-01 ENCOUNTER — TELEMEDICINE (OUTPATIENT)
Dept: FAMILY MEDICINE CLINIC | Facility: CLINIC | Age: 26
End: 2021-02-01
Payer: COMMERCIAL

## 2021-02-01 VITALS — WEIGHT: 203 LBS | BODY MASS INDEX: 31.86 KG/M2 | HEIGHT: 67 IN

## 2021-02-01 DIAGNOSIS — F41.9 ANXIETY: ICD-10-CM

## 2021-02-01 PROCEDURE — 1036F TOBACCO NON-USER: CPT | Performed by: FAMILY MEDICINE

## 2021-02-01 PROCEDURE — 3008F BODY MASS INDEX DOCD: CPT | Performed by: FAMILY MEDICINE

## 2021-02-01 PROCEDURE — 3725F SCREEN DEPRESSION PERFORMED: CPT | Performed by: FAMILY MEDICINE

## 2021-02-01 PROCEDURE — 99214 OFFICE O/P EST MOD 30 MIN: CPT | Performed by: FAMILY MEDICINE

## 2021-02-01 RX ORDER — CLONAZEPAM 0.5 MG/1
0.5 TABLET ORAL 2 TIMES DAILY PRN
Qty: 40 TABLET | Refills: 0 | Status: SHIPPED | OUTPATIENT
Start: 2021-02-01 | End: 2021-04-12 | Stop reason: SDUPTHER

## 2021-02-01 RX ORDER — TRAZODONE HYDROCHLORIDE 100 MG/1
TABLET ORAL
COMMUNITY
Start: 2021-01-18 | End: 2021-02-01 | Stop reason: ALTCHOICE

## 2021-02-01 RX ORDER — VENLAFAXINE HYDROCHLORIDE 150 MG/1
150 CAPSULE, EXTENDED RELEASE ORAL
Qty: 90 CAPSULE | Refills: 1 | Status: SHIPPED | OUTPATIENT
Start: 2021-02-01 | End: 2021-06-10

## 2021-02-01 NOTE — PATIENT INSTRUCTIONS
Increase effexor xr to 150 mg daily  Continue trazodone 100 mg nightly  Continue clonazepam as needed and continue seeing your therapist   Follow up on 3/15/21 at 9 AM (virtual visit) and contact me sooner if you feel worse or have any changes        Here is a web site to go to for information on providers who certify medical marijuana:  https://www ClickFox/    Specific medical marijuana physician-   Steven Bullock MD  [address] [place] [address], [address]

## 2021-02-01 NOTE — PROGRESS NOTES
Virtual Regular Visit      Assessment/Plan:    Problem List Items Addressed This Visit        Other    Anxiety      worsening anxiety control  Will increase effexor xr dose to 150 mg daily  Pt would like to look into medical marijuana as an option- resource provided as below  Patient Instructions   Increase effexor xr to 150 mg daily  Continue trazodone 100 mg nightly  Continue clonazepam as needed and continue seeing your therapist   Follow up on 3/15/21 at 9 AM (virtual visit) and contact me sooner if you feel worse or have any changes  Here is a web site to go to for information on providers who certify medical marijuana:  https://www [employer]fluence/    Specific medical marijuana physician-   Matti MD Blanche  [address] [facility],  [facility], [address] [place] [address]               Reason for visit is   Chief Complaint   Patient presents with    Anxiety     follow up     Virtual Regular Visit    Virtual Regular Visit        Encounter provider Ryan Marin MD    Provider located at [address] [place] [address] [address]  [address] [facility] [address] [phone]      Recent Visits  No visits were found meeting these conditions  Showing recent visits within past 7 days and meeting all other requirements     Today's Visits  Date Type Provider Dept   02/01/21 Telemedicine Ryan Marin MD [facility] today's visits and meeting all other requirements     Future Appointments  No visits were found meeting these conditions  Showing future appointments within next 150 days and meeting all other requirements        The patient was identified by name and date of birth  Taylor Murcia was informed that this is a telemedicine visit and that the visit is being conducted through [facility] [place] [place] and patient was informed that this is a secure, HIPAA-compliant platform  She agrees to proceed     My office door was closed  No one else was in the room  She acknowledged consent and understanding of privacy and security of the video platform  The patient has agreed to participate and understands they can discontinue the visit at any time  Patient is aware this is a billable service  Subjective  Deananighat Valle is a 31 y o  female following up on anxiety and insomnia  She notes her anxiety is increased  Sleep is not great  Having panic attacks  She isn't sure why- no clear trigger  Doesn't feel depressed, no thoughts of self-harm  She wonders about medical marijuana  Trouble falling asleep b/c of anxiety, restless  Hard to fall back to sleep after she wakes  Weird dreams- vivid, but doesn't remember them in the AM     Seeing therapist weekly  PHQ-9 Depression Screening    PHQ-9:   Frequency of the following problems over the past two weeks:      Little interest or pleasure in doing things: 1 - several days  Feeling down, depressed, or hopeless: 1 - several days  Trouble falling or staying asleep, or sleeping too much: 3 - nearly every day  Feeling tired or having little energy: 3 - nearly every day  Poor appetite or overeatin - not at all  Feeling bad about yourself - or that you are a failure or have let yourself or your family down: 0 - not at all  Trouble concentrating on things, such as reading the newspaper or watching television: 1 - several days  Moving or speaking so slowly that other people could have noticed  Or the opposite - being so fidgety or restless that you have been moving around a lot more than usual: 0 - not at all  Thoughts that you would be better off dead, or of hurting yourself in some way: 0 - not at all  PHQ-2 Score: 2  PHQ-9 Score: 9           BRANDY-7 Flowsheet Screening      Most Recent Value   Over the last two weeks, how often have you been bothered by the following problems?     Feeling nervous, anxious, or on edge  3   Not being able to stop or control worrying  3 Worrying too much about different things  1   Trouble relaxing   3   Being so restless that it's hard to sit still  1   Becoming easily annoyed or irritable   1   Feeling afraid as if something awful might happen  0   How difficult have these problems made it for you to do your work, take care of things at home, or get along with other people?    Not difficult at all   BRANDY Score   12              HPI     Past Medical History:   Diagnosis Date    Allergic     Anxiety     Depression     Last assessed 6/5/2013    Disease of thyroid gland     GERD (gastroesophageal reflux disease)        Past Surgical History:   Procedure Laterality Date    CHOLECYSTECTOMY      CHOLECYSTECTOMY LAPAROSCOPIC N/A 5/18/2018    Procedure: CHOLECYSTECTOMY LAPAROSCOPIC;  Surgeon: Dahiana Henson MD;  Location:  MAIN OR;  Service: General    GALLBLADDER SURGERY  05/29/2018    TONSILLECTOMY      With Adenoidectomy       Current Outpatient Medications   Medication Sig Dispense Refill    albuterol (PROVENTIL HFA,VENTOLIN HFA) 90 mcg/act inhaler Inhale 2 puffs every 4 (four) hours as needed for wheezing      benzonatate (TESSALON) 200 MG capsule Take 1 capsule (200 mg total) by mouth 3 (three) times a day as needed for cough 20 capsule 0    Biotin 02911 MCG TABS Take 1 tablet by mouth daily      clonazePAM (KlonoPIN) 0 5 mg tablet Take 1 tablet (0 5 mg total) by mouth 2 (two) times a day 60 tablet 2    multivitamin (THERAGRAN) TABS Take 1 tablet by mouth daily      Norethin Ace-Eth Estrad-FE (TOOTIE 24 FE PO) Tootie 24 Fe 1 mg-20 mcg (24)/75 mg (4) tablet   TAKE 1 TABLET BY MOUTH EVERY DAY AS DIRECTED      spironolactone (ALDACTONE) 100 mg tablet Take 100 mg by mouth 2 (two) times a day       traZODone (DESYREL) 100 mg tablet Take 1 tablet (100 mg total) by mouth daily at bedtime 90 tablet 1    venlafaxine (EFFEXOR-XR) 75 mg 24 hr capsule Take 1 capsule (75 mg total) by mouth daily with breakfast 90 capsule 1     No current facility-administered medications for this visit  Allergies   Allergen Reactions    Other Allergic Rhinitis     Category: Seasonal allergies; Review of Systems    Video Exam    Vitals:    02/01/21 0849   Weight: 92 1 kg (203 lb)   Height: 5' 7" (1 702 m)       Physical Exam  Vitals signs and nursing note reviewed  Constitutional:       Appearance: She is well-developed  HENT:      Head: Normocephalic and atraumatic  Eyes:      Conjunctiva/sclera: Conjunctivae normal    Pulmonary:      Effort: Pulmonary effort is normal  No respiratory distress  Breath sounds: No stridor  Skin:     Findings: No rash  Neurological:      Mental Status: She is alert and oriented to person, place, and time  Psychiatric:         Behavior: Behavior normal       Comments: anxious          I spent 20 minutes directly with the patient during this visit      VIRTUAL VISIT DISCLAIMER    Cate Girard acknowledges that she has consented to an online visit or consultation  She understands that the online visit is based solely on information provided by her, and that, in the absence of a face-to-face physical evaluation by the physician, the diagnosis she receives is both limited and provisional in terms of accuracy and completeness  This is not intended to replace a full medical face-to-face evaluation by the physician  Cate Girard understands and accepts these terms

## 2021-03-11 ENCOUNTER — TELEPHONE (OUTPATIENT)
Dept: ADMINISTRATIVE | Facility: OTHER | Age: 26
End: 2021-03-11

## 2021-03-11 NOTE — TELEPHONE ENCOUNTER
Upon review of the In Basket request we were able to locate, review, and update the patient chart as requested for Pap Smear (HPV) aka Cervical Cancer Screening  Any additional questions or concerns should be emailed to the Practice Liaisons via [email or web address] email, please do not reply via In Basket      Thank you  Alicia Burr MA

## 2021-03-11 NOTE — TELEPHONE ENCOUNTER
----- Message from Cecille Henriquez sent at 3/10/2021  3:08 PM EST -----  Regarding: [facility] FP  03/10/21 3:08 PM    Hello, our patient Liyah Reece has had Pap Smear (HPV) aka Cervical Cancer Screening completed/performed  Please assist in updating the patient chart by making an External outreach to [address] [place] [address]  [email or web address]  [address] , [place], [address] [facility]  [phone] facility     The date of service is 3/3/21    Thank you,  Alise Sanchez, MA  [facility]

## 2021-03-11 NOTE — TELEPHONE ENCOUNTER
----- Message from Cecille Lu sent at 3/10/2021  3:08 PM EST -----  Regarding: MAYRA FP  03/10/21 3:08 PM    Hello, our patient Bobbe Lombard has had Pap Smear (HPV) aka Cervical Cancer Screening completed/performed  Please assist in updating the patient chart by making an External outreach to [address] [place] [address]  [address] , [facility], [address] [facility]  [phone] facility     The date of service is 3/3/21    Thank you,  Stoney Huber MA  [facility]

## 2021-03-11 NOTE — TELEPHONE ENCOUNTER
Upon review of the In Basket request and the patient's chart, initial outreach has been made via telephone call, please see Contacts section for details        Spoke to Giovanna Martinez , will fax right over    Thank you  Cecille Carrillo

## 2021-04-12 ENCOUNTER — TELEMEDICINE (OUTPATIENT)
Dept: FAMILY MEDICINE CLINIC | Facility: CLINIC | Age: 26
End: 2021-04-12
Payer: COMMERCIAL

## 2021-04-12 VITALS — TEMPERATURE: 98.4 F | WEIGHT: 210 LBS | BODY MASS INDEX: 33.75 KG/M2 | HEIGHT: 66 IN

## 2021-04-12 DIAGNOSIS — F41.9 ANXIETY: ICD-10-CM

## 2021-04-12 DIAGNOSIS — G47.00 INSOMNIA, UNSPECIFIED TYPE: ICD-10-CM

## 2021-04-12 PROCEDURE — 3008F BODY MASS INDEX DOCD: CPT | Performed by: FAMILY MEDICINE

## 2021-04-12 PROCEDURE — 99214 OFFICE O/P EST MOD 30 MIN: CPT | Performed by: FAMILY MEDICINE

## 2021-04-12 PROCEDURE — 1036F TOBACCO NON-USER: CPT | Performed by: FAMILY MEDICINE

## 2021-04-12 RX ORDER — VENLAFAXINE HYDROCHLORIDE 37.5 MG/1
37.5 CAPSULE, EXTENDED RELEASE ORAL
Qty: 30 CAPSULE | Refills: 2 | Status: SHIPPED | OUTPATIENT
Start: 2021-04-12 | End: 2021-06-10

## 2021-04-12 RX ORDER — TRAZODONE HYDROCHLORIDE 100 MG/1
100 TABLET ORAL
Qty: 90 TABLET | Refills: 1 | Status: SHIPPED | OUTPATIENT
Start: 2021-04-12 | End: 2021-06-14

## 2021-04-12 RX ORDER — CLONAZEPAM 0.5 MG/1
0.5 TABLET ORAL 2 TIMES DAILY PRN
Qty: 60 TABLET | Refills: 0 | Status: SHIPPED | OUTPATIENT
Start: 2021-04-12 | End: 2021-05-10 | Stop reason: SDUPTHER

## 2021-04-12 NOTE — PROGRESS NOTES
Virtual Regular Visit      Assessment/Plan:    Problem List Items Addressed This Visit        Other    Anxiety     Increase effexor xr to 150 mg plus 37 5 mg for total 187 5 mg daily  She will continue on trazodone for sleep  Refilled clonazepam for prn use  She is looking for a new job and is hopeful that getting out of the enviroment that is causing stress will help  F/U in one month  Relevant Medications    venlafaxine (EFFEXOR-XR) 37 5 mg 24 hr capsule    clonazePAM (KlonoPIN) 0 5 mg tablet    Insomnia    Relevant Medications    traZODone (DESYREL) 100 mg tablet             Reason for visit is   Chief Complaint   Patient presents with    Virtual Regular Visit        Encounter provider Christine Abebe MD    Provider located at [address] [place] [address],[address]  [address]  [address] [facility] [address] [phone]      Recent Visits  No visits were found meeting these conditions  Showing recent visits within past 7 days and meeting all other requirements     Today's Visits  Date Type Provider Dept   04/12/21 Telemedicine Christine Abebe MD Pg  [address] [facility] today's visits and meeting all other requirements     Future Appointments  No visits were found meeting these conditions  Showing future appointments within next 150 days and meeting all other requirements        The patient was identified by name and date of birth  Armen Mccullough was informed that this is a telemedicine visit and that the visit is being conducted through [facility] and patient was informed that this is a secure, HIPAA-compliant platform  She agrees to proceed     My office door was closed  No one else was in the room  She acknowledged consent and understanding of privacy and security of the video platform  The patient has agreed to participate and understands they can discontinue the visit at any time  Patient is aware this is a billable service       Subjective  Armen Mccullough is a 31 y o  female following up on anxiety and sleep  She says she is not doing well  A lot of increased stress  Stress w work in particular  Looking at other jobs  Has been in present role for 4 years  She is at times oversleeping, at times not sleeping  She notices the past two weeks she has been feeling worse  Ran out of clonazepam several weeks ago  She feels panicky, edgy    Sleeplessness is making things more difficult  Is taking effexor xr 150 mg daily regularly as well as the trazodone 100 mg at night    No negative side effects of these      HPI     Past Medical History:   Diagnosis Date    Allergic     Anxiety     Depression     Last assessed 6/5/2013    Disease of thyroid gland     GERD (gastroesophageal reflux disease)        Past Surgical History:   Procedure Laterality Date    CHOLECYSTECTOMY      CHOLECYSTECTOMY LAPAROSCOPIC N/A 5/18/2018    Procedure: CHOLECYSTECTOMY LAPAROSCOPIC;  Surgeon: Allen Villeda MD;  Location: [facility] OR;  Service: General    GALLBLADDER SURGERY  05/29/2018    TONSILLECTOMY      With Adenoidectomy       Current Outpatient Medications   Medication Sig Dispense Refill    albuterol (PROVENTIL HFA,VENTOLIN HFA) 90 mcg/act inhaler Inhale 2 puffs every 4 (four) hours as needed for wheezing      Biotin 13538 MCG TABS Take 1 tablet by mouth daily      clonazePAM (KlonoPIN) 0 5 mg tablet Take 1 tablet (0 5 mg total) by mouth 2 (two) times a day as needed for anxiety 40 tablet 0    multivitamin (THERAGRAN) TABS Take 1 tablet by mouth daily      Norethin Ace-Eth Estrad-FE (TOOTIE 24 FE PO) Tootie 24 Fe 1 mg-20 mcg (24)/75 mg (4) tablet   TAKE 1 TABLET BY MOUTH EVERY DAY AS DIRECTED      spironolactone (ALDACTONE) 100 mg tablet Take 100 mg by mouth 2 (two) times a day       traZODone (DESYREL) 100 mg tablet Take 1 tablet (100 mg total) by mouth daily at bedtime 90 tablet 1    venlafaxine (EFFEXOR-XR) 150 mg 24 hr capsule Take 1 capsule (150 mg total) by mouth daily with breakfast 90 capsule 1     No current facility-administered medications for this visit  Allergies   Allergen Reactions    Other Allergic Rhinitis     Category: Seasonal allergies; Review of Systems    Video Exam    Vitals:    04/12/21 1428   Temp: 98 4 °F (36 9 °C)   Weight: 95 3 kg (210 lb)   Height: 5' 6" (1 676 m)       Physical Exam  Vitals signs and nursing note reviewed  Constitutional:       General: She is not in acute distress  Appearance: Normal appearance  HENT:      Head: Normocephalic and atraumatic  Mouth/Throat:      Mouth: Mucous membranes are moist    Eyes:      Conjunctiva/sclera: Conjunctivae normal    Pulmonary:      Effort: Pulmonary effort is normal  No respiratory distress  Breath sounds: No stridor  Neurological:      Mental Status: She is alert and oriented to person, place, and time  Psychiatric:         Behavior: Behavior normal       Comments: Anxious            I spent 25 minutes directly with the patient during this visit      VIRTUAL VISIT DISCLAIMER    Ninfa Saba acknowledges that she has consented to an online visit or consultation  She understands that the online visit is based solely on information provided by her, and that, in the absence of a face-to-face physical evaluation by the physician, the diagnosis she receives is both limited and provisional in terms of accuracy and completeness  This is not intended to replace a full medical face-to-face evaluation by the physician  Ninfa Saba understands and accepts these terms

## 2021-04-12 NOTE — ASSESSMENT & PLAN NOTE
Increase effexor xr to 150 mg plus 37 5 mg for total 187 5 mg daily  She will continue on trazodone for sleep  Refilled clonazepam for prn use  She is looking for a new job and is hopeful that getting out of the enviroment that is causing stress will help  F/U in one month

## 2021-04-29 DIAGNOSIS — F41.9 ANXIETY: ICD-10-CM

## 2021-04-29 RX ORDER — VENLAFAXINE HYDROCHLORIDE 75 MG/1
75 CAPSULE, EXTENDED RELEASE ORAL
Qty: 90 CAPSULE | Refills: 1 | OUTPATIENT
Start: 2021-04-29

## 2021-05-10 ENCOUNTER — TELEPHONE (OUTPATIENT)
Dept: FAMILY MEDICINE CLINIC | Facility: CLINIC | Age: 26
End: 2021-05-10

## 2021-05-10 ENCOUNTER — TELEMEDICINE (OUTPATIENT)
Dept: FAMILY MEDICINE CLINIC | Facility: CLINIC | Age: 26
End: 2021-05-10
Payer: COMMERCIAL

## 2021-05-10 VITALS — TEMPERATURE: 98.6 F | HEIGHT: 66 IN | WEIGHT: 210 LBS | BODY MASS INDEX: 33.75 KG/M2

## 2021-05-10 DIAGNOSIS — F41.9 ANXIETY: Primary | ICD-10-CM

## 2021-05-10 PROCEDURE — 99214 OFFICE O/P EST MOD 30 MIN: CPT | Performed by: FAMILY MEDICINE

## 2021-05-10 RX ORDER — CLONAZEPAM 0.5 MG/1
.5-1 TABLET ORAL 2 TIMES DAILY PRN
Qty: 75 TABLET | Refills: 0 | Status: SHIPPED | OUTPATIENT
Start: 2021-05-10 | End: 2021-06-10 | Stop reason: SDUPTHER

## 2021-05-10 NOTE — ASSESSMENT & PLAN NOTE
Continue on trazodone 100 mg daily  She will schedule appointment with therapist and will adjust her anxiety medication as noted

## 2021-05-10 NOTE — PATIENT INSTRUCTIONS
Decrease the effexor xr to 150 mg daily x 2 weeks  Then decrease to 75 mg daily x 2 weeks  We will plan to add on prozac as you taper further off effexor xr  Continue trazodone for sleep  Ok to increase clonazepam to 0 5 mg twice a day, with additional dose as needed

## 2021-05-10 NOTE — TELEPHONE ENCOUNTER
L/M for patient to call back for the following appointments:  Make appointment with Margarito Au for counseling  Follow up w/ me in 4 weeks for mood  Please continue outreach  Thank you

## 2021-05-10 NOTE — PROGRESS NOTES
Virtual Regular Visit      Assessment/Plan:    Problem List Items Addressed This Visit        Other    Anxiety - Primary     Decrease the effexor xr to 150 mg daily x 2 weeks  Then decrease to 75 mg daily x 2 weeks  Patient Instructions   Decrease the effexor xr to 150 mg daily x 2 weeks  Then decrease to 75 mg daily x 2 weeks  We will plan to add on prozac as you taper further off effexor xr  Continue trazodone for sleep  Ok to increase clonazepam to 0 5 mg twice a day, with additional dose as needed  BMI Counseling: Body mass index is 33 89 kg/m²  The BMI is above normal  Nutrition recommendations include encouraging healthy choices of fruits and vegetables  Depression Screening and Follow-up Plan: Patient's depression screening was positive with a PHQ-2 score of 4  Their PHQ-9 score was 15  Patient assessed for underlying major depression  Brief counseling provided and recommend additional follow-up/re-evaluation next office visit  See plan  Reason for visit is   Chief Complaint   Patient presents with    Anxiety    Virtual Regular Visit        Encounter provider Rashard Blunt MD    Provider located at [address] [place] [address]9 [phone]      Recent Visits  No visits were found meeting these conditions  Showing recent visits within past 7 days and meeting all other requirements     Today's Visits  Date Type Provider Dept   05/10/21 Telemedicine Rashard Blunt MD [facility] today's visits and meeting all other requirements     Future Appointments  No visits were found meeting these conditions  Showing future appointments within next 150 days and meeting all other requirements        The patient was identified by name and date of birth   Brenda Scott was informed that this is a telemedicine visit and that the visit is being conducted through [facility] and patient was informed that this is a secure, HIPAA-compliant platform  She agrees to proceed     My office door was closed  No one else was in the room  She acknowledged consent and understanding of privacy and security of the video platform  The patient has agreed to participate and understands they can discontinue the visit at any time  Patient is aware this is a billable service  Subjective  Maggi Blood is a 31 y o  female following up on anxiety  She is feeling like she is getting a bit worse in terms of anxiety  Feels like her anxiety is affecting her personal life  Is either not sleeping or oversleeping  Losing patience easily  Hard to be social   Motivation at work isn't there, not as productive  Doesn't care as much- feels like she is doing the minimum to get by  She is using the clonazepam daily or twice a day now  Feeling panicky frequently  She got a new job, accepted the offer  Says she was briefly excited but then the happiness went away  It will be a better schedule, good career step  Relationship with boyfriend isn't going well  Finds everything irritating  Feels on edge all the time  No SI    Was seeing a therapist but felt like she wasn't making any progress  It didn't seem constructive      She had previously been on lexapro and       HPI     Past Medical History:   Diagnosis Date    Allergic     Anxiety     Depression     Last assessed 6/5/2013    Disease of thyroid gland     GERD (gastroesophageal reflux disease)        Past Surgical History:   Procedure Laterality Date    CHOLECYSTECTOMY      CHOLECYSTECTOMY LAPAROSCOPIC N/A 5/18/2018    Procedure: CHOLECYSTECTOMY LAPAROSCOPIC;  Surgeon: Felix Aguilera MD;  Location:  MAIN OR;  Service: General    GALLBLADDER SURGERY  05/29/2018    TONSILLECTOMY      With Adenoidectomy       Current Outpatient Medications   Medication Sig Dispense Refill    Biotin 69157 MCG TABS Take 1 tablet by mouth daily      clonazePAM (KlonoPIN) 0 5 mg tablet Take 1 tablet (0 5 mg total) by mouth 2 (two) times a day as needed for anxiety 60 tablet 0    multivitamin (THERAGRAN) TABS Take 1 tablet by mouth daily      Norethin Ace-Eth Estrad-FE (TOOTIE 24 FE PO) Tootie 24 Fe 1 mg-20 mcg (24)/75 mg (4) tablet   TAKE 1 TABLET BY MOUTH EVERY DAY AS DIRECTED      spironolactone (ALDACTONE) 100 mg tablet Take 100 mg by mouth 2 (two) times a day       traZODone (DESYREL) 100 mg tablet Take 1 tablet (100 mg total) by mouth daily at bedtime 90 tablet 1    venlafaxine (EFFEXOR-XR) 150 mg 24 hr capsule Take 1 capsule (150 mg total) by mouth daily with breakfast 90 capsule 1    venlafaxine (EFFEXOR-XR) 37 5 mg 24 hr capsule Take 1 capsule (37 5 mg total) by mouth daily with breakfast In addition to the 150mg dose 30 capsule 2    albuterol (PROVENTIL HFA,VENTOLIN HFA) 90 mcg/act inhaler Inhale 2 puffs every 4 (four) hours as needed for wheezing       No current facility-administered medications for this visit  Allergies   Allergen Reactions    Other Allergic Rhinitis     Category: Seasonal allergies; Review of Systems    Video Exam    Vitals:    05/10/21 1152   Temp: 98 6 °F (37 °C)   Weight: 95 3 kg (210 lb)   Height: 5' 6" (1 676 m)       Physical Exam  Vitals signs and nursing note reviewed  Constitutional:       General: She is not in acute distress  Appearance: Normal appearance  HENT:      Head: Normocephalic and atraumatic  Mouth/Throat:      Mouth: Mucous membranes are moist    Eyes:      Conjunctiva/sclera: Conjunctivae normal    Pulmonary:      Effort: Pulmonary effort is normal  No respiratory distress  Neurological:      Mental Status: She is alert and oriented to person, place, and time     Psychiatric:         Mood and Affect: Mood normal          Behavior: Behavior normal           I spent 25 minutes directly with the patient during this visit      VIRTUAL VISIT DISCLAIMER    Aleks Smoker acknowledges that she has consented to an online visit or consultation  She understands that the online visit is based solely on information provided by her, and that, in the absence of a face-to-face physical evaluation by the physician, the diagnosis she receives is both limited and provisional in terms of accuracy and completeness  This is not intended to replace a full medical face-to-face evaluation by the physician  Bettina Tuttle understands and accepts these terms

## 2021-06-10 ENCOUNTER — TELEMEDICINE (OUTPATIENT)
Dept: FAMILY MEDICINE CLINIC | Facility: CLINIC | Age: 26
End: 2021-06-10
Payer: COMMERCIAL

## 2021-06-10 ENCOUNTER — PATIENT MESSAGE (OUTPATIENT)
Dept: FAMILY MEDICINE CLINIC | Facility: CLINIC | Age: 26
End: 2021-06-10

## 2021-06-10 VITALS — WEIGHT: 217 LBS | TEMPERATURE: 98.8 F | HEIGHT: 66 IN | BODY MASS INDEX: 34.87 KG/M2

## 2021-06-10 DIAGNOSIS — F41.9 ANXIETY: ICD-10-CM

## 2021-06-10 DIAGNOSIS — F33.1 MODERATE EPISODE OF RECURRENT MAJOR DEPRESSIVE DISORDER (HCC): Primary | ICD-10-CM

## 2021-06-10 PROCEDURE — 3725F SCREEN DEPRESSION PERFORMED: CPT | Performed by: FAMILY MEDICINE

## 2021-06-10 PROCEDURE — 99214 OFFICE O/P EST MOD 30 MIN: CPT | Performed by: FAMILY MEDICINE

## 2021-06-10 RX ORDER — FLUOXETINE 20 MG/1
20 TABLET, FILM COATED ORAL DAILY
Qty: 30 TABLET | Refills: 1 | Status: SHIPPED | OUTPATIENT
Start: 2021-06-10 | End: 2021-06-14

## 2021-06-10 RX ORDER — CLONAZEPAM 0.5 MG/1
.5-1 TABLET ORAL 2 TIMES DAILY PRN
Qty: 60 TABLET | Refills: 0 | Status: SHIPPED | OUTPATIENT
Start: 2021-06-10 | End: 2021-06-14

## 2021-06-10 NOTE — PROGRESS NOTES
Virtual Regular Visit      Assessment/Plan:    Problem List Items Addressed This Visit        Other    Anxiety    Relevant Medications    FLUoxetine (PROzac) 20 MG tablet    clonazePAM (KlonoPIN) 0 5 mg tablet    Moderate episode of recurrent major depressive disorder ([facility] [address] ) - Primary     D/c effexor (has tapered down to 37 5 mg currently) and start prozac 20 mg  She will email in 2 weeks with an update and follow up in 4 weeks  Continue clonazepam (refill sent)   Continue trazodone  She will let me know if she feels any worse or is not improving         Relevant Medications    FLUoxetine (PROzac) 20 MG tablet               Reason for visit is   Chief Complaint   Patient presents with    Anxiety    Depression    Virtual Regular Visit        Encounter provider Isabelle Power MD    Provider located at [address] [address] [phone]      Recent Visits  Date Type Provider Dept   06/10/21 Telemedicine Isabelle Power MD Pg  [address] [facility] recent visits within past 7 days and meeting all other requirements     Future Appointments  No visits were found meeting these conditions  Showing future appointments within next 150 days and meeting all other requirements        The patient was identified by name and date of birth  Maggi Blood was informed that this is a telemedicine visit and that the visit is being conducted through [address] [place] [address] Now and patient was informed that this is a secure, HIPAA-compliant platform  She agrees to proceed     My office door was closed  No one else was in the room  She acknowledged consent and understanding of privacy and security of the video platform  The patient has agreed to participate and understands they can discontinue the visit at any time  Patient is aware this is a billable service  Subjective  Maggi Blood is a 31 y o  female following up on depression and anxiety    She has been tapering down effexor with plan to start on prozac  She is noting more panic attacks and anxiety  Having crying episodes which is unusual for her  A lot of stress situationally  She finishes at her current job next week  Has a new job lined up- but needs to take the insurance test over b/c she missed the cutoff by 6%    GM has mental health issues and is struggling- hard for patient to see this  Feels emotionally detached  Relationship possibly ending  Finds herself startling easily  No SI or thoughts of self harm  With effexor xr- she is down to 37 5 mg this week  BRANDY-7 Flowsheet Screening      Most Recent Value   Over the last two weeks, how often have you been bothered by the following problems? Feeling nervous, anxious, or on edge  3   Not being able to stop or control worrying  3   Worrying too much about different things  3   Trouble relaxing   3   Being so restless that it's hard to sit still  2   Becoming easily annoyed or irritable   3   Feeling afraid as if something awful might happen  2   How difficult have these problems made it for you to do your work, take care of things at home, or get along with other people? Very difficult   BRANDY Score   19        PHQ-9 Depression Screening    PHQ-9:   Frequency of the following problems over the past two weeks:      Little interest or pleasure in doing things: 3 - nearly every day  Feeling down, depressed, or hopeless: 3 - nearly every day  Trouble falling or staying asleep, or sleeping too much: 1 - several days  Feeling tired or having little energy: 3 - nearly every day  Poor appetite or overeating: 3 - nearly every day  Feeling bad about yourself - or that you are a failure or have let yourself or your family down: 3 - nearly every day  Trouble concentrating on things, such as reading the newspaper or watching television: 3 - nearly every day  Moving or speaking so slowly that other people could have noticed   Or the opposite - being so fidgety or restless that you have been moving around a lot more than usual: 0 - not at all  Thoughts that you would be better off dead, or of hurting yourself in some way: 0 - not at all  PHQ-2 Score: 6  PHQ-9 Score: 19         HPI     Past Medical History:   Diagnosis Date    Allergic     Anxiety     Depression     Last assessed 6/5/2013    Disease of thyroid gland     GERD (gastroesophageal reflux disease)        Past Surgical History:   Procedure Laterality Date    CHOLECYSTECTOMY      CHOLECYSTECTOMY LAPAROSCOPIC N/A 5/18/2018    Procedure: CHOLECYSTECTOMY LAPAROSCOPIC;  Surgeon: Sophy Hernandez MD;  Location[facility] OR;  Service: General    GALLBLADDER SURGERY  05/29/2018    TONSILLECTOMY      With Adenoidectomy       Current Outpatient Medications   Medication Sig Dispense Refill    albuterol (PROVENTIL HFA,VENTOLIN HFA) 90 mcg/act inhaler Inhale 2 puffs every 4 (four) hours as needed for wheezing      Biotin 79436 MCG TABS Take 1 tablet by mouth daily      clonazePAM (KlonoPIN) 0 5 mg tablet Take 1-2 tablets (0 5-1 mg total) by mouth 2 (two) times a day as needed for anxiety 60 tablet 0    multivitamin (THERAGRAN) TABS Take 1 tablet by mouth daily      Norethin Ace-Eth Estrad-FE (TOOTIE 24 FE PO) Tootie 24 Fe 1 mg-20 mcg (24)/75 mg (4) tablet   TAKE 1 TABLET BY MOUTH EVERY DAY AS DIRECTED      spironolactone (ALDACTONE) 100 mg tablet Take 100 mg by mouth 2 (two) times a day       traZODone (DESYREL) 100 mg tablet Take 1 tablet (100 mg total) by mouth daily at bedtime 90 tablet 1    FLUoxetine (PROzac) 20 MG tablet Take 1 tablet (20 mg total) by mouth daily 30 tablet 1     No current facility-administered medications for this visit  Allergies   Allergen Reactions    Other Allergic Rhinitis     Category: Seasonal allergies;         Review of Systems    Video Exam    Vitals:    06/10/21 0813   Temp: 98 8 °F (37 1 °C)   Weight: 98 4 kg (217 lb)   Height: 5' 6" (1 676 m) Physical Exam   Mood- anxious and depressed  I spent 25 minutes directly with the patient during this visit      VIRTUAL VISIT DISCLAIMER    Veena Archibald acknowledges that she has consented to an online visit or consultation  She understands that the online visit is based solely on information provided by her, and that, in the absence of a face-to-face physical evaluation by the physician, the diagnosis she receives is both limited and provisional in terms of accuracy and completeness  This is not intended to replace a full medical face-to-face evaluation by the physician  Veena Archibald understands and accepts these terms

## 2021-06-11 PROBLEM — F33.1 MODERATE EPISODE OF RECURRENT MAJOR DEPRESSIVE DISORDER (HCC): Status: ACTIVE | Noted: 2021-06-11

## 2021-06-11 NOTE — ASSESSMENT & PLAN NOTE
D/c effexor (has tapered down to 37 5 mg currently) and start prozac 20 mg  She will email in 2 weeks with an update and follow up in 4 weeks     Continue clonazepam (refill sent)   Continue trazodone  She will let me know if she feels any worse or is not improving

## 2021-06-13 ENCOUNTER — HOSPITAL ENCOUNTER (EMERGENCY)
Facility: HOSPITAL | Age: 26
Discharge: HOME | End: 2021-06-14
Attending: EMERGENCY MEDICINE | Admitting: EMERGENCY MEDICINE
Payer: COMMERCIAL

## 2021-06-13 ENCOUNTER — APPOINTMENT (EMERGENCY)
Dept: RADIOLOGY | Facility: HOSPITAL | Age: 26
End: 2021-06-13
Payer: COMMERCIAL

## 2021-06-13 ENCOUNTER — PATIENT MESSAGE (OUTPATIENT)
Dept: FAMILY MEDICINE CLINIC | Facility: CLINIC | Age: 26
End: 2021-06-13

## 2021-06-13 DIAGNOSIS — F41.9 ANXIETY: ICD-10-CM

## 2021-06-13 DIAGNOSIS — R55 SYNCOPE: Primary | ICD-10-CM

## 2021-06-13 DIAGNOSIS — R11.2 NAUSEA VOMITING AND DIARRHEA: ICD-10-CM

## 2021-06-13 DIAGNOSIS — R19.7 NAUSEA VOMITING AND DIARRHEA: ICD-10-CM

## 2021-06-13 LAB
ALBUMIN SERPL BCP-MCNC: 3.9 G/DL (ref 3.5–5)
ALP SERPL-CCNC: 50 U/L (ref 46–116)
ALT SERPL W P-5'-P-CCNC: 28 U/L (ref 12–78)
ANION GAP SERPL CALCULATED.3IONS-SCNC: 5 MMOL/L (ref 4–13)
AST SERPL W P-5'-P-CCNC: 19 U/L (ref 5–45)
BASOPHILS # BLD AUTO: 0.04 THOUSANDS/ΜL (ref 0–0.1)
BASOPHILS NFR BLD AUTO: 1 % (ref 0–1)
BILIRUB SERPL-MCNC: 0.25 MG/DL (ref 0.2–1)
BUN SERPL-MCNC: 10 MG/DL (ref 5–25)
CALCIUM SERPL-MCNC: 9.8 MG/DL (ref 8.3–10.1)
CHLORIDE SERPL-SCNC: 103 MMOL/L (ref 100–108)
CO2 SERPL-SCNC: 31 MMOL/L (ref 21–32)
CREAT SERPL-MCNC: 0.78 MG/DL (ref 0.6–1.3)
D DIMER PPP FEU-MCNC: <0.27 UG/ML FEU
EOSINOPHIL # BLD AUTO: 0.06 THOUSAND/ΜL (ref 0–0.61)
EOSINOPHIL NFR BLD AUTO: 1 % (ref 0–6)
ERYTHROCYTE [DISTWIDTH] IN BLOOD BY AUTOMATED COUNT: 11.8 % (ref 11.6–15.1)
GFR SERPL CREATININE-BSD FRML MDRD: 105 ML/MIN/1.73SQ M
GLUCOSE SERPL-MCNC: 103 MG/DL (ref 65–140)
HCT VFR BLD AUTO: 41.4 % (ref 34.8–46.1)
HGB BLD-MCNC: 13.9 G/DL (ref 11.5–15.4)
IMM GRANULOCYTES # BLD AUTO: 0.03 THOUSAND/UL (ref 0–0.2)
IMM GRANULOCYTES NFR BLD AUTO: 0 % (ref 0–2)
LYMPHOCYTES # BLD AUTO: 3.01 THOUSANDS/ΜL (ref 0.6–4.47)
LYMPHOCYTES NFR BLD AUTO: 34 % (ref 14–44)
MCH RBC QN AUTO: 32 PG (ref 26.8–34.3)
MCHC RBC AUTO-ENTMCNC: 33.6 G/DL (ref 31.4–37.4)
MCV RBC AUTO: 95 FL (ref 82–98)
MONOCYTES # BLD AUTO: 0.8 THOUSAND/ΜL (ref 0.17–1.22)
MONOCYTES NFR BLD AUTO: 9 % (ref 4–12)
NEUTROPHILS # BLD AUTO: 4.93 THOUSANDS/ΜL (ref 1.85–7.62)
NEUTS SEG NFR BLD AUTO: 55 % (ref 43–75)
NRBC BLD AUTO-RTO: 0 /100 WBCS
PLATELET # BLD AUTO: 348 THOUSANDS/UL (ref 149–390)
PMV BLD AUTO: 10.2 FL (ref 8.9–12.7)
POTASSIUM SERPL-SCNC: 4.1 MMOL/L (ref 3.5–5.3)
PROT SERPL-MCNC: 7.8 G/DL (ref 6.4–8.2)
RBC # BLD AUTO: 4.35 MILLION/UL (ref 3.81–5.12)
SODIUM SERPL-SCNC: 139 MMOL/L (ref 136–145)
TROPONIN I SERPL-MCNC: <0.02 NG/ML
WBC # BLD AUTO: 8.87 THOUSAND/UL (ref 4.31–10.16)

## 2021-06-13 PROCEDURE — 93005 ELECTROCARDIOGRAM TRACING: CPT

## 2021-06-13 PROCEDURE — 99285 EMERGENCY DEPT VISIT HI MDM: CPT | Performed by: EMERGENCY MEDICINE

## 2021-06-13 PROCEDURE — 85379 FIBRIN DEGRADATION QUANT: CPT | Performed by: EMERGENCY MEDICINE

## 2021-06-13 PROCEDURE — 84484 ASSAY OF TROPONIN QUANT: CPT | Performed by: EMERGENCY MEDICINE

## 2021-06-13 PROCEDURE — 99284 EMERGENCY DEPT VISIT MOD MDM: CPT

## 2021-06-13 PROCEDURE — 80053 COMPREHEN METABOLIC PANEL: CPT | Performed by: EMERGENCY MEDICINE

## 2021-06-13 PROCEDURE — 85025 COMPLETE CBC W/AUTO DIFF WBC: CPT | Performed by: EMERGENCY MEDICINE

## 2021-06-13 PROCEDURE — 96361 HYDRATE IV INFUSION ADD-ON: CPT

## 2021-06-13 PROCEDURE — 36415 COLL VENOUS BLD VENIPUNCTURE: CPT | Performed by: EMERGENCY MEDICINE

## 2021-06-13 PROCEDURE — 71045 X-RAY EXAM CHEST 1 VIEW: CPT

## 2021-06-13 PROCEDURE — 96374 THER/PROPH/DIAG INJ IV PUSH: CPT

## 2021-06-13 RX ORDER — ONDANSETRON 2 MG/ML
4 INJECTION INTRAMUSCULAR; INTRAVENOUS ONCE
Status: COMPLETED | OUTPATIENT
Start: 2021-06-13 | End: 2021-06-14

## 2021-06-13 RX ORDER — SODIUM CHLORIDE 9 MG/ML
3 INJECTION INTRAVENOUS
Status: DISCONTINUED | OUTPATIENT
Start: 2021-06-13 | End: 2021-06-14 | Stop reason: HOSPADM

## 2021-06-13 RX ORDER — LORAZEPAM 2 MG/ML
1 INJECTION INTRAMUSCULAR ONCE
Status: COMPLETED | OUTPATIENT
Start: 2021-06-13 | End: 2021-06-13

## 2021-06-13 RX ORDER — ONDANSETRON 4 MG/1
4 TABLET, FILM COATED ORAL EVERY 8 HOURS PRN
Qty: 12 TABLET | Refills: 0 | Status: SHIPPED | OUTPATIENT
Start: 2021-06-13 | End: 2021-06-14

## 2021-06-13 RX ADMIN — LORAZEPAM 1 MG: 2 INJECTION INTRAMUSCULAR; INTRAVENOUS at 22:41

## 2021-06-13 RX ADMIN — SODIUM CHLORIDE 1000 ML: 0.9 INJECTION, SOLUTION INTRAVENOUS at 23:09

## 2021-06-14 ENCOUNTER — TELEPHONE (OUTPATIENT)
Dept: FAMILY MEDICINE CLINIC | Facility: CLINIC | Age: 26
End: 2021-06-14

## 2021-06-14 ENCOUNTER — OFFICE VISIT (OUTPATIENT)
Dept: FAMILY MEDICINE CLINIC | Facility: CLINIC | Age: 26
End: 2021-06-14
Payer: COMMERCIAL

## 2021-06-14 VITALS
OXYGEN SATURATION: 98 % | HEIGHT: 66 IN | HEART RATE: 90 BPM | SYSTOLIC BLOOD PRESSURE: 135 MMHG | BODY MASS INDEX: 35.36 KG/M2 | WEIGHT: 220 LBS | TEMPERATURE: 97.7 F | DIASTOLIC BLOOD PRESSURE: 60 MMHG | RESPIRATION RATE: 20 BRPM

## 2021-06-14 VITALS
SYSTOLIC BLOOD PRESSURE: 110 MMHG | WEIGHT: 221.25 LBS | BODY MASS INDEX: 35.56 KG/M2 | HEART RATE: 90 BPM | HEIGHT: 66 IN | RESPIRATION RATE: 16 BRPM | TEMPERATURE: 98 F | DIASTOLIC BLOOD PRESSURE: 72 MMHG | OXYGEN SATURATION: 97 %

## 2021-06-14 DIAGNOSIS — R23.8 EASY BRUISING: ICD-10-CM

## 2021-06-14 DIAGNOSIS — R55 SYNCOPE, UNSPECIFIED SYNCOPE TYPE: ICD-10-CM

## 2021-06-14 DIAGNOSIS — F33.1 MODERATE EPISODE OF RECURRENT MAJOR DEPRESSIVE DISORDER (HCC): ICD-10-CM

## 2021-06-14 DIAGNOSIS — F41.9 ANXIETY: ICD-10-CM

## 2021-06-14 DIAGNOSIS — G47.00 INSOMNIA, UNSPECIFIED TYPE: Primary | ICD-10-CM

## 2021-06-14 LAB
ATRIAL RATE: 86 BPM
P AXIS: 37 DEGREES
PR INTERVAL: 176 MS
QRS AXIS: 76 DEGREES
QRSD INTERVAL: 90 MS
QT INTERVAL: 340 MS
QTC INTERVAL: 406 MS
T WAVE AXIS: 26 DEGREES
VENTRICULAR RATE: 86 BPM

## 2021-06-14 PROCEDURE — 3008F BODY MASS INDEX DOCD: CPT | Performed by: FAMILY MEDICINE

## 2021-06-14 PROCEDURE — 93010 ELECTROCARDIOGRAM REPORT: CPT | Performed by: INTERNAL MEDICINE

## 2021-06-14 PROCEDURE — 99214 OFFICE O/P EST MOD 30 MIN: CPT | Performed by: FAMILY MEDICINE

## 2021-06-14 PROCEDURE — 96375 TX/PRO/DX INJ NEW DRUG ADDON: CPT

## 2021-06-14 PROCEDURE — 1036F TOBACCO NON-USER: CPT | Performed by: FAMILY MEDICINE

## 2021-06-14 RX ORDER — MULTIVITAMIN
1 TABLET ORAL DAILY
COMMUNITY

## 2021-06-14 RX ORDER — CLONAZEPAM 0.5 MG/1
.5-1 TABLET ORAL 2 TIMES DAILY PRN
Qty: 60 TABLET | Refills: 0
Start: 2021-06-14 | End: 2021-07-21 | Stop reason: SDUPTHER

## 2021-06-14 RX ORDER — TRAZODONE HYDROCHLORIDE 100 MG/1
100 TABLET ORAL
Start: 2021-06-14 | End: 2021-08-30 | Stop reason: SDUPTHER

## 2021-06-14 RX ORDER — BIOTIN 1 MG
1000 TABLET ORAL DAILY
COMMUNITY
End: 2022-05-24

## 2021-06-14 RX ORDER — FLUOXETINE 20 MG/1
40 TABLET, FILM COATED ORAL DAILY
Qty: 60 TABLET | Refills: 1 | Status: SHIPPED | OUTPATIENT
Start: 2021-06-14 | End: 2021-07-13

## 2021-06-14 RX ORDER — SPIRONOLACTONE 100 MG/1
100 TABLET, FILM COATED ORAL 2 TIMES DAILY
COMMUNITY

## 2021-06-14 RX ORDER — FLUOXETINE 20 MG/1
20 TABLET, FILM COATED ORAL DAILY
Start: 2021-06-14 | End: 2021-06-14 | Stop reason: SDUPTHER

## 2021-06-14 RX ORDER — ALBUTEROL SULFATE 90 UG/1
2 AEROSOL, METERED RESPIRATORY (INHALATION) EVERY 4 HOURS PRN
COMMUNITY
End: 2022-08-04 | Stop reason: SDUPTHER

## 2021-06-14 RX ORDER — NORETHINDRONE ACETATE AND ETHINYL ESTRADIOL AND FERROUS FUMARATE 1MG-20(24)
1 KIT ORAL DAILY
COMMUNITY

## 2021-06-14 RX ORDER — ONDANSETRON 4 MG/1
4 TABLET, ORALLY DISINTEGRATING ORAL EVERY 6 HOURS PRN
COMMUNITY
End: 2021-06-18

## 2021-06-14 RX ADMIN — ONDANSETRON 4 MG: 2 INJECTION INTRAMUSCULAR; INTRAVENOUS at 00:01

## 2021-06-14 NOTE — TELEPHONE ENCOUNTER
Patient was seen in the ED after she fainted  She was examined for fainting/ LLE swelling, she was advise to follow up with PCP  Scheduled appointment 6/14 at

## 2021-06-14 NOTE — ED TRIAGE NOTES
Pt c/o nausea and diarrhea since yesterday and a cough that started today  Pt denies fevers or sick contacts  Pt states she was on her way home from work this evening and just felt dizzy and weak  Pt got home and does not recall what happened but believes she passed out because her boyfriend called for help and she woke up to  standing over her and then was transported to the ED for eval   Pt c/o feeling very anxious and thinks it led to her having a panic attack  Pt states her last stool was gray and it freaked her out so she thinks that was what made her so anxious

## 2021-06-14 NOTE — TELEPHONE ENCOUNTER
From: Miguel Angel Pike  To: Paradise Scott MD  Sent: 6/13/2021 1:34 PM EDT  Subject: Non-Urgent Medical Question    Hi Dr Rolan Copeladn,    On Friday morning I woke up with swelling in my left ankle and noticed it getting progressively more swollen throughout the day but I didn't have any signs of an insect bite or rash  It hasn't been hot to the touch, red and I haven't been in any sort of pain  I still have full mobility to walk, run, push it forwards backwards and twist it around  I have been taking 2 Ibuprofen every 4-6 hours for inflammation and the swelling has not decreased  It actually had increased  On Saturday evening after work I went to the [place] [address] ER and they were not able to get me for hours so I ended up going home  I elevated my leg and put ice on it but this morning the swelling was the same  I am getting concerned that it could be something more serious as the swelling has expanded to my foot and calf as well  I attached photos from Saturday and today for reference  Could you please advise if I need to come in for an office visit to see you or what I should do? I also haven't changed any personal   products, house hold products, or detergents       Please advise,    Ward Jean Baptiste

## 2021-06-14 NOTE — ED PROVIDER NOTES
History  Chief Complaint   Patient presents with    Panic Attack     58-year-old female presents emergency department status post syncopal episode  Patient says that she has been having nausea, couple episodes of vomiting, and diarrhea for the past 2 days  Today she was in her bedroom, feeling nauseated, when she developed lightheadedness, blurry vision, racing heart palpitations, chest tightness, and syncopized  Syncopal episode was unwitnessed but she thinks she was unconscious briefly  She feels somewhat improved at this time but still has some mild shortness of breath, chest tightness, and feels very anxious  She also notes that over the past 2 days she has had atraumatic swelling of her left ankle  No associated pain  No recent immobilization or surgery, hemoptysis, personal or family history of VTE  She does use oral contraceptives that contain estrogen  No personal history of cardiac disease and no family history of sudden unexplained or cardiac death at a young age  Denies fever, chills, cough, focal chest pain, abdominal pain, headache, focal neurological symptoms, any other complaints  Panic Attack  Associated symptoms: no abdominal pain, no chest pain and no headaches        Prior to Admission Medications   Prescriptions Last Dose Informant Patient Reported? Taking?    Biotin 89527 MCG TABS  Self Yes No   Sig: Take 1 tablet by mouth daily   FLUoxetine (PROzac) 20 MG tablet   No Yes   Sig: Take 1 tablet (20 mg total) by mouth daily   Norethin Ace-Eth Estrad-FE (TOOTIE 24 FE PO)  Self Yes Yes   Sig: Tootie 24 Fe 1 mg-20 mcg (24)/75 mg (4) tablet   TAKE 1 TABLET BY MOUTH EVERY DAY AS DIRECTED   albuterol (PROVENTIL HFA,VENTOLIN HFA) 90 mcg/act inhaler  Self Yes No   Sig: Inhale 2 puffs every 4 (four) hours as needed for wheezing   clonazePAM (KlonoPIN) 0 5 mg tablet   No Yes   Sig: Take 1-2 tablets (0 5-1 mg total) by mouth 2 (two) times a day as needed for anxiety   multivitamin (THERAGRAN) TABS  Self Yes No   Sig: Take 1 tablet by mouth daily   spironolactone (ALDACTONE) 100 mg tablet  Self Yes Yes   Sig: Take 100 mg by mouth 2 (two) times a day    traZODone (DESYREL) 100 mg tablet   No Yes   Sig: Take 1 tablet (100 mg total) by mouth daily at bedtime      Facility-Administered Medications: None       Past Medical History:   Diagnosis Date    Allergic     Anxiety     Depression     Last assessed 2013    Disease of thyroid gland     GERD (gastroesophageal reflux disease)        Past Surgical History:   Procedure Laterality Date    CHOLECYSTECTOMY      CHOLECYSTECTOMY LAPAROSCOPIC N/A 2018    Procedure: CHOLECYSTECTOMY LAPAROSCOPIC;  Surgeon: Najma Hou MD;  Location:  MAIN OR;  Service: General    GALLBLADDER SURGERY  2018    TONSILLECTOMY      With Adenoidectomy       Family History   Problem Relation Age of Onset    Anxiety disorder Mother     Hypertension Father     Thyroid cancer Father     Cancer Father     No Known Problems Brother     Suicide Attempts Paternal Grandmother         suicide attempts started age 45s  again age 78  ECT    Alcohol abuse Paternal Grandmother     Depression Paternal Grandmother     Mental illness Paternal Grandmother     Suicide Attempts Paternal Uncle         death by suicide    Mental illness Paternal Uncle     Substance Abuse Neg Hx     Mental illness Neg Hx      I have reviewed and agree with the history as documented  E-Cigarette/Vaping    E-Cigarette Use Never User      E-Cigarette/Vaping Substances    Nicotine No     THC No     CBD No     Flavoring No      Social History     Tobacco Use    Smoking status: Former Smoker     Years: 1 00     Start date: 2009     Quit date: 2012     Years since quittin 0    Smokeless tobacco: Never Used    Tobacco comment: social smoker   Vaping Use    Vaping Use: Never used   Substance Use Topics    Alcohol use:  Yes     Alcohol/week: 0 0 standard drinks Comment: socially    Drug use: No        Review of Systems   Constitutional: Negative  Negative for chills and fever  HENT: Negative  Negative for rhinorrhea  Eyes: Negative  Respiratory: Positive for chest tightness and shortness of breath  Negative for cough  Cardiovascular: Positive for leg swelling  Negative for chest pain  Gastrointestinal: Positive for diarrhea, nausea and vomiting  Negative for abdominal pain  Genitourinary: Negative  Negative for dysuria, flank pain and frequency  Musculoskeletal: Negative  Negative for back pain and neck pain  Skin: Negative  Negative for rash  Neurological: Positive for syncope and light-headedness  Negative for headaches  All other systems reviewed and are negative  Physical Exam  ED Triage Vitals [06/13/21 2145]   Temperature Pulse Respirations Blood Pressure SpO2   97 7 °F (36 5 °C) 96 22 135/60 98 %      Temp src Heart Rate Source Patient Position - Orthostatic VS BP Location FiO2 (%)   -- Monitor Lying Left arm --      Pain Score       --             Orthostatic Vital Signs  Vitals:    06/13/21 2145   BP: 135/60   Pulse: 96   Patient Position - Orthostatic VS: Lying       Physical Exam  Vitals and nursing note reviewed  Constitutional:       General: She is not in acute distress  Appearance: She is well-developed  HENT:      Head: Normocephalic and atraumatic  Mouth/Throat:      Mouth: Mucous membranes are moist    Eyes:      Pupils: Pupils are equal, round, and reactive to light  Cardiovascular:      Rate and Rhythm: Normal rate and regular rhythm  Pulses:           Radial pulses are 2+ on the right side and 2+ on the left side  Heart sounds: Normal heart sounds  No murmur heard  No friction rub  No gallop  Pulmonary:      Effort: Pulmonary effort is normal  No respiratory distress  Breath sounds: Normal breath sounds  No stridor  No wheezing, rhonchi or rales     Abdominal:      Palpations: Abdomen is soft  Tenderness: There is no abdominal tenderness  There is no right CVA tenderness, left CVA tenderness, guarding or rebound  Musculoskeletal:         General: Swelling (Mild swelling to left ankle without ecchymosis or tenderness) present  No tenderness  Normal range of motion  Cervical back: Normal range of motion and neck supple  Comments: No calf tenderness   Skin:     General: Skin is warm and dry  Capillary Refill: Capillary refill takes less than 2 seconds  Neurological:      Mental Status: She is alert and oriented to person, place, and time  Cranial Nerves: No cranial nerve deficit  Comments: Clear fluent speech   Psychiatric:         Mood and Affect: Mood is anxious           ED Medications  Medications   sodium chloride (PF) 0 9 % injection 3 mL (has no administration in time range)   sodium chloride 0 9 % bolus 1,000 mL (1,000 mL Intravenous New Bag 6/13/21 2309)   ondansetron (ZOFRAN) injection 4 mg (has no administration in time range)   LORazepam (ATIVAN) injection 1 mg (1 mg Intravenous Given 6/13/21 2241)       Diagnostic Studies  Results Reviewed     Procedure Component Value Units Date/Time    D-dimer, quantitative [[id number]]  (Normal) Collected: 06/13/21 2240    Lab Status: Final result Specimen: Blood from Arm, Left Updated: 06/13/21 2315     D-Dimer, Quant <0 27 ug/ml FEU     Troponin I [[id number]]  (Normal) Collected: 06/13/21 2240    Lab Status: Final result Specimen: Blood from Arm, Left Updated: 06/13/21 2313     Troponin I <0 02 ng/mL     Comprehensive metabolic panel [[id number]] Collected: 06/13/21 2240    Lab Status: Final result Specimen: Blood from Arm, Left Updated: 06/13/21 2312     Sodium 139 mmol/L      Potassium 4 1 mmol/L      Chloride 103 mmol/L      CO2 31 mmol/L      ANION GAP 5 mmol/L      BUN 10 mg/dL      Creatinine 0 78 mg/dL      Glucose 103 mg/dL      Calcium 9 8 mg/dL      AST 19 U/L      ALT 28 U/L      Alkaline Phosphatase 50 U/L Total Protein 7 8 g/dL      Albumin 3 9 g/dL      Total Bilirubin 0 25 mg/dL      eGFR 105 ml/min/1 73sq m     Narrative:      [place] guidelines for Chronic Kidney Disease (CKD):     Stage 1 with normal or high GFR (GFR > 90 mL/min/1 73 square meters)    Stage 2 Mild CKD (GFR = 60-89 mL/min/1 73 square meters)    Stage 3A Moderate CKD (GFR = 45-59 mL/min/1 73 square meters)    Stage 3B Moderate CKD (GFR = 30-44 mL/min/1 73 square meters)    Stage 4 Severe CKD (GFR = 15-29 mL/min/1 73 square meters)    Stage 5 End Stage CKD (GFR <15 mL/min/1 73 square meters)  Note: GFR calculation is accurate only with a steady state creatinine    CBC and differential [[id number]] Collected: 06/13/21 2240    Lab Status: Final result Specimen: Blood from Arm, Left Updated: 06/13/21 2258     WBC 8 87 Thousand/uL      RBC 4 35 Million/uL      Hemoglobin 13 9 g/dL      Hematocrit 41 4 %      MCV 95 fL      MCH 32 0 pg      MCHC 33 6 g/dL      RDW 11 8 %      MPV 10 2 fL      Platelets 532 Thousands/uL      nRBC 0 /100 WBCs      Neutrophils Relative 55 %      Immat GRANS % 0 %      Lymphocytes Relative 34 %      Monocytes Relative 9 %      Eosinophils Relative 1 %      Basophils Relative 1 %      Neutrophils Absolute 4 93 Thousands/µL      Immature Grans Absolute 0 03 Thousand/uL      Lymphocytes Absolute 3 01 Thousands/µL      Monocytes Absolute 0 80 Thousand/µL      Eosinophils Absolute 0 06 Thousand/µL      Basophils Absolute 0 04 Thousands/µL     POCT pregnancy, urine [[id number]]     Lab Status: No result                  XR chest 1 view   ED Interpretation by Jojo Galvin MD (06/13 6592)   No acute cardiopulmonary disease            Procedures  Procedures      ED Course  ED Course as of Jun 13 2350   Sun Jun 13, 2021   2249 Procedure Note: EKG  Date/Time: 06/13/21 10:49 PM   Interpreted by: Samy Palmer  Indications / Diagnosis: Syncope  ECG reviewed by me, the ED Provider: yes   The EKG demonstrates:  Rate: 86  Rhythm: normal sinus  Intervals: normal intervals  Axis: normal axis  QRS/Blocks: normal QRS  ST Changes: No acute ST Changes, no STD/RAMONA  Sunitha Mart Criteria for PE      Most Recent Value   Abner' Criteria for PE   Clinical signs and symptoms of DVT  0 Filed at: 06/13/2021 2349   PE is primary diagnosis or equally likely  0 Filed at: 06/13/2021 2349   HR >100  0 Filed at: 06/13/2021 2349   Immobilization at least 3 days or Surgery in the previous 4 weeks  0 Filed at: 06/13/2021 2349   Previous, objectively diagnosed PE or DVT  0 Filed at: 06/13/2021 2349   Hemoptysis  0 Filed at: 06/13/2021 2349   Malignancy with treatment within 6 months or palliative  0 Filed at: 06/13/2021 2349   Caryl Criteria Total  0 Filed at: 06/13/2021 2349        Sunitha Mart Criteria for DVT      Most Recent Value   Abner' Criteria for DVT   Active cancer Treatment or palliation within 6 months  0 Filed at: 06/13/2021 2348   Bedridden recently >3 days or major surgery within 12 weeks  0 Filed at: 06/13/2021 2348   Calf swelling >3 cm compared to the other leg  0 Filed at: 06/13/2021 2348   Entire leg swollen  0 Filed at: 06/13/2021 2348   Collateral (nonvaricose) superficial veins present  0 Filed at: 06/13/2021 2348   Localized tenderness along the deep venous system  0 Filed at: 06/13/2021 2348   Pitting edema, confined to symptomatic leg  0 Filed at: 06/13/2021 2348   Paralysis, paresis, or recent plaster immobilization of the lower extremity  0 Filed at: 06/13/2021 2348   Previously documented DVT  0 Filed at: 06/13/2021 2348   Alternative diagnosis to DVT as likely or more likely  0 Filed at: 06/13/2021 2348   Abner DVT Critera Score  0 Filed at: 06/13/2021 2348          MDM  Number of Diagnoses or Management Options  Anxiety  Nausea vomiting and diarrhea  Syncope  Diagnosis management comments: 14-year-old female presents emergency department status post syncopal episode    Within differential consider vasovagal, orthostatic, cardiogenic, PE  Obtain cardiac panel and D-dimer to further evaluate  Final assessment: Workup reassuring  Patient feels significantly improved on re-exam  Strict ED return precautions provided should symptoms worsen and patient can otherwise follow up outpatient  Patient expresses an understanding and agreement with the plan and remains in good condition for discharge  Disposition  Final diagnoses:   Syncope   Anxiety   Nausea vomiting and diarrhea     Time reflects when diagnosis was documented in both MDM as applicable and the Disposition within this note     Time User Action Codes Description Comment    6/13/2021 11:35 PM Minor, Jennie Add [R55] Syncope     6/13/2021 11:35 PM Minor, Jennie Add [F41 9] Anxiety     6/13/2021 11:35 PM Minor, Jennie Add [R11 2] Nausea and vomiting     6/13/2021 11:35 PM Minor, Jennie Add [R11 2,  R19 7] Nausea vomiting and diarrhea     6/13/2021 11:35 PM Minor, Jennie Remove [R11 2] Nausea and vomiting       ED Disposition     ED Disposition Condition Date/Time Comment    Discharge Stable Sun Jun 13, 2021 11:35 PM Jamia Larry discharge to home/self care  Follow-up Information     Follow up With Specialties Details Why Contact Info Additional Information    Angelo Deleon MD Family Medicine Call in 1 day  [place] [id number]    [address] [facility] [address] [facility] Emergency Department Emergency Medicine Go to  If symptoms worsen [address] [place] [address] Avenue  [address] [place] [address] Emergency Department, [address] [place] [address], [address]   [phone]          Patient's Medications   Discharge Prescriptions    ONDANSETRON (ZOFRAN) 4 MG TABLET    Take 1 tablet (4 mg total) by mouth every 8 (eight) hours as needed for nausea       Start Date: 6/13/2021 End Date: --       Order Dose: 4 mg       Quantity: 12 tablet    Refills: 0 No discharge procedures on file  PDMP Review       Value Time User    PDMP Reviewed  Yes 6/10/2021  6:28 PM Hodan Shaffer MD           ED Provider  Attending physically available and evaluated [facility] managed the patient along with the ED Attending      Electronically Signed by         Lonnie Sheth MD  06/13/21 0423

## 2021-06-14 NOTE — TELEPHONE ENCOUNTER
----- Message from Jamia Larry sent at 6/13/2021  1:34 PM EDT -----  Regarding: Non-Urgent Medical Question  Contact: [phone]  Hi Dr Joelle Monae,    On Friday morning I woke up with swelling in my left ankle and noticed it getting progressively more swollen throughout the day but I didn't have any signs of an insect bite or rash  It hasn't been hot to the touch, red and I haven't been in any sort of pain  I still have full mobility to walk, run, push it forwards backwards and twist it around  I have been taking 2 Ibuprofen every 4-6 hours for inflammation and the swelling has not decreased  It actually had increased  On Saturday evening after work I went to the [facility] ER and they were not able to get me for hours so I ended up going home  I elevated my leg and put ice on it but this morning the swelling was the same  I am getting concerned that it could be something more serious as the swelling has expanded to my foot and calf as well  I attached photos from Saturday and today for reference  Could you please advise if I need to come in for an office visit to see you or what I should do? I also haven't changed any personal products, house hold products, or detergents       Please advise,    Chelly Briggs

## 2021-06-14 NOTE — PROGRESS NOTES
Assessment/Plan:       Problem List Items Addressed This Visit        Other    Anxiety    Relevant Medications    clonazePAM (KlonoPIN) 0 5 mg tablet    FLUoxetine (PROzac) 20 MG tablet    Insomnia - Primary     Continue trazodone 100 mg nightly         Relevant Medications    traZODone (DESYREL) 100 mg tablet    Moderate episode of recurrent major depressive disorder (HCC)     Starting prozac, increase to 40 mg daily after one week  Has clonazepam for prn anxiety, with goal to decrease use as prozac is more effective  I suspect some of her recent symptoms are related to tapering off effexor, starting new medication  She will set up appointment with therapist in office  F/u in 4 weeks, sooner if needed  Relevant Medications    traZODone (DESYREL) 100 mg tablet    FLUoxetine (PROzac) 20 MG tablet      Other Visit Diagnoses     Easy bruising        check labs as noted  one small ecchymosis about the size of a dime on the R upper arm today    Relevant Orders    VWF Activity    APTT    Protime-INR    Non-intractable vomiting with nausea, unspecified vomiting type        Syncope, unspecified syncope type        presumed vasovagal syncope, with anxiety and recent med changes contributing  reviewed ER labs/report               Most recent labs reviewed       Subjective:     Ward Jean Baptiste is a 31 y o  female here today with chief complaint below:  Chief Complaint   Patient presents with    Other     ED follow up - fainting/ L ankle/calf swelling      - CC above per clinical staff and reviewed  HPI:  Patient is here to f/u in ER visit for syncope and ankle swelling  She had a negative troponin, neg d-dimer, normal EKG, normal CBC and CMP  Chest x-ray normal     She had noticed swelling in the L foot  No bug bites, no injury, no pain  It got larger over the weekend  All day yesterday took ibuprofen 2 tabs every 4-6 hours  Was working x 12 hours yesterday    Sat and Sunday had some nausea, had two episodes of vomiting in the AM, had diarrhea  As she was driving home from work yesterday, she started feeling lightheaded  Felt panicky, chest got tight, felt tense  Lost consciousness at home when she went to walk over to her boyfriend at home  She woke up to seeing EMS, police, etc   Roxann Hernandez to [facility] by ambulance  Says the ER doc suggested possible vasovagal syncope  Notes she does tend to get easier bruising  No family hx of blood disorders  The following portions of the patient's history were reviewed and updated as appropriate: allergies, current medications, past family history, past medical history, past social history, past surgical history and problem list     ROS:  Review of Systems       Objective:      /72   Pulse 90   Temp 98 °F (36 7 °C)   Resp 16   Ht 5' 6" (1 676 m)   Wt 100 kg (221 lb 4 oz)   SpO2 97%   BMI 35 71 kg/m²   BP Readings from Last 3 Encounters:   06/14/21 110/72   06/13/21 135/60   07/22/20 120/74     Wt Readings from Last 3 Encounters:   06/14/21 100 kg (221 lb 4 oz)   06/13/21 99 8 kg (220 lb)   06/10/21 98 4 kg (217 lb)               Physical Exam:   Physical Exam  Vitals and nursing note reviewed  Constitutional:       Appearance: Normal appearance  She is well-developed  HENT:      Head: Normocephalic and atraumatic  Eyes:      Conjunctiva/sclera: Conjunctivae normal    Cardiovascular:      Rate and Rhythm: Normal rate and regular rhythm  Heart sounds: Normal heart sounds  No murmur heard  Pulmonary:      Effort: Pulmonary effort is normal  No respiratory distress  Breath sounds: Normal breath sounds  No wheezing  Abdominal:      Palpations: Abdomen is soft  Tenderness: There is no abdominal tenderness  There is no guarding or rebound  Musculoskeletal:      Cervical back: Neck supple  Right lower leg: No edema  Left lower leg: No edema        Comments: No LE edema or calf tenderness   Lymphadenopathy: Cervical: No cervical adenopathy  Skin:     General: Skin is warm and dry  Neurological:      Mental Status: She is alert and oriented to person, place, and time     Psychiatric:         Behavior: Behavior normal       Comments: anxious

## 2021-06-15 NOTE — ED ATTENDING ATTESTATION
6/13/2021  IChery MD, saw and evaluated the patient  I have discussed the patient with the resident/non-physician practitioner and agree with the resident's/non-physician practitioner's findings, Plan of Care, and MDM as documented in the resident's/non-physician practitioner's note, except where noted  All available labs and Radiology studies were reviewed  I was present for key portions of any procedure(s) performed by the resident/non-physician practitioner and I was immediately available to provide assistance  At this point I agree with the current assessment done in the Emergency Department  I have conducted an independent evaluation of this patient a history and physical is as follows:  Syncope, chest discomfort, anxiety, also complains of mild swelling in her left lower extremity  Currently is still feeling anxious    Will plan to evaluate for VTE, EKG labs, symptomatic treatment  ED Course         Critical Care Time  Procedures
No

## 2021-06-15 NOTE — ASSESSMENT & PLAN NOTE
Starting prozac, increase to 40 mg daily after one week  Has clonazepam for prn anxiety, with goal to decrease use as prozac is more effective  I suspect some of her recent symptoms are related to tapering off effexor, starting new medication  She will set up appointment with therapist in office  F/u in 4 weeks, sooner if needed

## 2021-06-17 LAB
APTT PPP: 27 SEC (ref 23–32)
FACT XIIIA PPP-ACNC: 134 % NORMAL (ref 50–180)
INR PPP: 1
PROTHROMBIN TIME: 10.2 SEC (ref 9–11.5)
VWF AG ACT/NOR PPP IA: 141 % (ref 50–217)
VWF:RCO ACT/NOR PPP PL AGG: 155 % NORMAL (ref 42–200)

## 2021-06-18 ENCOUNTER — PATIENT MESSAGE (OUTPATIENT)
Dept: FAMILY MEDICINE CLINIC | Facility: CLINIC | Age: 26
End: 2021-06-18

## 2021-06-18 ENCOUNTER — TELEPHONE (OUTPATIENT)
Dept: FAMILY MEDICINE CLINIC | Facility: CLINIC | Age: 26
End: 2021-06-18

## 2021-06-18 DIAGNOSIS — R11.0 NAUSEA: Primary | ICD-10-CM

## 2021-06-18 RX ORDER — ONDANSETRON 4 MG/1
4 TABLET, FILM COATED ORAL EVERY 8 HOURS PRN
Qty: 20 TABLET | Refills: 0 | Status: SHIPPED | OUTPATIENT
Start: 2021-06-18 | End: 2021-09-16

## 2021-06-18 NOTE — TELEPHONE ENCOUNTER
----- Message from Heber Butt sent at 6/18/2021  6:46 AM EDT -----  Regarding: Visit Follow-Up Question  Contact: [phone]  Good Morning Dr Flako Downey,    I have been suffering from the worst nausea, vomiting, stomach pains and diarrhea  When I called my [facility] they said they had nothing from the the ER in regards to a prescription for nausea medication  So I assumed it was just a single dose for that evening  Is there anyway I would get something for this  It's just been extremely uncomfortable to the point that I'm barely eating just really drinking water and crackers or dry toast just when I take my daily medications  Please advise  Thank you and have a great weekend!      Danyel Johnson

## 2021-06-18 NOTE — TELEPHONE ENCOUNTER
From: Heber Butt  To: Karlie Nunez MD  Sent: 6/18/2021 6:46 AM EDT  Subject: Visit Follow-Up Question    Good Morning Dr Flako Downey,    I have been suffering from the worst nausea, vomiting, stomach pains and diarrhea  When I [facility] they said they had nothing from the the ER in regards to a prescription for nausea medication  So I assumed it was just a single dose for that evening  Is there anyway I would get something for this  It's just been extremely uncomfortable to the point that I'm barely eating just really drinking water and crackers or dry toast just when I take my daily medications  Please advise  Thank you and have a great weekend!      Danyel Johnson

## 2021-07-13 DIAGNOSIS — F41.9 ANXIETY: ICD-10-CM

## 2021-07-13 DIAGNOSIS — F33.1 MODERATE EPISODE OF RECURRENT MAJOR DEPRESSIVE DISORDER (HCC): ICD-10-CM

## 2021-07-13 RX ORDER — FLUOXETINE 20 MG/1
TABLET, FILM COATED ORAL
Qty: 60 TABLET | Refills: 1 | Status: SHIPPED | OUTPATIENT
Start: 2021-07-13 | End: 2021-07-22

## 2021-07-21 ENCOUNTER — PATIENT MESSAGE (OUTPATIENT)
Dept: FAMILY MEDICINE CLINIC | Facility: CLINIC | Age: 26
End: 2021-07-21

## 2021-07-21 DIAGNOSIS — F41.9 ANXIETY: ICD-10-CM

## 2021-07-21 NOTE — TELEPHONE ENCOUNTER
Medication refill requested: Clonazepam   Last office visit: 7/14/21  Next office visit: None  Last refilled: 6/14/21, 60 x 0  Pharmacy :   [address], [place] - [address] [facility] [address]  Phone: [phone] Fax: [phone]       Pended: 60 x 0

## 2021-07-22 DIAGNOSIS — F33.1 MODERATE EPISODE OF RECURRENT MAJOR DEPRESSIVE DISORDER (HCC): ICD-10-CM

## 2021-07-22 DIAGNOSIS — F41.9 ANXIETY: ICD-10-CM

## 2021-07-22 RX ORDER — FLUOXETINE 20 MG/1
TABLET, FILM COATED ORAL
Qty: 180 TABLET | Refills: 0 | Status: SHIPPED | OUTPATIENT
Start: 2021-07-22 | End: 2021-08-30 | Stop reason: SDUPTHER

## 2021-07-22 RX ORDER — CLONAZEPAM 0.5 MG/1
0.5 TABLET ORAL 2 TIMES DAILY PRN
Qty: 60 TABLET | Refills: 1 | Status: SHIPPED | OUTPATIENT
Start: 2021-07-22 | End: 2022-02-22 | Stop reason: SDUPTHER

## 2021-08-30 ENCOUNTER — PATIENT MESSAGE (OUTPATIENT)
Dept: FAMILY MEDICINE CLINIC | Facility: CLINIC | Age: 26
End: 2021-08-30

## 2021-08-30 DIAGNOSIS — G47.00 INSOMNIA, UNSPECIFIED TYPE: ICD-10-CM

## 2021-08-30 DIAGNOSIS — F33.1 MODERATE EPISODE OF RECURRENT MAJOR DEPRESSIVE DISORDER (HCC): ICD-10-CM

## 2021-08-30 DIAGNOSIS — F41.9 ANXIETY: ICD-10-CM

## 2021-08-30 RX ORDER — FLUOXETINE 20 MG/1
40 TABLET, FILM COATED ORAL DAILY
Qty: 60 TABLET | Refills: 0 | Status: SHIPPED | OUTPATIENT
Start: 2021-08-30 | End: 2022-02-21 | Stop reason: SDUPTHER

## 2021-08-30 RX ORDER — TRAZODONE HYDROCHLORIDE 100 MG/1
100 TABLET ORAL
Qty: 30 TABLET | Refills: 0 | Status: SHIPPED | OUTPATIENT
Start: 2021-08-30 | End: 2022-02-21 | Stop reason: SDUPTHER

## 2021-08-30 RX ORDER — FLUOXETINE 20 MG/1
40 TABLET, FILM COATED ORAL DAILY
Qty: 180 TABLET | Refills: 0 | Status: CANCELLED | OUTPATIENT
Start: 2021-08-30

## 2021-08-30 RX ORDER — TRAZODONE HYDROCHLORIDE 100 MG/1
100 TABLET ORAL
Qty: 30 TABLET | Refills: 1 | Status: SHIPPED | OUTPATIENT
Start: 2021-08-30 | End: 2021-08-30 | Stop reason: SDUPTHER

## 2021-08-30 NOTE — TELEPHONE ENCOUNTER
Sent trazodone  prozac- should be available at pharmacy  Agree w/ office visit for the back pain and f/u mood

## 2021-08-30 NOTE — TELEPHONE ENCOUNTER
Medication refill requested: Prozac 20 mg, Trazodone 100 mg   Last office visit: 6/14/21  Next office visit: No show 7/14/21  Last refilled: 7/22/21, 6/14/21  Pharmacy:   [facility], [facility] [address] [address]  Phone: [phone] Fax: [phone]      Pended: 30 x 0 and 180 x 1

## 2021-08-30 NOTE — TELEPHONE ENCOUNTER
From: Sadie Page  To: Arnaud Martines MD  Sent: 8/30/2021 8:46 AM EDT  Subject: Lisbeth Broderick,    I wanted to request for a refill of my Trazadone as well as the Fluoxetine? Yamileth been in between jobs and wasn't able to pay for the prescription refills out of pocket before  I also threw out my back moving some furniture around the other day and haven't been able to get relief with over the counter medication is there anyway I can get a muscle relaxer? Please advise      Thank you,    Nereida Sidhu

## 2021-08-30 NOTE — TELEPHONE ENCOUNTER
Looks like Dr Rosita Morris just sent a 90 day supply or Prozac    the pt is overdue for a visit  Trazodone is set to no print   Please schedule her a visit

## 2021-08-30 NOTE — TELEPHONE ENCOUNTER
Scheduled pt appointment on 9/25 at 08:40  Pt is currently uninsured, so she was unable to get the full 90 day supply of her medications  She is requesting 30 day supply of both trazodone and fluoxetine

## 2021-09-07 ENCOUNTER — TELEMEDICINE (OUTPATIENT)
Dept: FAMILY MEDICINE CLINIC | Facility: CLINIC | Age: 26
End: 2021-09-07

## 2021-09-07 VITALS — TEMPERATURE: 98.6 F | BODY MASS INDEX: 35.52 KG/M2 | WEIGHT: 221 LBS | HEIGHT: 66 IN

## 2021-09-07 DIAGNOSIS — E66.9 CLASS 2 OBESITY WITH BODY MASS INDEX (BMI) OF 35.0 TO 35.9 IN ADULT, UNSPECIFIED OBESITY TYPE, UNSPECIFIED WHETHER SERIOUS COMORBIDITY PRESENT: ICD-10-CM

## 2021-09-07 DIAGNOSIS — B34.9 VIRAL INFECTION, UNSPECIFIED: Primary | ICD-10-CM

## 2021-09-07 PROCEDURE — U0005 INFEC AGEN DETEC AMPLI PROBE: HCPCS | Performed by: FAMILY MEDICINE

## 2021-09-07 PROCEDURE — 99214 OFFICE O/P EST MOD 30 MIN: CPT | Performed by: FAMILY MEDICINE

## 2021-09-07 PROCEDURE — U0003 INFECTIOUS AGENT DETECTION BY NUCLEIC ACID (DNA OR RNA); SEVERE ACUTE RESPIRATORY SYNDROME CORONAVIRUS 2 (SARS-COV-2) (CORONAVIRUS DISEASE [COVID-19]), AMPLIFIED PROBE TECHNIQUE, MAKING USE OF HIGH THROUGHPUT TECHNOLOGIES AS DESCRIBED BY CMS-2020-01-R: HCPCS | Performed by: FAMILY MEDICINE

## 2021-09-07 NOTE — PATIENT INSTRUCTIONS
[address]    Your healthcare provider and/or public health staff have evaluated you and have determined that you do not need to remain in the hospital at this time  At this time you can be isolated at home where you will be monitored by staff from your local or state health department  You should carefully follow the prevention and isolation steps below until a healthcare provider or local or state health department says that you can return to your normal activities  Stay home except to get medical care    People who are mildly ill with COVID-19 are able to isolate at home during their illness  You should restrict activities outside your home, except for getting medical care  Do not go to work, school, or public areas  Avoid using public transportation, ride-sharing, or taxis  Separate yourself from other people and animals in your home    People: As much as possible, you should stay in a specific room and away from other people in your home  Also, you should use a separate bathroom, if available  Animals: You should restrict contact with pets and other animals while you are sick with COVID-19, just like you would around other people  Although there have not been reports of pets or other animals becoming sick with COVID-19, it is still recommended that people sick with COVID-19 limit contact with animals until more information is known about the virus  When possible, have another member of your household care for your animals while you are sick  If you are sick with COVID-19, avoid contact with your pet, including petting, snuggling, being kissed or licked, and sharing food  If you must care for your pet or be around animals while you are sick, wash your hands before and after you interact with pets and wear a facemask  See COVID-19 and Animals for more information      Call ahead before visiting your doctor    If you have a medical appointment, call the healthcare provider and tell them that you have or may have COVID-19  This will help the healthcare providers office take steps to keep other people from getting infected or exposed  Wear a facemask    You should wear a facemask when you are around other people (e g , sharing a room or vehicle) or pets and before you enter a healthcare providers office  If you are not able to wear a facemask (for example, because it causes trouble breathing), then people who live with you should not stay in the same room with you, or they should wear a facemask if they enter your room  Cover your coughs and sneezes    Cover your mouth and nose with a tissue when you cough or sneeze  Throw used tissues in a lined trash can  Immediately wash your hands with soap and water for at least 20 seconds or, if soap and water are not available, clean your hands with an alcohol-based hand  that contains at least 60% alcohol  Clean your hands often    Wash your hands often with soap and water for at least 20 seconds, especially after blowing your nose, coughing, or sneezing; going to the bathroom; and before eating or preparing food  If soap and water are not readily available, use an alcohol-based hand  with at least 60% alcohol, covering all surfaces of your hands and rubbing them together until they feel dry  Soap and water are the best option if hands are visibly dirty  Avoid touching your eyes, nose, and mouth with unwashed hands  Avoid sharing personal household items    You should not share dishes, drinking glasses, cups, eating utensils, towels, or bedding with other people or pets in your home  After using these items, they should be washed thoroughly with soap and water  Clean all high-touch surfaces everyday    High touch surfaces include counters, tabletops, doorknobs, bathroom fixtures, toilets, phones, keyboards, tablets, and bedside tables  Also, clean any surfaces that may have blood, stool, or body fluids on them   Use a household cleaning spray or wipe, according to the label instructions  Labels contain instructions for safe and effective use of the cleaning product including precautions you should take when applying the product, such as wearing gloves and making sure you have good ventilation during use of the product  Monitor your symptoms    Seek prompt medical attention if your illness is worsening (e g , difficulty breathing)  Before seeking care, call your healthcare provider and tell them that you have, or are being evaluated for, COVID-19  Put on a facemask before you enter the facility  These steps will help the healthcare providers office to keep other people in the office or waiting room from getting infected or exposed  Ask your healthcare provider to call the local or [facility] health department  Persons who are placed under active monitoring or facilitated self-monitoring should follow instructions provided by their local health department or occupational health professionals, as appropriate  If you have a medical emergency and need to call 911, notify the dispatch personnel that you have, or are being evaluated for COVID-19  If possible, put on a facemask before emergency medical services arrive      Discontinuing home isolation    Patients with confirmed COVID-19 should remain under home isolation precautions until the following conditions are met:   - They have had no fever for at least 24 hours (that is one full day of no fever without the use medicine that reduces fevers)  AND  - other symptoms have improved (for example, when their cough or shortness of breath have improved)  AND  - If had mild or moderate illness, at least 10 days have passed since their symptoms first appeared or if severe illness (needed oxygen) or immunosuppressed, at least 20 days have passed since symptoms first appeared  Patients with confirmed COVID-19 should also notify close contacts (including their workplace) and ask that they self-quarantine  Currently, close contact is defined as being within 6 feet for 15 minutes or more from the period 24 hours starting 48 hours before symptom onset to the time at which the patient went into isolation  Close contacts of patients diagnosed with COVID-19 should be instructed by the patient to self-quarantine for 14 days from the last time of their last contact with the patient  Source: [employer]    COVID TESTING TRIAGE:    Other Screening: (Travel, Work, School, etc  with no known direct exposure to [address]) - Refer to Ludi ([employer], [employer], [employer]) Call ahead to see if these locations provide these services  In an effort to best serve our community in this time of increased COVID activity, [facility] will only be providing COVID testing for those individuals who are symptomatic or have had a direct exposure to a COVID case  Unfortunately, due to resource constraints we are unable to provide testing for asymptomatic individuals who need a swab for clearance to travel, or return to work or school  Please note that [facility] Employee's in need of testing for return to work in a Network position can continue to obtain testing through this hotline  Thank you in advance for your understanding and cooperation during these challenging times  Symptomatic Patient or Exposed Patient(Close Contact with COVID + Person):  Testing Sites:   [facility]: [address], [place]  oHours: Monday-Friday 8:00A - 5:00P  [address] ([facility])oAddress: [address] [facility] [address] [place], [place] [address]LKEHWQ: Monday-Friday 8:00A - 5:00P   [address] [facility])oAddress: [address] [facility], [place] [address]EZSDYI: Monday-Friday 8:00A - 5:00P  [address]Address: [address] [place], [place] [address]n Hours: Monday-Friday 8:00A - 5:00P   [facility]Address: [address], [place], [place] [address]CVIMUL: Monday-Friday 8:00A - 5:00P & Saturday 8:00A - 1:00P   [facility]  [employer] oAddress: [address] [place]  [address](located in the parking lot behind the building)oHours: Monday-Friday 8:00A - 1:00P   [address] [place] [address] oAddress: [address] [facility], [place], [place]  [address]nGcxpe: Monday-Friday 10:00A - 2:00P   Care Now Sites:  [address]  Now [address] [facility]) oAddress: [address], [address]  [facility], [facility], [address]: Monday-Friday 7:30A - 10:30P & Sat/Sun 8:00A - 8:00P   [facility] [place] ([facility])oAddress: [address] [facility] Center[facility] [address]zHtekn: Monday-Friday 7:30A - 10:30P & Sat/Sun 8A - 8P  [address] Now- [place]Address: [address] [facility] [address]yMlhof: Monday-Friday 8:00A - 8:00P & Sat/Sun 8:00A - 4:00P  [address] ([address] [facility],6[address] [facility] Center)oAddress: [address]: Monday-Friday 8:00A - 8:00P & Sat/Sun 8:00A - 4:00P  [address] [facility] oAddress: [address]) [place]: Monday-Friday 8:00A - 8:00P  Clement Pilling [facility] Now-[place]Address: [address] [place], [place] [address]ALMMQL: Monday-Friday 8:00A-8:00P & Sat/Sun 8:00A - 4:00P  [address] Now- [place]Address: [address] [facility] [address]tXkpam: Monday-Friday 8:00A - 8:00P & Sat/Sun 8:00A - 4:00P  [address]  [facility] [address], [place] [address]JMHVXM: Monday-Friday 8:00A - 8:00P  [address] Now- [facility]: [place], [place] [address]uWfyzs: 7 days a week 8:00A - 8:00P    [address] [facility] Now- [place]Address: [address] [place] [address]kWnrqm: Monday-Friday 8:00A - 8:00P  [address] [facility]- [facility]Address: [address] [place], [place] [address]kPgudz: Monday-Friday 8:00A - 8:00P & Sat/Sun 8:00A - 4:00P   [facility])oAddress: [address] [place], [place] [address]IAUVCE: Monday-Friday 8:00A - 8:00P  [address] Now- [place]Address: [address] [address]FZAVUS: Monday-Friday 8:00A - 8:00P & Sat/Sun 8:00A - 4:00PSt  [facility]- [place]oAddress: [address]  [place], [place] [address]KGYBHF: 7 days a week 8:00A - 8:00P    [facility]- [place]oAddress: [address], [place] [address]XGNBBJ: 7 days a week 8:00A - 8:00P   [address] [facility] Now- [place]Address: [address], [place], [place] [address]kCgvqc: Monday-Friday 7:30A - 10:30P & Sat/Sun 8:00A - 8:00P    Bamlanivimab and etesevimab are investigational medicines used to treat mild to moderate symptoms of COVID-19 in non-hospitalized adults and adolescents (15years of age and older who weigh at least 80 pounds (40 kg)), and who are at high risk for developing severe COVID-19 symptoms or the need for hospitalization  Bamlanivimab and etesevimab are investigational because they are still being studied  There is limited information known about the safety and effectiveness of using bamlanivimab and etesevimab to treat people with COVID-19  The FDA has authorized the emergency use of bamlanivimab and etesevimab for the treatment of COVID-19 under an Emergency Use Authorization (EUA)  How will I receive bamlanivimab and etesevimab?  Bamlanivimab and etesevimab are given at the same time through a vein (intravenous or IV)   You will receive one dose of bamlanivimab and etesevimab by IV infusion  The infusion will take 21-60 minutes or longer  Possible side effects of bamlanivimab and etesevimab: Allergic reactions can happen during and after infusion with bamlanivimab and etesevimab      Possible reactions include: fever, chills, nausea, headache, shortness of breath, low or high blood pressure, rapid or slow heart rate, chest discomfort or pain, weakness, confusion, feeling tired, wheezing, swelling of your lips, face, or throat, rash including hives, itching, muscle aches, dizziness, and sweating  These reactions may be severe or life threatening  Worsening symptoms after treatment: You may experience new or worsening symptoms after infusion, including fever, difficulty breathing, rapid or slow heart rate, tiredness, weakness or confusion  If these occur, contact your healthcare provider or seek immediate medical attention as some of these events have required hospitalization  It is unknown if these events are related to treatment or are due to the progression of COVID19  The side effects of getting any medicine by vein may include brief pain, bleeding, bruising of the skin, soreness, swelling, and possible infection at the infusion site  These are not all the possible side effects of bamlanivimab and etesevimab  Not a lot of people have been given bamlanivimab and etesevimab  Serious and unexpected side effects may happen  Bamlanivimab and etesevimab are still being studied so it is possible that all of the risks are not known at this time  It is possible that bamlanivimab and etesevimab could interfere with your body's own ability to fight off a future infection of SARS-CoV-2  Similarly, bamlanivimab and etesevimab may reduce your bodys immune response to a vaccine for SARS-CoV-2  Specific studies have not been conducted to address these possible risks  Talk to your healthcare provider if you have any questions  Emergency Use Authorization:    The [facility] FDA has made bamlanivimab and etesevimab available under an emergency access mechanism called an EUA   The EUA is supported by a  of Health and Human Service (HHS) declaration that circumstances exist to justify the emergency use of drugs and biological products during the COVID-19 pandemic  Bamlanivimab and etesevimab have not undergone the same type of review as an FDA-approved or cleared product  The FDA may issue an EUA when certain criteria are met, which includes that there are no adequate, approved, and available alternatives  In addition, the FDA decision is based on the totality of scientific evidence available showing that it is reasonable to believe that the product meets certain criteria for safety, performance, and labeling and may be effective in treatment of patients during the COVID-19 pandemic  All of these criteria must be met to allow for the product to be used in the treatment of patients during the COVID-19 pandemic  The EUA for bamlanivimab and etesevimab together is in effect for the duration of the COVID-19 declaration justifying emergency use of these products, unless terminated or revoked (after which the product may no longer be used)  What if I am pregnant or breastfeeding? There is limited experience treating pregnant women or breastfeeding mothers with bamlanivimab and etesevimab  For a mother and unborn baby, the benefit of receiving bamlanivimab and etesevimab may be greater than the risk from the treatment  If you are pregnant or breastfeeding, discuss your options and specific situation with your healthcare provider  Regarding COVID-19 Vaccination:    Currently there is no data or safety or efficacy of COVID-19 vaccination in persons who received monoclonal antibodies as part of COVID-19 treatment  Treatment should be deferred for at least 90 days to avoid interference of the treatment with vaccine-induced immune responses (this is based on estimated half-life of therapies and evidence suggesting reinfection is uncommon within 90 days of initial infection)      Full fact sheet document for patients can be found at: [facility] pt    The patient consents to proceed with bamlanivimab and etesevimab infusion  Possible side effects of bamlanivimab are: Allergic reactions   Allergic reactions can happen during and after infusion with bamlanivimab which include:    fever, chills, nausea, headache, shortness of breath, low blood pressure, wheezing, swelling of your lips, face, or  throat, rash including hives, itching, muscle aches, and dizziness  The side effects of getting any medicine by vein may include brief pain, bleeding, bruising of the skin, soreness,  swelling, and possible infection at the infusion site  These are not all the possible side effects of bamlanivimab  Not a lot of people have been given bamlanivimab  Serious and unexpected side effects may happen  Korey Loja is still being studied so it is possible that all of  the risks are not known at this time  Please note that this drug was approved under the Emergency Use Authorization   of the FDA and has not gone through the full, formal FDA approval process    It is possible that bamlanivimab could interfere with your body's own ability to fight off a future infection of  SARS-CoV-2  Similarly, bamlanivimab may reduce your bodys immune response to a vaccine for SARS-CoV-2  Specific studies have not been conducted to address these possible risks  Currently there is no data or safety or efficacy of COVID-19 vaccination in persons who received monoclonal antibodies   as part of COVID-19 treatment  Treatment should be deferred for at least 90 days to avoid interference of the treatment   with vaccine-induced immune responses (this is based on estimated half-life of therapies and evidence suggesting reinfection   is uncommon within 90 days of initial infection)  The patient consents to proceed with bamlanivimab infusion      Table 2: Treatment-emergent Adverse Events Reported in at Least 1% of All  Subjects in BLAZE-1*         Bamlinivimab  Symptom Placebo 700 mg (N=101) 2000 mg (N=107) 7000 mg (N=101) Total (N=309)   Nausea 4% 3% 4% 5% 4%   Diarrhea 5% 1% 2% 7% 3%   Dizziness 2% 3% 3% 3% 3%   Headache 2% 3% 2% 0% 2%   Pruritis 1% 2% 3% 0% 2%   Vomiting 3% 1% 3% 1% 2%     *[email or web address]/[email or web address]-eua-factsheet-hcp  pdf    Instructions for Bamlanivimab infusion at [facility]    1  Once you arrive at [facility], please park in the small parking lot at the [address] entrance  Do not park in the main parking lot or in the parking garage  2  When you arrive, please call the infusion nurse at [phone] to be escorted into the clinic  3  It will be approximately a 4-hour visit  4  No visitors can accompany you into the clinic  If you need a , they will need to wait in the car until treatment is over or we can call them 30 minutes before you will be ready for   5  Please bring something to occupy your time for 4 hours, i e  book, i-Pad, phone, headphones, etc   6  Please make sure you bring your mask  You must wear a mask for the duration of your treatment  7  The clinic will have light refreshments and snacks available

## 2021-09-07 NOTE — PROGRESS NOTES
COVID-19 Outpatient Progress Note    Assessment/Plan:    Problem List Items Addressed This Visit     None      Visit Diagnoses     Viral infection, unspecified    -  Primary    Relevant Orders    Novel Coronavirus (Covid-19),PCR [facility] - Collected at [facility] or [facility]    Class 2 obesity with body mass index (BMI) of 35 0 to 35 9 in adult, unspecified obesity type, unspecified whether serious comorbidity present             Disposition:     I referred patient to one of our centralized sites for a COVID-19 swab  Pending labs  Patient should remain in quarantine until further notice  If her COVID-19 test is negative, she can discontinue quarantine  If her COVID-19 test is positive, she will need to complete 10 days of isolation through Friday, 09/10/2021, and at the earliest ending on Saturday, 09/11/2021  Monoclonal antibody infusion therapy was briefly discussed and information was placed in patient's Portal for review  If patient is interested in this therapy, she will need further counseling  Use Albuterol HFA PRN  Advise [facility] sinus rinse kit, Mucinex, Claritin/Zyrtec/Allegra  Avoid decongestants if you have high blood pressure  I have spent 15 minutes directly with the patient  Greater than 50% of this time was spent in counseling/coordination of care regarding: diagnostic results, prognosis, risks and benefits of treatment options, instructions for management, patient and family education, importance of treatment compliance, risk factor reductions and impressions  Verification of patient location:    Patient is located in the following state in which I hold an active license [place]    Encounter provider Padmaja Lang DO    Provider located at [address] [place] [id number] [phone]    Recent Visits  No visits were found meeting these conditions    Showing recent visits within past 7 days and meeting all other requirements  Today's Visits  Date Type Provider Dept   09/07/21 Telemedicine Katlyn Mckeon DO [facility] today's visits and meeting all other requirements  Future Appointments  No visits were found meeting these conditions  Showing future appointments within next 150 days and meeting all other requirements     This virtual check-in was done via [employer] and patient was informed that this is a secure, HIPAA-compliant platform  She agrees to proceed  Patient agrees to participate in a virtual check in via telephone or video visit instead of presenting to the office to address urgent/immediate medical needs  Patient is aware this is a billable service  After connecting through [facility], the patient was identified by name and date of birth  Vaishnavi Georges was informed that this was a telemedicine visit and that the exam was being conducted confidentially over secure lines  My office door was closed  No one else was in the room  Vaishnavi Georges acknowledged consent and understanding of privacy and security of the telemedicine visit  I informed the patient that I have reviewed her record in Epic and presented the opportunity for her to ask any questions regarding the visit today  The patient agreed to participate  Subjective:   Vaishnavi Georges is a 31 y o  female who is concerned about COVID-19  Patient's symptoms include nasal congestion, rhinorrhea, cough (Wet), shortness of breath (With cough), chest tightness (With cough) and headache  Patient denies fever, chills, fatigue, sore throat, anosmia, loss of taste, abdominal pain, nausea, vomiting, diarrhea and myalgias       Date of symptom onset: 8/31/2021  COVID-19 vaccination status: Fully vaccinated    Exposure:   Contact with a person who is under investigation (PUI) for or who is positive for COVID-19 within the last 14 days?: No    Hospitalized recently for fever and/or lower respiratory symptoms?: No Currently a healthcare worker that is involved in direct patient care?: No      Works in a special setting where the risk of COVID-19 transmission may be high? (this may include long-term care, correctional and longterm facilities; homeless shelters; assisted-living facilities and group homes ): No      Resident in a special setting where the risk of COVID-19 transmission may be high? (this may include long-term care, correctional and longterm facilities; homeless shelters; assisted-living facilities and group homes ): No      Her apartment roof has been leaking under the carpet of her bedroom for an unknown amount of time  Her symptoms are worse when she is in her apartment, but symptoms are not totally resolved outside of her apartment  Using Mucinex liquid fast max c benefit            Lab Results   Component Value Date    SARSCOV2 Not Detected 12/02/2020     Past Medical History:   Diagnosis Date    Allergic     Anxiety     Depression     Last assessed 6/5/2013    Disease of thyroid gland     GERD (gastroesophageal reflux disease)      Past Surgical History:   Procedure Laterality Date    CHOLECYSTECTOMY      CHOLECYSTECTOMY LAPAROSCOPIC N/A 5/18/2018    Procedure: CHOLECYSTECTOMY LAPAROSCOPIC;  Surgeon: Hubert Arzate MD;  Location: [facility];  Service: General    GALLBLADDER SURGERY  05/29/2018    TONSILLECTOMY      With Adenoidectomy     Current Outpatient Medications   Medication Sig Dispense Refill    albuterol (PROVENTIL HFA,VENTOLIN HFA) 90 mcg/act inhaler Inhale 2 puffs every 4 (four) hours as needed for wheezing      Biotin 1000 MCG tablet Take 1,000 mcg by mouth daily      clonazePAM (KlonoPIN) 0 5 mg tablet Take 1 tablet (0 5 mg total) by mouth 2 (two) times a day as needed for anxiety 60 tablet 1    FLUoxetine (PROzac) 20 MG tablet Take 2 tablets (40 mg total) by mouth daily 60 tablet 0    Multiple Vitamin (multivitamin) tablet Take 1 tablet by mouth daily      norethindrone-ethinyl estradiol-ferrous fumarate (LOESTIN 24 FE) 1-20 MG-MCG(24) per tablet Take 1 tablet by mouth daily      ondansetron (ZOFRAN) 4 mg tablet Take 1 tablet (4 mg total) by mouth every 8 (eight) hours as needed for nausea or vomiting 20 tablet 0    spironolactone (ALDACTONE) 100 mg tablet Take 100 mg by mouth 2 (two) times a day      traZODone (DESYREL) 100 mg tablet Take 1 tablet (100 mg total) by mouth daily at bedtime 30 tablet 0     No current facility-administered medications for this visit  Allergies   Allergen Reactions    Other Allergic Rhinitis     Category: Seasonal allergies; Review of Systems   Constitutional: Negative for chills, fatigue and fever  HENT: Positive for congestion and rhinorrhea  Negative for sore throat  Respiratory: Positive for cough (Wet), chest tightness (With cough) and shortness of breath (With cough)  Gastrointestinal: Negative for abdominal pain, diarrhea, nausea and vomiting  Musculoskeletal: Negative for myalgias  Neurological: Positive for headaches  Objective:    Vitals:    09/07/21 1022   Temp: 98 6 °F (37 °C)   TempSrc: Oral   Weight: 100 kg (221 lb)   Height: 5' 6" (1 676 m)       Physical Exam  Vitals and nursing note reviewed  Constitutional:       General: She is not in acute distress  Appearance: Normal appearance  She is well-developed  She is obese  HENT:      Head: Normocephalic and atraumatic  Right Ear: External ear normal       Left Ear: External ear normal       Nose: Nose normal       Right Sinus: No maxillary sinus tenderness or frontal sinus tenderness  Left Sinus: No maxillary sinus tenderness or frontal sinus tenderness  Mouth/Throat:      Mouth: Mucous membranes are moist       Pharynx: Oropharynx is clear  Eyes:      Extraocular Movements: Extraocular movements intact  Conjunctiva/sclera: Conjunctivae normal    Cardiovascular:      Heart sounds: No murmur heard       Pulmonary: Effort: Pulmonary effort is normal  No respiratory distress  Musculoskeletal:         General: No swelling  Cervical back: Normal range of motion  Right lower leg: No edema  Left lower leg: No edema  Skin:     General: Skin is warm and dry  Neurological:      General: No focal deficit present  Mental Status: She is alert and oriented to person, place, and time  VIRTUAL VISIT DISCLAIMER    Anupam Zhang verbally agrees to participate in [employer]  Pt is aware that [employer] could be limited without vital signs or the ability to perform a full hands-on physical Percy Prost understands she or the provider may request at any time to terminate the video visit and request the patient to seek care or treatment in person

## 2021-09-08 ENCOUNTER — PATIENT MESSAGE (OUTPATIENT)
Dept: FAMILY MEDICINE CLINIC | Facility: CLINIC | Age: 26
End: 2021-09-08

## 2021-09-14 ENCOUNTER — PATIENT MESSAGE (OUTPATIENT)
Dept: FAMILY MEDICINE CLINIC | Facility: CLINIC | Age: 26
End: 2021-09-14

## 2021-09-14 NOTE — TELEPHONE ENCOUNTER
Patient called to schedule appointment, Dr Jose Miller has nothing this week, I offered her other providers and she refused, she will send another message on my chart and see if Dr Jose Miller will maybe just call her or call something in but she does not want to see anyone else

## 2021-12-23 NOTE — MISCELLANEOUS
Message   Recorded as Task   Date: 09/30/2016 04:53 PM, Created By: Konnie Seip   Task Name: Follow Up   Assigned To: Konnie Seip   Regarding Patient: Camilo Gonzales, Status: Active   Comment: Konnie Seip - 30 Sep 2016 4:53 PM     TASK CREATED  Please check on chest XR on Saturday, looking to r/o pneumonia  If it is no pneumonia please tell her, I told her it was most likely viral in nature and needed to run its course  If it is please give this to Flavio Reyna - 30 Sep 2016 4:59 PM     TASK REPLIED TO: Previously Assigned To Aldair Susan - 01 Oct 2016 10:33 AM     TASK REPLIED TO: Previously Assigned To Jeanette Shaffer  Same with this patient  Estrella Console Estrella Console I checked EPIC and no report just images? Rashaad Hanson - 01 Oct 2016 10:46 AM     TASK REPLIED TO: Previously Assigned To Jeanette Shaffer  tell pt the cxr report is not available yet and to call in Monday   do you know if efrain marked these xray s as stat? Amanda Griggs - 01 Oct 2016 11:41 AM     TASK REPLIED TO: Previously Assigned To Dolmaris Moya with patient she is aware there are no results yet and to follow Efrain's instructions and that I would send this message back to Harrison Bartlett for Monday  Konnie Seip - 03 Oct 2016 11:19 AM     TASK REPLIED TO: Previously Assigned To Konnie Seip  How can they still not be back? Can you look into this? Maybe the patient did not go to [address] [place] [address]? Amanda Griggs - 10 Oct 2016 11:53 AM     TASK REPLIED TO: Previously Assigned To Amanda Griggs  It was done down at [facility]  It is still not read as of this AM   I called and spoke with Santana Chaney in Radiology she will have it read  Konnie Seip - 76 Oct 2016 12:27 PM     TASK EDITED  Spoke with patient advised she does have pneumonia, will treat with zpack, prednisone, proair as needed  Needs to repeat CXR in 4-6 weeks  If no improvement in 48-72 hours return to office          Signatures   Electronically signed by : Karlie Wetzel [facility]; Oct  3 2016 12:27PM EST (Author) Hydroxyzine Counseling: Patient advised that the medication is sedating and not to drive a car after taking this medication.  Patient informed of potential adverse effects including but not limited to dry mouth, urinary retention, and blurry vision.  The patient verbalized understanding of the proper use and possible adverse effects of hydroxyzine.  All of the patient's questions and concerns were addressed.

## 2022-02-04 NOTE — TELEPHONE ENCOUNTER
----- Message from Carlito Murdock MD sent at 2/4/2022  9:39 AM CST -----  Please inform labs show she is not pregnant. We will discuss the other results at her f/u appt. From: [facility]  To: Harvey Triana MD  Sent: 6/10/2021 1:42 PM EDT  Subject: Prescription Question    Hi Dr John Núñez,    I wanted to apologize for missing our 8am appointment this morning  After I had gotten off the phone with the nurse for my intake information it was 8:15am and I didn't get the invitation to join with you until 9:17am and I was already at work by that point and couldn't get on the call  If you have any availability at all tomorrow I am off from work and could have an appointment with you  I am not doing well at all while going through this medicine change of decreasing the Effexor to prepare to go on Prozac  I am having a lot of panic attacks and general daily anxiety that is giving me trouble sleeping & concentrating  I have been needing more Klonopins then usual to calm me down and I feel like I'm in a constant state of panic  I also have been feeling severely depressed with little interest in doing anything  If we are able to do a medicine management through messages if you can't get me in tomorrow that would be great  Please let me know      Thank you,    Sadiq Casas

## 2022-02-21 ENCOUNTER — PATIENT MESSAGE (OUTPATIENT)
Dept: FAMILY MEDICINE CLINIC | Facility: CLINIC | Age: 27
End: 2022-02-21

## 2022-02-21 DIAGNOSIS — G47.00 INSOMNIA, UNSPECIFIED TYPE: ICD-10-CM

## 2022-02-21 DIAGNOSIS — F33.1 MODERATE EPISODE OF RECURRENT MAJOR DEPRESSIVE DISORDER (HCC): ICD-10-CM

## 2022-02-21 DIAGNOSIS — F41.9 ANXIETY: ICD-10-CM

## 2022-02-21 RX ORDER — CLONAZEPAM 0.5 MG/1
0.5 TABLET ORAL 2 TIMES DAILY PRN
Qty: 60 TABLET | Refills: 1 | Status: CANCELLED | OUTPATIENT
Start: 2022-02-21

## 2022-02-22 RX ORDER — FLUOXETINE 20 MG/1
40 TABLET, FILM COATED ORAL DAILY
Qty: 60 TABLET | Refills: 0 | Status: SHIPPED | OUTPATIENT
Start: 2022-02-22 | End: 2022-02-22 | Stop reason: SDUPTHER

## 2022-02-22 RX ORDER — FLUOXETINE 20 MG/1
40 TABLET, FILM COATED ORAL DAILY
Qty: 60 TABLET | Refills: 3 | Status: SHIPPED | OUTPATIENT
Start: 2022-02-22 | End: 2022-05-24 | Stop reason: SDUPTHER

## 2022-02-22 RX ORDER — CLONAZEPAM 0.5 MG/1
0.5 TABLET ORAL 2 TIMES DAILY PRN
Qty: 60 TABLET | Refills: 1 | Status: SHIPPED | OUTPATIENT
Start: 2022-02-22 | End: 2022-04-22 | Stop reason: SDUPTHER

## 2022-02-22 RX ORDER — TRAZODONE HYDROCHLORIDE 100 MG/1
100 TABLET ORAL
Qty: 30 TABLET | Refills: 0 | Status: SHIPPED | OUTPATIENT
Start: 2022-02-22 | End: 2022-02-22 | Stop reason: SDUPTHER

## 2022-02-22 RX ORDER — TRAZODONE HYDROCHLORIDE 100 MG/1
100 TABLET ORAL
Qty: 30 TABLET | Refills: 3 | Status: SHIPPED | OUTPATIENT
Start: 2022-02-22 | End: 2022-07-07 | Stop reason: SDUPTHER

## 2022-02-22 NOTE — TELEPHONE ENCOUNTER
Medication refill requested: KlonoPIN 0 5MG  Last office visit: 6/14/21  Next office visit: 3/30/22  Last refilled: 7/22/21  Pharmacy :   [facility], [address] [place] [id number]  Phone: [phone] Fax: [phone]       Pended: 60 X 1

## 2022-03-21 NOTE — PROGRESS NOTES
COVID-19 Outpatient Progress Note    Assessment/Plan:    Problem List Items Addressed This Visit     None      Visit Diagnoses     Viral infection, unspecified    -  Primary    Relevant Orders    Poct Covid 19 Rapid Antigen Test (Completed)    Eustachian tube dysfunction, bilateral        Obesity (BMI 35 0-39 9 without comorbidity)        Severe obesity (BMI 35 0-39  9) with comorbidity ([facility] [address] )        BMI 38 0-38 9,adult             Disposition:     Rapid antigen testing was performed and the result is negative for COVID-19  Risks and benefits of COVID-19 vaccination was discussed with patient  If her test is negative, she can discontinue quarantine  If her COVID test is positive, she will need to complete 5 days of isolation through Tuesday, 03/22/2022, at the earliest ending on Wednesday, 03/23/2022 if she meets fever criteria  She would need to mask for an additional 5 days  Advise Leigh Reyna med sinus rinse kit, Mucinex, Claritin/Zyrtec/Allegra, Flonase  Avoid decongestants if you have high blood pressure  You may use Tylenol (Acetaminophen) up to 3,000mg daily (in 24 hours) as needed for pain or fever  You may use Motrin (Ibuprofen) up to 800mg 3 times daily with food (in 24 hours) as needed for pain or fever  I have spent 20 minutes directly with the patient  Greater than 50% of this time was spent in counseling/coordination of care regarding: diagnostic results, prognosis, risks and benefits of treatment options, instructions for management, patient and family education, importance of treatment compliance, risk factor reductions and impressions  Encounter provider Yemi Burks DO    Provider located at [address] [place] [address],[address]  [address] [id number] [phone]    Recent Visits  No visits were found meeting these conditions    Showing recent visits within past 7 days and meeting all other requirements  Today's Visits  Date Type Provider Dept   03/22/22 Telemedicine DO Alexis Thompson   Showing today's visits and meeting all other requirements  Future Appointments  No visits were found meeting these conditions  Showing future appointments within next 150 days and meeting all other requirements     Subjective:   Veena Archibald is a 28 y o  female who is concerned about COVID-19  Patient's symptoms include nasal congestion, rhinorrhea, sore throat and cough  Patient denies fever, chills, fatigue, anosmia, loss of taste, shortness of breath, chest tightness, abdominal pain, nausea, vomiting, diarrhea, myalgias and headaches  - Date of symptom onset: 3/17/2022      COVID-19 vaccination status: Fully vaccinated (primary series)    Exposure:   Contact with a person who is under investigation (PUI) for or who is positive for COVID-19 within the last 14 days?: No    Hospitalized recently for fever and/or lower respiratory symptoms?: No      Currently a healthcare worker that is involved in direct patient care?: No      Works in a special setting where the risk of COVID-19 transmission may be high? (this may include long-term care, correctional and senior living facilities; homeless shelters; assisted-living facilities and group homes ): No      Resident in a special setting where the risk of COVID-19 transmission may be high? (this may include long-term care, correctional and senior living facilities; homeless shelters; assisted-living facilities and group homes ): No      She has not done a COVID home test, but has tests at home  Left > Right ear pain - Denies otorrhea or decreased hearing  Using Mucinex DM and Ibuprofen s benefit  Symptoms worsening over illness course  Not using Albuterol HFA          Lab Results   Component Value Date    SARSCOV2 Negative 09/07/2021    SARSCOV2 Not Detected 12/02/2020    SARSCOVAG Negative 03/22/2022     Past Medical History:   Diagnosis Date    Allergic     Anxiety     Depression Last assessed 6/5/2013    Disease of thyroid gland     GERD (gastroesophageal reflux disease)      Past Surgical History:   Procedure Laterality Date    CHOLECYSTECTOMY      CHOLECYSTECTOMY LAPAROSCOPIC N/A 5/18/2018    Procedure: CHOLECYSTECTOMY LAPAROSCOPIC;  Surgeon: Veronika Raya MD;  [facility]: [facility] OR;  Service: General    TONSILLECTOMY      With Adenoidectomy     Current Outpatient Medications   Medication Sig Dispense Refill    albuterol (PROVENTIL HFA,VENTOLIN HFA) 90 mcg/act inhaler Inhale 2 puffs every 4 (four) hours as needed for wheezing      ascorbic acid (VITAMIN C) 500 MG tablet Take 500 mg by mouth daily      Biotin 1000 MCG tablet Take 1,000 mcg by mouth daily      clonazePAM (KlonoPIN) 0 5 mg tablet Take 1 tablet (0 5 mg total) by mouth 2 (two) times a day as needed for anxiety 60 tablet 1    FLUoxetine (PROzac) 20 MG tablet Take 2 tablets (40 mg total) by mouth daily 60 tablet 3    Multiple Vitamin (multivitamin) tablet Take 1 tablet by mouth daily      norethindrone-ethinyl estradiol-ferrous fumarate (LOESTIN 24 FE) 1-20 MG-MCG(24) per tablet Take 1 tablet by mouth daily      spironolactone (ALDACTONE) 100 mg tablet Take 100 mg by mouth 2 (two) times a day      traZODone (DESYREL) 100 mg tablet Take 1 tablet (100 mg total) by mouth daily at bedtime 30 tablet 3     No current facility-administered medications for this visit  Allergies   Allergen Reactions    Other Allergic Rhinitis     Category: Seasonal allergies; Review of Systems   Constitutional: Negative for chills, fatigue and fever  HENT: Positive for congestion, rhinorrhea and sore throat  Respiratory: Positive for cough  Negative for chest tightness and shortness of breath  Gastrointestinal: Negative for abdominal pain, diarrhea, nausea and vomiting  Musculoskeletal: Negative for myalgias  Neurological: Negative for headaches       Objective:    Vitals:    03/22/22 1124   BP: 108/80   Pulse: 87   Resp: 18   Temp: 98 5 °F (36 9 °C)   SpO2: 97%   Weight: 107 kg (236 lb)   Height: 5' 6" (1 676 m)       Physical Exam  Vitals and nursing note reviewed  Constitutional:       General: She is not in acute distress  Appearance: Normal appearance  She is well-developed  She is obese  HENT:      Head: Normocephalic and atraumatic  Right Ear: Tympanic membrane, ear canal and external ear normal       Left Ear: Ear canal and external ear normal  A middle ear effusion (Clear) is present  Nose: Congestion present  Mouth/Throat:      Mouth: Mucous membranes are moist       Pharynx: Oropharynx is clear  No oropharyngeal exudate or posterior oropharyngeal erythema  Eyes:      Extraocular Movements: Extraocular movements intact  Conjunctiva/sclera: Conjunctivae normal    Cardiovascular:      Rate and Rhythm: Normal rate and regular rhythm  Pulses: Normal pulses  Heart sounds: No murmur heard  Pulmonary:      Effort: Pulmonary effort is normal  No respiratory distress  Breath sounds: Normal breath sounds  Musculoskeletal:         General: No swelling  Cervical back: Neck supple  Right lower leg: No edema  Left lower leg: No edema  Skin:     General: Skin is warm and dry  Neurological:      General: No focal deficit present  Mental Status: She is alert and oriented to person, place, and time  VIRTUAL VISIT DISCLAIMER    Heber Butt verbally agrees to participate in [employer]  Pt is aware that [employer] could be limited without vital signs or the ability to perform a full hands-on physical Reginald Tab understands she or the provider may request at any time to terminate the video visit and request the patient to seek care or treatment in person  BMI Counseling: Body mass index is 38 09 kg/m²  The BMI is above normal  Nutrition recommendations include 3-5 servings of fruits/vegetables daily   Exercise recommendations include exercising 3-5 times per week

## 2022-03-22 ENCOUNTER — TELEMEDICINE (OUTPATIENT)
Dept: FAMILY MEDICINE CLINIC | Facility: CLINIC | Age: 27
End: 2022-03-22
Payer: COMMERCIAL

## 2022-03-22 VITALS
DIASTOLIC BLOOD PRESSURE: 80 MMHG | SYSTOLIC BLOOD PRESSURE: 108 MMHG | TEMPERATURE: 98.5 F | HEART RATE: 87 BPM | OXYGEN SATURATION: 97 % | WEIGHT: 236 LBS | BODY MASS INDEX: 37.93 KG/M2 | RESPIRATION RATE: 18 BRPM | HEIGHT: 66 IN

## 2022-03-22 DIAGNOSIS — E66.9 OBESITY (BMI 35.0-39.9 WITHOUT COMORBIDITY): ICD-10-CM

## 2022-03-22 DIAGNOSIS — E66.01 SEVERE OBESITY (BMI 35.0-39.9) WITH COMORBIDITY (HCC): ICD-10-CM

## 2022-03-22 DIAGNOSIS — B34.9 VIRAL INFECTION, UNSPECIFIED: Primary | ICD-10-CM

## 2022-03-22 DIAGNOSIS — H69.83 EUSTACHIAN TUBE DYSFUNCTION, BILATERAL: ICD-10-CM

## 2022-03-22 LAB
SARS-COV-2 AG UPPER RESP QL IA: NEGATIVE
VALID CONTROL: NORMAL

## 2022-03-22 PROCEDURE — 87811 SARS-COV-2 COVID19 W/OPTIC: CPT | Performed by: FAMILY MEDICINE

## 2022-03-22 PROCEDURE — 1036F TOBACCO NON-USER: CPT | Performed by: FAMILY MEDICINE

## 2022-03-22 PROCEDURE — 99214 OFFICE O/P EST MOD 30 MIN: CPT | Performed by: FAMILY MEDICINE

## 2022-03-22 PROCEDURE — 3008F BODY MASS INDEX DOCD: CPT | Performed by: FAMILY MEDICINE

## 2022-03-22 NOTE — PATIENT INSTRUCTIONS
[address]    Your healthcare provider and/or public health staff have evaluated you and have determined that you do not need to remain in the hospital at this time  At this time you can be isolated at home where you will be monitored by staff from your local or state health department  You should carefully follow the prevention and isolation steps below until a healthcare provider or local or state health department says that you can return to your normal activities  Stay home except to get medical care    People who are mildly ill with COVID-19 are able to isolate at home during their illness  You should restrict activities outside your home, except for getting medical care  Do not go to work, school, or public areas  Avoid using public transportation, ride-sharing, or taxis  Separate yourself from other people and animals in your home    People: As much as possible, you should stay in a specific room and away from other people in your home  Also, you should use a separate bathroom, if available  Animals: You should restrict contact with pets and other animals while you are sick with COVID-19, just like you would around other people  Although there have not been reports of pets or other animals becoming sick with COVID-19, it is still recommended that people sick with COVID-19 limit contact with animals until more information is known about the virus  When possible, have another member of your household care for your animals while you are sick  If you are sick with COVID-19, avoid contact with your pet, including petting, snuggling, being kissed or licked, and sharing food  If you must care for your pet or be around animals while you are sick, wash your hands before and after you interact with pets and wear a facemask  See COVID-19 and Animals for more information      Call ahead before visiting your doctor    If you have a medical appointment, call the healthcare provider and tell them that you have or may have COVID-19  This will help the healthcare providers office take steps to keep other people from getting infected or exposed  Wear a facemask    You should wear a facemask when you are around other people (e g , sharing a room or vehicle) or pets and before you enter a healthcare providers office  If you are not able to wear a facemask (for example, because it causes trouble breathing), then people who live with you should not stay in the same room with you, or they should wear a facemask if they enter your room  Cover your coughs and sneezes    Cover your mouth and nose with a tissue when you cough or sneeze  Throw used tissues in a lined trash can  Immediately wash your hands with soap and water for at least 20 seconds or, if soap and water are not available, clean your hands with an alcohol-based hand  that contains at least 60% alcohol  Clean your hands often    Wash your hands often with soap and water for at least 20 seconds, especially after blowing your nose, coughing, or sneezing; going to the bathroom; and before eating or preparing food  If soap and water are not readily available, use an alcohol-based hand  with at least 60% alcohol, covering all surfaces of your hands and rubbing them together until they feel dry  Soap and water are the best option if hands are visibly dirty  Avoid touching your eyes, nose, and mouth with unwashed hands  Avoid sharing personal household items    You should not share dishes, drinking glasses, cups, eating utensils, towels, or bedding with other people or pets in your home  After using these items, they should be washed thoroughly with soap and water  Clean all high-touch surfaces everyday    High touch surfaces include counters, tabletops, doorknobs, bathroom fixtures, toilets, phones, keyboards, tablets, and bedside tables  Also, clean any surfaces that may have blood, stool, or body fluids on them   Use a household cleaning spray or wipe, according to the label instructions  Labels contain instructions for safe and effective use of the cleaning product including precautions you should take when applying the product, such as wearing gloves and making sure you have good ventilation during use of the product  Monitor your symptoms    Seek prompt medical attention if your illness is worsening (e g , difficulty breathing)  Before seeking care, call your healthcare provider and tell them that you have, or are being evaluated for, COVID-19  Put on a facemask before you enter the facility  These steps will help the healthcare providers office to keep other people in the office or waiting room from getting infected or exposed  Ask your healthcare provider to call the local or [facility] health department  Persons who are placed under active monitoring or facilitated self-monitoring should follow instructions provided by their local health department or occupational health professionals, as appropriate  If you have a medical emergency and need to call 911, notify the dispatch personnel that you have, or are being evaluated for COVID-19  If possible, put on a facemask before emergency medical services arrive      Discontinuing home isolation    Patients with confirmed COVID-19 should remain under home isolation precautions until the following conditions are met:   - They have had no fever for at least 24 hours (that is one full day of no fever without the use medicine that reduces fevers)  AND  - other symptoms have improved (for example, when their cough or shortness of breath have improved)  AND  - If had mild or moderate illness, at least 10 days have passed since their symptoms first appeared or if severe illness (needed oxygen) or immunosuppressed, at least 20 days have passed since symptoms first appeared  Patients with confirmed COVID-19 should also notify close contacts (including their workplace) and ask that they self-quarantine  Currently, close contact is defined as being within 6 feet for 15 minutes or more from the period 24 hours starting 48 hours before symptom onset to the time at which the patient went into isolation  Close contacts of patients diagnosed with COVID-19 should be instructed by the patient to self-quarantine for 14 days from the last time of their last contact with the patient  Source: [employer]    Patient will perform a COVID Home test later today  If her test is negative, she can discontinue quarantine  If her COVID test is positive, she will need to complete 5 days of isolation through Tuesday, 03/22/2022, at the earliest ending on Wednesday, 03/23/2022 if she meets fever criteria  She would need to mask for an additional 5 days  Obesity   AMBULATORY CARE:   Obesity  means your body mass index (BMI) is greater than 30  Your healthcare provider will use your height and weight to measure your BMI  The risks of obesity include  many health problems, including injuries or physical disability  · Diabetes (high blood sugar level)    · High blood pressure or high cholesterol    · Heart disease    · Stroke    · Gallbladder or liver disease    · Cancer of the colon, breast, prostate, liver, or kidney    · Sleep apnea    · Arthritis or gout    Screening  is done to check for health conditions before you have signs or symptoms  If you are 28to 79years old, your blood sugar level may be checked every 3 years for signs of prediabetes or diabetes  Your healthcare provider will check your blood pressure at each visit  High blood pressure can lead to a stroke or other problems  Your provider may check for signs of heart disease, cancer, or other health problems  Seek care immediately if:   · You have a severe headache, confusion, or difficulty speaking  · You have weakness on one side of your body      · You have chest pain, sweating, or shortness of breath  Call your doctor if:   · You have symptoms of gallbladder or liver disease, such as pain in your upper abdomen  · You have knee or hip pain and discomfort while walking  · You have symptoms of diabetes, such as intense hunger and thirst, and frequent urination  · You have symptoms of sleep apnea, such as snoring or daytime sleepiness  · You have questions or concerns about your condition or care  Treatment for obesity  focuses on helping you lose weight to improve your health  Even a small decrease in BMI can reduce the risk for many health problems  Your healthcare provider will help you set a weight-loss goal   · Lifestyle changes  are the first step in treating obesity  These include making healthy food choices and getting regular physical activity  Your healthcare provider may suggest a weight-loss program that involves coaching, education, and therapy  · Medicine  may help you lose weight when it is used with a healthy foods and physical activity  · Surgery  can help you lose weight if you are very obese and have other health problems  There are several types of weight-loss surgery  Ask your healthcare provider for more information  Tips for safe weight loss:   · Set small, realistic goals  An example of a small goal is to walk for 20 minutes 5 days a week  Anther goal is to lose 5% of your body weight  · Tell friends, family members, and coworkers about your goals  and ask for their support  Ask a friend to lose weight with you, or join a weight-loss support group  · Identify foods or triggers that may cause you to overeat , and find ways to avoid them  Remove tempting high-calorie foods from your home and workplace  Place a bowl of fresh fruit on your kitchen counter  If stress causes you to eat, then find other ways to cope with stress  A counselor or therapist may be able to help you  · Keep a diary to track what you eat and drink    Also write down how many minutes of physical activity you do each day  Weigh yourself once a week and record it in your diary  Eating changes: You will need to eat 500 to 1,000 fewer calories each day than you currently eat to lose 1 to 2 pounds a week  The following changes will help you cut calories:  · Eat smaller portions  Use small plates, no larger than 9 inches in diameter  Fill your plate half full of fruits and vegetables  Measure your food using measuring cups until you know what a serving size looks like  · Eat 3 meals and 1 or 2 snacks each day  Plan your meals in advance  Isabel Helms and eat at home most of the time  Eat slowly  Do not skip meals  Skipping meals can lead to overeating later in the day  This can make it harder for you to lose weight  Talk with a dietitian to help you make a meal plan and schedule that is right for you  · Eat fruits and vegetables at every meal   They are low in calories and high in fiber, which makes you feel full  Do not add butter, margarine, or cream sauce to vegetables  Use herbs to season steamed vegetables  · Eat less fat and fewer fried foods  Eat more baked or grilled chicken and fish  These protein sources are lower in calories and fat than red meat  Limit fast food  Dress your salads with olive oil and vinegar instead of bottled dressing  · Limit the amount of sugar you eat  Do not drink sugary beverages  Limit alcohol  Activity changes:  Physical activity is good for your body in many ways  It helps you burn calories and build strong muscles  It decreases stress and depression, and improves your mood  It can also help you sleep better  Talk to your healthcare provider before you begin an exercise program   · Exercise for at least 30 minutes 5 days a week  Start slowly  Set aside time each day for physical activity that you enjoy and that is convenient for you   It is best to do both weight training and an activity that increases your heart rate, such as walking, bicycling, or swimming  · Find ways to be more active  Do yard work and housecleaning  Walk up the stairs instead of using elevators  Spend your leisure time going to events that require walking, such as outdoor festivals or fairs  This extra physical activity can help you lose weight and keep it off  Follow up with your doctor as directed: You may need to meet with a dietitian  Write down your questions so you remember to ask them during your visits  © Copyright [employer] 2022 Information is for End User's use only and may not be sold, redistributed or otherwise used for commercial purposes  All illustrations and images included in CareNotes® are the copyrighted property of A D A M , Inc  or [facility] Reza   The above information is an  only  It is not intended as medical advice for individual conditions or treatments  Talk to your doctor, nurse or pharmacist before following any medical regimen to see if it is safe and effective for you  Advise Alveda Sandifer med sinus rinse kit, Mucinex, Claritin/Zyrtec/Allegra, Flonase  Avoid decongestants if you have high blood pressure  You may use Tylenol (Acetaminophen) up to 3,000mg daily (in 24 hours) as needed for pain or fever  You may use Motrin (Ibuprofen) up to 800mg 3 times daily with food (in 24 hours) as needed for pain or fever

## 2022-03-22 NOTE — LETTER
March 22, 2022     Patient: Anais Harman   YOB: 1995   Date of Visit: 3/22/2022       To Whom it May Concern:    Anais Harman is under my professional care  She was seen in my office on 3/22/2022  She may return to work on 3/23/22  If you have any questions or concerns, please don't hesitate to call           Sincerely,          Mart Runner, DO

## 2022-05-24 ENCOUNTER — OFFICE VISIT (OUTPATIENT)
Dept: FAMILY MEDICINE CLINIC | Facility: CLINIC | Age: 27
End: 2022-05-24
Payer: COMMERCIAL

## 2022-05-24 VITALS
TEMPERATURE: 98 F | OXYGEN SATURATION: 99 % | WEIGHT: 228 LBS | BODY MASS INDEX: 36.64 KG/M2 | SYSTOLIC BLOOD PRESSURE: 110 MMHG | DIASTOLIC BLOOD PRESSURE: 76 MMHG | HEIGHT: 66 IN | HEART RATE: 64 BPM | RESPIRATION RATE: 14 BRPM

## 2022-05-24 DIAGNOSIS — E55.9 VITAMIN D DEFICIENCY: ICD-10-CM

## 2022-05-24 DIAGNOSIS — G47.00 INSOMNIA, UNSPECIFIED TYPE: ICD-10-CM

## 2022-05-24 DIAGNOSIS — F33.1 MODERATE EPISODE OF RECURRENT MAJOR DEPRESSIVE DISORDER (HCC): ICD-10-CM

## 2022-05-24 DIAGNOSIS — R53.83 FATIGUE, UNSPECIFIED TYPE: ICD-10-CM

## 2022-05-24 DIAGNOSIS — F41.9 ANXIETY: ICD-10-CM

## 2022-05-24 DIAGNOSIS — Z00.00 ANNUAL PHYSICAL EXAM: Primary | ICD-10-CM

## 2022-05-24 DIAGNOSIS — R73.03 PREDIABETES: ICD-10-CM

## 2022-05-24 PROBLEM — K81.0 ACUTE CHOLECYSTITIS: Status: RESOLVED | Noted: 2018-05-17 | Resolved: 2022-05-24

## 2022-05-24 PROCEDURE — 3725F SCREEN DEPRESSION PERFORMED: CPT | Performed by: FAMILY MEDICINE

## 2022-05-24 PROCEDURE — 1036F TOBACCO NON-USER: CPT | Performed by: FAMILY MEDICINE

## 2022-05-24 PROCEDURE — 99214 OFFICE O/P EST MOD 30 MIN: CPT | Performed by: FAMILY MEDICINE

## 2022-05-24 PROCEDURE — 99395 PREV VISIT EST AGE 18-39: CPT | Performed by: FAMILY MEDICINE

## 2022-05-24 PROCEDURE — 3008F BODY MASS INDEX DOCD: CPT | Performed by: FAMILY MEDICINE

## 2022-05-24 RX ORDER — HYDROXYZINE HYDROCHLORIDE 25 MG/1
25 TABLET, FILM COATED ORAL EVERY 6 HOURS PRN
Qty: 30 TABLET | Refills: 0 | Status: SHIPPED | OUTPATIENT
Start: 2022-05-24

## 2022-05-24 RX ORDER — CLONAZEPAM 0.5 MG/1
0.5 TABLET ORAL 2 TIMES DAILY PRN
Qty: 30 TABLET | Refills: 0 | Status: SHIPPED | OUTPATIENT
Start: 2022-05-24 | End: 2022-07-07 | Stop reason: SDUPTHER

## 2022-05-24 RX ORDER — FLUOXETINE 20 MG/1
40 TABLET, FILM COATED ORAL DAILY
Qty: 180 TABLET | Refills: 1 | OUTPATIENT
Start: 2022-05-24

## 2022-05-24 RX ORDER — FLUOXETINE 20 MG/1
40 TABLET, FILM COATED ORAL DAILY
Qty: 90 TABLET | Refills: 3 | Status: SHIPPED | OUTPATIENT
Start: 2022-05-24 | End: 2022-07-07 | Stop reason: SDUPTHER

## 2022-05-24 RX ORDER — TRAZODONE HYDROCHLORIDE 100 MG/1
TABLET ORAL
Qty: 90 TABLET | Refills: 1 | OUTPATIENT
Start: 2022-05-24

## 2022-05-24 NOTE — PROGRESS NOTES
Assessment/Plan:          Other Visit Diagnoses     Annual physical exam    - discussed healthy diet/exercise- advised to work on setting realistic goals like 15 mins three days a week for exercise to start, then increase  Check screening labs as noted  Declines std screening  Did have gyn visit recently  Continue routine gyn follow up, OCP use  Recommend booster Covid-19 vaccine- has had primary series  utd on Tdap    Relevant Orders    Comprehensive metabolic panel    Lipid Panel with Direct LDL reflex             Subjective:     Oziel Krause is a 28 y o  female here today with chief complaint below:  Chief Complaint   Patient presents with    Physical Exam     Would like to talk about medications      - CC above per clinical staff and reviewed  HPI:  Pt here for annual PE (this note) as well as concerns with mental health (second note)    She is struggling with worse depression/anxiety after a breakup around Christmastime with her long term boyfriend  See details in second note  Diet- not as healthy as she'd like  She had lost about 20 lbs but has regained some weight more recently  Some exercise- has a dog that she has to take out  Trying to get back on track w/ diet/exercise    Due for lab work  Had gyn visit recently  No concerns for std screening today  Had first two Covid-19 vaccines  Hasn't had booster  She has been eating on the go more the past two weeks  Notes she's had some painless mild ankle swelling intermittently, which I suspect may be related to increased sodium intake  None present today      The following portions of the patient's history were reviewed and updated as appropriate: allergies, current medications, past family history, past medical history, past social history, past surgical history and problem list     ROS:  Review of Systems       Objective:      /76   Pulse 64   Temp 98 °F (36 7 °C) (Temporal)   Resp 14   Ht 5' 6" (1 676 m)   Wt 103 kg (228 lb) SpO2 99%   BMI 36 80 kg/m²   BP Readings from Last 3 Encounters:   05/24/22 110/76   03/22/22 108/80   06/14/21 110/72     Wt Readings from Last 3 Encounters:   05/24/22 103 kg (228 lb)   03/22/22 107 kg (236 lb)   09/07/21 100 kg (221 lb)               Physical Exam:   Physical Exam  Physical Exam  Vitals and nursing note reviewed  Constitutional:       Appearance: Normal appearance  She is well-developed  She is not ill-appearing  HENT:      Head: Normocephalic and atraumatic  Eyes:      Conjunctiva/sclera: Conjunctivae normal    Neck:      Vascular: No carotid bruit  Cardiovascular:      Rate and Rhythm: Normal rate and regular rhythm  Heart sounds: Normal heart sounds  No murmur heard  Pulmonary:      Effort: Pulmonary effort is normal  No respiratory distress  Breath sounds: Normal breath sounds  No wheezing  Abdominal:      Palpations: Abdomen is soft  Tenderness: There is no abdominal tenderness  There is no guarding or rebound  Musculoskeletal:      Cervical back: Neck supple  Right lower leg: No edema  Left lower leg: No edema  Lymphadenopathy:      Cervical: No cervical adenopathy  Skin:     General: Skin is warm and dry  Neurological:      Mental Status: She is alert and oriented to person, place, and time     Psychiatric:         Behavior: Behavior normal       Comments: Affect somewhat flat

## 2022-05-24 NOTE — PROGRESS NOTES
Assessment/Plan:       Problem List Items Addressed This Visit        Other    Anxiety     See plan under depression           Relevant Medications    clonazePAM (KlonoPIN) 0 5 mg tablet    hydrOXYzine HCL (ATARAX) 25 mg tablet    FLUoxetine (PROzac) 20 MG tablet    Other Relevant Orders    TSH, 3rd generation with Free T4 reflex    Vitamin D deficiency     Check level w/ labs           Relevant Orders    Vitamin D 25 hydroxy    Prediabetes - Primary     Check a1c w next labs  Reviewed diet and exercise  Relevant Orders    CBC and differential    Hemoglobin A1C    Moderate episode of recurrent major depressive disorder (HCC)     Situational stressors  Increase prozac to 60 mg daily  Continue trazodone 100 mg at night  Continue clonazepam 0 5 mg twice a day with hydroxyzine 25 mg added for prn use  F/u in six weeks, sooner if any worsening of symptoms  Set up initial appointment with therapist in office  Relevant Medications    hydrOXYzine HCL (ATARAX) 25 mg tablet    FLUoxetine (PROzac) 20 MG tablet       Fatigue, unspecified type    - suspect related to difficulty sleeping  Check labs      Relevant Orders    CBC and differential    TSH, 3rd generation with Free T4 reflex    Vitamin D 25 hydroxy                 Subjective:     Conchita Brennan is a 28 y o  female here today with chief complaint below:  Chief Complaint   Patient presents with    Physical Exam     Would like to talk about medications      - CC above per clinical staff and reviewed  HPI:    Here for annual PE (separate note) as well as concern about mood/anxiety  She notes that her mood is not good  Broke up with long term boyfriend who she had lived with  Moved into apartment two weeks ago  Breakup was at [place], lived together until 3/1/22 which was difficult  Then was able to move in w/ parents for two months until her new apartment was ready 2 weeks ago  Not sleeping well    Having nightmares again    No thoughts of self-harm, no SI     Keeping up with work tasks  Has flexibility with her schedule, performance is not an issue  She finds she is struggling to get motivated so she will get to work later than she plans  Taking meds regularly  She is using clonazepam more than typical   She is using twice a day regularly, sometimes a third tablet for anxiety  The following portions of the patient's history were reviewed and updated as appropriate: allergies, current medications, past family history, past medical history, past social history, past surgical history and problem list     ROS:  Review of Systems       Objective:      /76   Pulse 64   Temp 98 °F (36 7 °C) (Temporal)   Resp 14   Ht 5' 6" (1 676 m)   Wt 103 kg (228 lb)   SpO2 99%   BMI 36 80 kg/m²   BP Readings from Last 3 Encounters:   05/24/22 110/76   03/22/22 108/80   06/14/21 110/72     Wt Readings from Last 3 Encounters:   05/24/22 103 kg (228 lb)   03/22/22 107 kg (236 lb)   09/07/21 100 kg (221 lb)               Physical Exam:   Physical Exam  Vitals and nursing note reviewed  Constitutional:       Appearance: Normal appearance  She is well-developed  She is not ill-appearing  HENT:      Head: Normocephalic and atraumatic  Eyes:      Conjunctiva/sclera: Conjunctivae normal    Neck:      Vascular: No carotid bruit  Cardiovascular:      Rate and Rhythm: Normal rate and regular rhythm  Heart sounds: Normal heart sounds  No murmur heard  Pulmonary:      Effort: Pulmonary effort is normal  No respiratory distress  Breath sounds: Normal breath sounds  No wheezing  Abdominal:      Palpations: Abdomen is soft  Tenderness: There is no abdominal tenderness  There is no guarding or rebound  Musculoskeletal:      Cervical back: Neck supple  Right lower leg: No edema  Left lower leg: No edema  Lymphadenopathy:      Cervical: No cervical adenopathy  Skin:     General: Skin is warm and dry     Neurological: Mental Status: She is alert and oriented to person, place, and time     Psychiatric:         Behavior: Behavior normal       Comments: Affect somewhat flat

## 2022-05-24 NOTE — TELEPHONE ENCOUNTER
Medication refill requested: prozac and desyrel  Last office visit: 3/2/22  Next office visit: today 5/24/22  Last refilled: 2/22/22 30 day supply x 3   Ordering Provider: [address] [place] [address] (select pharmacy send RX to):   [facility]/pharmacy #[id number] NAUN Rodas - [address] [facility] [address]  Phone: [phone] Fax: [phone] - patient has an appointment today

## 2022-05-24 NOTE — ASSESSMENT & PLAN NOTE
Situational stressors  Increase prozac to 60 mg daily  Continue trazodone 100 mg at night  Continue clonazepam 0 5 mg twice a day with hydroxyzine 25 mg added for prn use  F/u in six weeks, sooner if any worsening of symptoms  Set up initial appointment with therapist in office

## 2022-07-07 ENCOUNTER — OFFICE VISIT (OUTPATIENT)
Dept: FAMILY MEDICINE CLINIC | Facility: CLINIC | Age: 27
End: 2022-07-07
Payer: COMMERCIAL

## 2022-07-07 VITALS
DIASTOLIC BLOOD PRESSURE: 68 MMHG | TEMPERATURE: 97.4 F | HEART RATE: 107 BPM | BODY MASS INDEX: 36.13 KG/M2 | RESPIRATION RATE: 18 BRPM | OXYGEN SATURATION: 98 % | WEIGHT: 224.8 LBS | SYSTOLIC BLOOD PRESSURE: 100 MMHG | HEIGHT: 66 IN

## 2022-07-07 DIAGNOSIS — F41.9 ANXIETY: ICD-10-CM

## 2022-07-07 DIAGNOSIS — G47.00 INSOMNIA, UNSPECIFIED TYPE: ICD-10-CM

## 2022-07-07 DIAGNOSIS — F33.1 MODERATE EPISODE OF RECURRENT MAJOR DEPRESSIVE DISORDER (HCC): ICD-10-CM

## 2022-07-07 PROCEDURE — 3725F SCREEN DEPRESSION PERFORMED: CPT | Performed by: FAMILY MEDICINE

## 2022-07-07 PROCEDURE — 99214 OFFICE O/P EST MOD 30 MIN: CPT | Performed by: FAMILY MEDICINE

## 2022-07-07 RX ORDER — CLONAZEPAM 0.5 MG/1
0.5 TABLET ORAL 2 TIMES DAILY PRN
Qty: 30 TABLET | Refills: 0 | Status: SHIPPED | OUTPATIENT
Start: 2022-07-07 | End: 2022-10-20 | Stop reason: SDUPTHER

## 2022-07-07 RX ORDER — HYDROXYZINE HYDROCHLORIDE 25 MG/1
25 TABLET, FILM COATED ORAL EVERY 6 HOURS PRN
Qty: 30 TABLET | Refills: 0 | Status: CANCELLED | OUTPATIENT
Start: 2022-07-07

## 2022-07-07 RX ORDER — TRAZODONE HYDROCHLORIDE 50 MG/1
50 TABLET ORAL
Qty: 90 TABLET | Refills: 0 | Status: SHIPPED | OUTPATIENT
Start: 2022-07-07

## 2022-07-07 RX ORDER — FLUOXETINE 20 MG/1
60 TABLET, FILM COATED ORAL DAILY
Qty: 270 TABLET | Refills: 0 | Status: SHIPPED | OUTPATIENT
Start: 2022-07-07 | End: 2022-10-20

## 2022-07-07 NOTE — PROGRESS NOTES
Assessment/Plan:       Problem List Items Addressed This Visit        Other    Anxiety     See plan under depression           Relevant Medications    clonazePAM (KlonoPIN) 0 5 mg tablet    FLUoxetine (PROzac) 20 MG tablet    Insomnia     See plan under depression           Relevant Medications    traZODone (DESYREL) 50 mg tablet    Moderate episode of recurrent major depressive disorder (HCC)     Patient Instructions   Continue prozac 60 mg (three of the 20 mg pills) once daily  Decrease trazodone to 50 mg nightly for two weeks  If this doesn't make much difference, let me know and you stop it entirely  Once you are off trazodone, we can consider buspar  Continue clonazepam with a goal of no more than one per day or 30 per month  Keep the hydroxyzine on hand just in case you need it  It may be more effective once your anxiety is down a bit  Keep the therapy appointment for Monday  Follow up in one month  Relevant Medications    FLUoxetine (PROzac) 20 MG tablet    traZODone (DESYREL) 50 mg tablet                 Subjective:     Manjula Powers is a 28 y o  female here today with chief complaint below:  Chief Complaint   Patient presents with    Anxiety     No concerns      Medication Refill     Pending     HM     Did not get booster       - CC above per clinical staff and reviewed  HPI:  Pt here for f/u on depression and anxiety  Last visit 5/24, she was having worse mood and anxiety at that time- broke up with boyfriend around [place], lived w/ him until March and recently moved into new apartment in May  Pt notes that she is feeling more unsettled, more fear of things happening  After increasing medication at last visit felt a bit better, but then situational stressed increased  Parents moved  into parents' house  Her grandmother had been doing well physically, then just found out she has a large tumor the size of a football in her stomach  GM is having surgery today      Notes that prior to the situational stress w her GM, she was feeling a bit better  She is working, but doesn't want to do anything when she gets home  The mood/anxiety is starting to affect her daily life  She says twice she has been out of work clothes and needed to do laundry and then didn't want to do it  She is having a hard time getting up in the morning to go to work  Took a few days off from work two or three weeks ago, but then didn't want to get up and do anything, which was depressing  Doesn't want to cook, just eating frozen meals which is unusual for her  The trazodone isn't helping her sleep  Hydroxyzine doesn't seem to help  Trouble falling asleep, then hard to get up again  Up until midnight to 2 AM, then alarm goes off at 5:30 AM and she doesn't want to get up   Michael Paulino takes trazodone by 10 PM    Clonazepam- down to one a day and feels very anxious  She has family hx addiction (including fam hx addiction lorazepam) so she is hesitant to rely on clonazepam     The following portions of the patient's history were reviewed and updated as appropriate: allergies, current medications, past family history, past medical history, past social history, past surgical history and problem list     ROS:  Review of Systems     Objective:      /68   Pulse (!) 107   Temp (!) 97 4 °F (36 3 °C)   Resp 18   Ht 5' 6" (1 676 m)   Wt 102 kg (224 lb 12 8 oz)   SpO2 98%   BMI 36 28 kg/m²   BP Readings from Last 3 Encounters:   07/07/22 100/68   05/24/22 110/76   03/22/22 108/80     Wt Readings from Last 3 Encounters:   07/07/22 102 kg (224 lb 12 8 oz)   05/24/22 103 kg (228 lb)   03/22/22 107 kg (236 lb)               Physical Exam:   Physical Exam  Vitals and nursing note reviewed  Constitutional:       Appearance: Normal appearance  Neurological:      Mental Status: She is alert     Psychiatric:      Comments: Tearful at times, anxious

## 2022-07-07 NOTE — PATIENT INSTRUCTIONS
Continue prozac 60 mg (three of the 20 mg pills) once daily  Decrease trazodone to 50 mg nightly for two weeks  If this doesn't make much difference, let me know and you stop it entirely  Once you are off trazodone, we can consider buspar  Continue clonazepam with a goal of no more than one per day or 30 per month  Keep the hydroxyzine on hand just in case you need it  It may be more effective once your anxiety is down a bit  Keep the therapy appointment for Monday  Follow up in one month

## 2022-07-08 NOTE — ASSESSMENT & PLAN NOTE
Patient Instructions   Continue prozac 60 mg (three of the 20 mg pills) once daily  Decrease trazodone to 50 mg nightly for two weeks  If this doesn't make much difference, let me know and you stop it entirely  Once you are off trazodone, we can consider buspar  Continue clonazepam with a goal of no more than one per day or 30 per month  Keep the hydroxyzine on hand just in case you need it  It may be more effective once your anxiety is down a bit  Keep the therapy appointment for Monday  Follow up in one month

## 2022-07-11 ENCOUNTER — SOCIAL WORK (OUTPATIENT)
Dept: BEHAVIORAL/MENTAL HEALTH CLINIC | Facility: CLINIC | Age: 27
End: 2022-07-11
Payer: COMMERCIAL

## 2022-07-11 DIAGNOSIS — F33.1 MODERATE EPISODE OF RECURRENT MAJOR DEPRESSIVE DISORDER (HCC): Primary | ICD-10-CM

## 2022-07-11 PROCEDURE — 90791 PSYCH DIAGNOSTIC EVALUATION: CPT | Performed by: SOCIAL WORKER

## 2022-07-11 NOTE — PSYCH
Assessment/Plan:      There are no diagnoses linked to this encounter  Subjective:      Patient ID: Smiley Iniguez is a 28 y o  female presenting to this writer with ongoing depression and anxiety  Pt reports long hx of mental illness on both sides of her family  Pt reports her father was raised by his mother who was regularly hospitalized, and this led to his own problems growing up  Father dyllan by self medicating on alcohol  Pt reports she has a strained relationship with her father even though she still lives with him  Pt relates best to her paternal grandmother and can talk about anything with her  However, lately her grandmother has been more toxic and difficult to be around, also dementia onset which has limited how much quality time and quality conversation pt has with her grandmother  Pt relates well to her brother, but they are not super close  Pt reports she graduated college, and started working in retail  Pt reports this made her depressed and she got tired of dealing with customer complaints all the time, and staffing the stores was very difficult during the pandemic  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  Pt reports she initially enjoyed the job, but again because of staffing needs, she has ended up working overtime and she often will work 60 hours a week  Pt reports she knows this is not good for her, but she does not see an alternative at present  Pt reports while she works 60 hours a week, everything in her life is being put on the back burner  Pt reports she does not have many friends  Pt states she only really spends time with her family  Pt reports she is not in a relationship even though she be open to something romantically- recent long term relationship ended over tristen time  Pt sad afterwards  Pt reports she feels sad, depressed, overwhelmed, tired, anxious   Pt has been having elevated anxiety since the pandemic, and she is on medicine to try and manage these symptoms  Pt reports she is not sure if she is bipolar depressed, anxious  Pt states she is currently being treated for depression and anxiety, sleep issues  Medicine is moderately effective, but pt is still getting panic attacks and relying more on Benzo's  Pt reports she would like to be in regular therapy  Pt reports she feels like her current life is a reflection of some of her dysfunctional upbringing, negative thoughts process, and general fears, lack of trust in people  Pt admits going to work is an easier place for her to be, and it keep her mind from racing  Pt reports, for this reason, that work is her escape  Pt recognizes this is not healthy  Pt reports she feels her strained relationship with her father, and always trying to keep the peace in her household has influenced the way she is, and she would like to process this in therapy  Pt reports her general goals are to work to reduce depressive symptoms, improve sleep, and work on anxiety management techniques  Pt to follow up with this writer in 2 weeks time  HPI:     Pre-morbid level of function and History of Present Illness: Pt has always struggled with anxiety and she runs a high level of anxiety at baseline  Previous Psychiatric/psychological treatment/year: Pt was in therapy at the age of 7 and again when she had a DUI two years ago and had t be in forced treatment  Current Psychiatrist/Therapist: Pt has no current therapy  Outpatient and/or Partial and Other Community Resources Used (CTT, ICM, VNA): NA      Problem Assessment:     SOCIAL/VOCATION:  Family Constellation (include parents, relationship with each and pertinent Psych/Medical History):     Family History   Problem Relation Age of Onset    Anxiety disorder Mother     Hypertension Father     Thyroid cancer Father     Cancer Father     No Known Problems Brother     Suicide Attempts Paternal Grandmother         suicide attempts started age 45s   again age 79  ECT    Alcohol abuse Paternal Grandmother     Depression Paternal Grandmother     Mental illness Paternal Grandmother     Suicide Attempts Paternal Uncle         death by suicide    Mental illness Paternal Uncle     Completed Suicide  Paternal Uncle     Substance Abuse Neg Hx        Mother: Alive and doing ok  Spouse: Denies and not in a relatoinship   Father: Alive, pt does not have a good relationship with him  Children: Denies   Sibling: Brother, ok relationship with    Sibling: NA   Children: NA   Other: NA    Danyel Johnson relates best to paternal grandmother  she lives with her parents  she does not live alone  Domestic Violence: No past history of domestic violence    Additional Comments related to family/relationships/peer support: Pt reports ongoing stressors with family  Long hx of mental illness in both sides of her family  Father drinks alcohol to excess        School or Work History (strengths/limitations/needs): Pt graduated college  Her highest grade level achieved was College   history includes Denies    Financial status includes independent  Still living at home  LEISURE ASSESSMENT (Include past and present hobbies/interests and level of involvement (Ex: Group/Club Affiliations): denies  her primary language is [place]  Preferred language is [place]  Ethnic considerations are   Religions affiliations and level of involvement Synagogue   Does spirituality help you cope?  No    FUNCTIONAL STATUS: There has been a recent change in Danyel Johnson ability to do the following: NA    Level of Assistance Needed/By Whom?: KAYLA    Rina learns best by  reading, listening and demonstration    SUBSTANCE ABUSE ASSESSMENT: no substance abuse    Substance/Route/Age/Amount/Frequency/Last Use: NA    DETOX HISTORY: NA    Previous detox/rehab treatment: NA    HEALTH ASSESSMENT: PCP not notified     LEGAL: No Mental Health Advance Directive or Power of  on file    Prenatal History: Na    Delivery History: N/A    Developmental Milestones: N/A  Temperament as an infant was N/A  Temperament as a toddler was N/A  Temperament at school age was N/A  Temperament as a teenager was N/A  Risk Assessment:   The following ratings are based on my interview(s) with Pt    Risk of Harm to Self:   Demographic risk factors include , never  or  status and Jew  Historical Risk Factors include chronic psychiatric problems  Recent Specific Risk Factors include experienced fleeting ideation and diagnosis of depression   Additional Factors for a Child or Adolescent gender: female (more likely to attempt) and age over 13    Risk of Harm to Others:   Demographic Risk Factors include NA  Historical Risk Factors include NA  Recent Specific Risk Factors include NA    Access to Weapons:   Stephanie Plummer has access to the following weapons: denies  The following steps have been taken to ensure weapons are properly secured: no access    Based on the above information, the client presents the following risk of harm to self or others:  low    The following interventions are recommended:   no intervention changes    Notes regarding this Risk Assessment: Pt is not sucidal at present, future oriented           Review Of Systems:     Mood Normal   Behavior Normal    Thought Content Normal   General Relationship Problems and Emotional Problems   Personality Normal   Other Psych Symptoms Normal   Constitutional As Noted in HPI   ENT As Noted in HPI   Cardiovascular As Noted in HPI   Respiratory As Noted in HPI   Gastrointestinal As Noted in HPI   Genitourinary As Noted in HPI   Musculoskeletal As Noted in HPI   Integumentary As Noted in HPI   Neurological As Noted in HPI   Endocrine Normal          Mental status:  Appearance calm and cooperative  and adequate hygiene and grooming   Mood euthymic and mood appropriate   Affect affect appropriate    Speech a normal rate and fluent   Thought Processes coherent/organized and normal thought processes   Hallucinations no hallucinations present    Thought Content no delusions   Abnormal Thoughts no suicidal thoughts  and no homicidal thoughts    Orientation  oriented to person and place and time   Remote Memory short term memory intact   Attention Span concentration intact   Intellect Appears to be of Average Intelligence   Fund of Knowledge displays adequate knowledge of current events   Insight Insight intact   Judgement judgment was intact   Muscle Strength Muscle strength and tone were normal   Language no difficulty naming common objects   Pain none   Pain Scale 0

## 2022-07-29 ENCOUNTER — SOCIAL WORK (OUTPATIENT)
Dept: BEHAVIORAL/MENTAL HEALTH CLINIC | Facility: CLINIC | Age: 27
End: 2022-07-29
Payer: COMMERCIAL

## 2022-07-29 DIAGNOSIS — F31.81 BIPOLAR 2 DISORDER (HCC): Primary | ICD-10-CM

## 2022-07-29 PROCEDURE — 90834 PSYTX W PT 45 MINUTES: CPT | Performed by: SOCIAL WORKER

## 2022-07-29 NOTE — PSYCH
1  Bipolar 2 disorder ([facility]         Data: Pt and this writer discussed in more detail pt's upbringing  Pt opened up more about how she was raised  Father decided to become his own manager of money in his 42's and did very well  However, this meant mother had to go back to work to provide the medical coverage, which mother was always resentful about  Father's family has long line of mental health disorders, and pt thinks that her father did not like women much due to his own difficult relationship with his mother  Pt states her father always treated women differently than the men in his life  Pt reports her brother had his education paid for and father bought him his first car  By contrast, pt's father did not spend money on her education and she had to buy everything herself  Pt reports this brings up a lot of frustration and trauma  Pt reports as she got older, she just coped by finding ways to distract herself  Lately pt's main coping mechanisms has been working to excess  Pt feels this occupies her mind, and prevents her from ruminating too much on the past  However, lately pt has still been struggling with coping with the stressors in her life  Pt reports she went back to live with her parents after break up with boyfriend in January  Pt reports living with her family     Pt reports she has also been struggling with sleep more than normal  This led to pt and this writer discussed her diagnosis of depression, and how the medications are not really helping her with her mood  This writer asked pt if she had ever had a manic episode, and pt said yes, and endorses almost all of the symptoms of bipolar 2 disorder  Pt is quite shocked at realizing she probably has a similar condition to he grandmother  Pt feels relieved also, as she has been wondering why she struggles with the things she struggles with   Pt opened up more about blowing up relationships- explosive anger issues, emptying her bank account on shopping charla, blowing her credit up in her early 18's, making very poor decisions with relationships, cheating on partners and then lying about it etc  Pt reports she has never really opened up about this stuff before, and just talking about it brings a lot of relief and the chance to start to work on improving her life, and being more successful  Pt reports she wants to also work on some of the trauma she has been through, and finding ways to build trusting relationships  Pt reports she would also like to work on ways to manage the manic episodes so she can avoid the kinds of mistakes she has made in the past      Assessment: It appears pt endorses much of the criteria for Bipolar 2 disorder  Risk taking- spending and sexual, decreased need for sleep, agitation, mood lability, high energy during hypomania, increased activities, excess confidence, not as worried about consequences  Likely, pt also has PTSD symptoms, and anxiety secondary to life stressors and events, and managing the effects of manic and depressive episodes  Pt and this writer can now collaberate a little better on working through a plan for self care, and self awareness in future sessions  Plan: follow up in 2 weeks time  Psychotherapy Provided: Individual Psychotherapy 45 minutes     Length of time in session: 45 minutes, follow up in 2 weeks time  Goals addressed in session: Goal 1     Pain:      none    0    Current suicide risk : Low     Pt is future oriented and not suicidal        Behavioral Health Treatment Plan [facility]: Diagnosis and Treatment Plan explained to Anuradha Sena relates understanding diagnosis and is agreeable to Treatment Plan   Yes

## 2022-08-03 ENCOUNTER — PATIENT MESSAGE (OUTPATIENT)
Dept: FAMILY MEDICINE CLINIC | Facility: CLINIC | Age: 27
End: 2022-08-03

## 2022-08-03 DIAGNOSIS — R06.2 WHEEZING: Primary | ICD-10-CM

## 2022-08-04 RX ORDER — ALBUTEROL SULFATE 90 UG/1
2 AEROSOL, METERED RESPIRATORY (INHALATION) EVERY 4 HOURS PRN
Qty: 18 G | Refills: 1 | Status: SHIPPED | OUTPATIENT
Start: 2022-08-04

## 2022-08-04 NOTE — PATIENT COMMUNICATION
Medication refill requested: albuterol (PROVENTIL HFA,VENTOLIN HFA) 90 mcg/act inhaler  Last office visit: 07/07/2022  Next office visit: none  Last refilled: Unknown   Labs: No  Ordering Provider: Historical       Pharmacy (select pharmacy send RX to):   [id number] [place] [facility] [id number] [address]  Phone: [phone] Fax: [phone]

## 2022-09-16 ENCOUNTER — SOCIAL WORK (OUTPATIENT)
Dept: BEHAVIORAL/MENTAL HEALTH CLINIC | Facility: CLINIC | Age: 27
End: 2022-09-16
Payer: COMMERCIAL

## 2022-09-16 DIAGNOSIS — F31.81 BIPOLAR 2 DISORDER (HCC): Primary | ICD-10-CM

## 2022-09-16 PROCEDURE — 90834 PSYTX W PT 45 MINUTES: CPT | Performed by: SOCIAL WORKER

## 2022-09-19 NOTE — PSYCH
1  [facility]         Data: Pt reports having a manic episode during a trip to the beach with her friends  Pt reports with her new awareness of Bipolar diagnosis, she is now connecting the dots with her ongoing patterns of manic episodes followed by depressive slumps  Pt reports while on vacation she spent a large amount of money, and her general patterns of behavior is she will do what she wants to do, nobody will  her way, and she has no regard for the consequences of her action  Pt reports she tends to live by her emotions so when she feels full of energy, only needs a tiny amount of sleep, she feels she is unstoppable  Pt recognizes this is in her own eyes, and she often stream rolls over other people's thoughts and emotions  Pt is not proud of this, and she recognizes the disruption this has caused her over the last 10 years  Pt is also aware she has very little accountability in her life, and left to her own decisions she makes poor choices  Pt reports work is her safe space, and allows her to function much better  Pt reports she has the ongoing accountability of not wanting to lose her job, and the importance of having income as her parents are not going to bail her out  Pt reports she has managed to survive this far, by having a constant fear of losing her housing, or losing her job  This writer and pt discussed building insight regarding the onset of manic symptoms, and the importance of being led by healthy thinking, rather than on emotions  Pt is encouraged to see if she can identify some kind of plan for these episodes where she chooses to reign herself in a little, and identify what she is doing during manic episodes that cause her long term pain, and ultimately increase the depressive episode which follow  Pt is encouraged to try and identify ways she can increase accountability in her life  Assessment: Pt's insight is growing   Pt is benefiting from understanding she is going through manic and depressive episodes, and pt is not using this as an excuse but she is far more forgiving of self than she used to be  Pt reports her sleep is still very poor, and she does struggle with low self worth  Pt reports she is ready to tackle this head on, and this writer feels pt is very invested in the process she is prepared to go through  Plan: Come up with a crisis plan to identify healthy adaptive strategies to manage savanah, and also to manage the depressive slumps which often follow  Psychotherapy Provided: Individual Psychotherapy 45 minutes     Length of time in session: 45 minutes, follow up in 2 weeks time    Goals addressed in session: Goal 1     Pain:      none    0    Current suicide risk : Low     Pt is future oriented and not suicidal        Behavioral Health Treatment Plan [facility]: Diagnosis and Treatment Plan explained to Jade Godoy relates understanding diagnosis and is agreeable to Treatment Plan   Yes

## 2022-09-23 NOTE — ASSESSMENT & PLAN NOTE
Decrease the effexor xr to 150 mg daily x 2 weeks  Then decrease to 75 mg daily x 2 weeks  Opioid Counseling: I discussed with the patient the potential side effects of opioids including but not limited to addiction, altered mental status, and depression. I stressed avoiding alcohol, benzodiazepines, muscle relaxants and sleep aids unless specifically okayed by a physician. The patient verbalized understanding of the proper use and possible adverse effects of opioids. All of the patient's questions and concerns were addressed. They were instructed to flush the remaining pills down the toilet if they did not need them for pain.

## 2022-10-20 ENCOUNTER — OFFICE VISIT (OUTPATIENT)
Dept: FAMILY MEDICINE CLINIC | Facility: CLINIC | Age: 27
End: 2022-10-20
Payer: COMMERCIAL

## 2022-10-20 ENCOUNTER — SOCIAL WORK (OUTPATIENT)
Dept: BEHAVIORAL/MENTAL HEALTH CLINIC | Facility: CLINIC | Age: 27
End: 2022-10-20
Payer: COMMERCIAL

## 2022-10-20 VITALS
HEART RATE: 94 BPM | SYSTOLIC BLOOD PRESSURE: 112 MMHG | OXYGEN SATURATION: 96 % | BODY MASS INDEX: 36.58 KG/M2 | TEMPERATURE: 97.5 F | WEIGHT: 227.6 LBS | HEIGHT: 66 IN | DIASTOLIC BLOOD PRESSURE: 78 MMHG | RESPIRATION RATE: 16 BRPM

## 2022-10-20 DIAGNOSIS — F41.9 ANXIETY: ICD-10-CM

## 2022-10-20 DIAGNOSIS — F31.81 BIPOLAR 2 DISORDER (HCC): Primary | ICD-10-CM

## 2022-10-20 DIAGNOSIS — F33.1 MODERATE EPISODE OF RECURRENT MAJOR DEPRESSIVE DISORDER (HCC): ICD-10-CM

## 2022-10-20 DIAGNOSIS — G43.909 MIGRAINE WITHOUT STATUS MIGRAINOSUS, NOT INTRACTABLE, UNSPECIFIED MIGRAINE TYPE: ICD-10-CM

## 2022-10-20 PROCEDURE — 99214 OFFICE O/P EST MOD 30 MIN: CPT | Performed by: FAMILY MEDICINE

## 2022-10-20 PROCEDURE — 90834 PSYTX W PT 45 MINUTES: CPT | Performed by: SOCIAL WORKER

## 2022-10-20 RX ORDER — RIZATRIPTAN BENZOATE 5 MG/1
5 TABLET, ORALLY DISINTEGRATING ORAL AS NEEDED
Qty: 9 TABLET | Refills: 1 | Status: SHIPPED | OUTPATIENT
Start: 2022-10-20

## 2022-10-20 RX ORDER — CLONAZEPAM 0.5 MG/1
0.5 TABLET ORAL 2 TIMES DAILY PRN
Qty: 30 TABLET | Refills: 0 | Status: SHIPPED | OUTPATIENT
Start: 2022-10-20

## 2022-10-20 RX ORDER — FLUOXETINE 20 MG/1
TABLET, FILM COATED ORAL
Refills: 0
Start: 2022-10-20 | End: 2022-11-10

## 2022-10-20 NOTE — PATIENT INSTRUCTIONS
Decrease prozac to 40 mg daily x 1 week, then 20 mg daily x 2 weeks (unless otherwise directed)      Psychiatrists in the [facility]  Delaney Styles 78, [address]     [address] Tel  [phone] health services: [phone]     2813 [facility] 83   [address] [facility] [address], [facility] 32 [address] [facility] [address], [place] [phone]     Dr Ruby Dolan , Children and Adults  [address]  3326 [facility], [facility], [place], [address]  [phone]     Dr Ana Mcmullen  4750 [facility], [facility] 3 [phone]     JLZUNMSPY MFQJYTJIHH - adults and geriatrics   [address], [facility], [address] [facility] [phone]     [facility] (adults and children) [address]   100 [facility], [place], [address]  [phone]              [facility] (adults only) [phone]     Dr Gisele Upton,  119 [facility] 21      Dr Rolly Herrera MD , [phone]  Dr Susanne Mccall MD , [phone]  Dr Jitendra Forrest MD , [phone]  Dr Guillermo Sierra MD , [phone]  Dr Barber Clifford MD , [phone]  [facility] 1452, [phone]  [facility], [phone] 620 [facility] [phone]  [address] [phone] [facility])  [address] [phone] [facility] [phone]  [facility] 01 [phone] 83 Counseling[facility]Psychiatry Group [phone]     [address] (**may take Medicaid)  [address] [facility] [phone]  1st floor, [address] [address] [id number] [place] [address], [facility] MH/MR [phone]  2130 [facility] [phone]     79 [facility] [phone] health services: [phone] 800 [facility]  [address], 200 [facility] 130 4030 MH/ [phone]  [phone] -235 [facility]  [address], 793 [facility] on   -[address] [facility] [address]  222 [facility] [phone]     Yannick Johansen MD  Child Psychiatry  [phone] 3100 [facility], [address] [facility] Adult and Pediatric [address] [place], [place] [address]   [facility] Consultation Liaison [address] [place], [place] [address] [phone]     [facility] Adult and Pediatric Psychiatry-Marie   [address] [place] [address]   [address] Adult Psychiatry-Marie   [address]    [address] [place], [place] [address] [phone]     [facility] Cynthia Latif 50   6154 [facility], [address] [place] [address],[address] [address]   [address] Jenkins 83  [address]   [place], [place]  [address] [phone]

## 2022-10-20 NOTE — ASSESSMENT & PLAN NOTE
Concern that prozac is precipitating manic episodes  Decrease prozac to 40 mg daily x 7 d, then 20 mg for 14 days  Refer for IOP/partial program   If she is able to get a psychiatry appointment sooner, ok to hold on IOP, but I am concerned with the acute savanah she has experienced  Continue therapist visits

## 2022-10-20 NOTE — ASSESSMENT & PLAN NOTE
Refilled clonazepam for prn use  Controlled Substance Review    [place] or [place]  reviewed: No red flags were identified; safe to proceed with prescription  Yamil Rutledge

## 2022-10-20 NOTE — ASSESSMENT & PLAN NOTE
Prescription maxalt ordered  She will try this with otc nsaid for migraine acute treatment  She will let me know how this is working for her

## 2022-10-20 NOTE — PROGRESS NOTES
Assessment/Plan:       Problem List Items Addressed This Visit        Cardiovascular and Mediastinum    Migraine without status migrainosus, not intractable     Prescription maxalt ordered  She will try this with otc nsaid for migraine acute treatment  She will let me know how this is working for her  Relevant Medications    clonazePAM (KlonoPIN) 0 5 mg tablet    rizatriptan (Maxalt-MLT) 5 mg disintegrating tablet    FLUoxetine (PROzac) 20 MG tablet       Other    Anxiety     Refilled clonazepam for prn use  Cont[place] Sub[place]    [place]  reviewed: No red flags were identified; safe to proceed with prescription                Relevant Medications    clonazePAM (KlonoPIN) 0 5 mg tablet    FLUoxetine (PROzac) 20 MG tablet    Moderate episode of recurrent major depressive disorder (HCC)    Relevant Medications    FLUoxetine (PROzac) 20 MG tablet    Bipolar 2 disorder ([address] ) - Primary     Concern that prozac is precipitating manic episodes  Decrease prozac to 40 mg daily x 7 d, then 20 mg for 14 days  Refer for IOP/partial program   If she is able to get a psychiatry appointment sooner, ok to hold on IOP, but I am concerned with the acute savanah she has experienced  Continue therapist visits  Relevant Medications    FLUoxetine (PROzac) 20 MG tablet    Other Relevant Orders    Ambulatory Referral to [facility] or Partial Psych Program             Subjective:     Manjula Powers is a 28 y o  female here today with chief complaint below:  Chief Complaint   Patient presents with   • Anxiety     Pt was seen by Margarito Au, who recommended she be seen for med adjustments at this time for her anx/dep  - CC above per clinical staff and reviewed  HPI:  Pt here for f/u on mood  She is seeing Mary Muller in our office for therapy who has suggested she likely has bipolar d/o with episodes of hypomania  She notes she is feeling more of the hypomania with the prozac   Not sleeping well   She went away end of August on vacation and was engaging in risky behavior, not sleeping, excessive drinking, etc- the came home and crashed for three days  More irritable, more agitated  Feels moods vary without warning  Her anxiety has been high  Has only slept about 10 hours over the past week  Feels anxious and shaky  Hands are shaky  Feels that now her job is being affected  Is needing to take days off or work from home then will sleep all day  Tries to avoid clonazepam but has been more panicky    She also notes she is getting more migraines  Has a hx of migraines, worse with stress  Now getting worse  Feels pain on one side, then will feel it worse  Has never been on prescription meds  Ibuprofen and excedrin  Worse w decreased sleep and with the subsequent increase in caffeine- now about 40 oz a day coffee if not more  Does get nausea, photophobia, phonophobia with the migraines  No aura    The following portions of the patient's history were reviewed and updated as appropriate: allergies, current medications, past family history, past medical history, past social history, past surgical history and problem list     ROS:  Review of Systems     Objective:      /78   Pulse 94   Temp 97 5 °F (36 4 °C)   Resp 16   Ht 5' 6" (1 676 m)   Wt 103 kg (227 lb 9 6 oz)   SpO2 96%   BMI 36 74 kg/m²   BP Readings from Last 3 Encounters:   10/20/22 112/78   07/07/22 100/68   05/24/22 110/76     Wt Readings from Last 3 Encounters:   10/20/22 103 kg (227 lb 9 6 oz)   07/07/22 102 kg (224 lb 12 8 oz)   05/24/22 103 kg (228 lb)               Physical Exam:   Physical Exam  Vitals and nursing note reviewed  Constitutional:       Appearance: Normal appearance  She is not ill-appearing  Eyes:      Extraocular Movements: Extraocular movements intact  Conjunctiva/sclera: Conjunctivae normal    Neck:      Vascular: No carotid bruit     Cardiovascular:      Rate and Rhythm: Normal rate and regular rhythm  Heart sounds: No murmur heard  Pulmonary:      Effort: Pulmonary effort is normal       Breath sounds: Normal breath sounds  No wheezing or rhonchi  Lymphadenopathy:      Cervical: No cervical adenopathy  Neurological:      Mental Status: She is alert     Psychiatric:      Comments: anxious

## 2022-10-21 NOTE — PSYCH
1  Bipolar 2 disorder ([facility] [address] )       Data: Pt reports recent manic episode lasting 5 days  Only about 10 hours of sleep  Pt calling ex boyfriends, spending lots of money  Pt reports she then fell into a depressive slump which she is in now  Pt less motivated going to work  House is a mess  Pt and this writer talked about PCP recommendation of pt getting a psychiatric eval, and having a fresh look at her medications  Pt and this writer discussed strategies to improve pt's success  Pt admits she is following a similar trend in her life to her grandmother who has the same mental health concerns  At a recent family renunion, pt also found out the presence of bipolar disorder is much more prevalent in her wider family than she realized, it was just never discussed in her nuclear family  Pt and this writer talked about having a written down strategy for the times when pt is noticing she is entering a manic phase, or a depressive phase  Pt and this writer revisited the importance of accountability, and how pt is struggling to find ways to keep herself in accountable relationships as she does not want to use her family because of shame and because of childhood disappointments  Pt knows she needs greater accountability with her spending and is going to work on finding ways to improve this areas of her life  Pt also reports her house often gets into a mess  Pt reports she is going to start to invite friends on a regular basis to her house, and pt feels this will keep her more accountable to maintaining her apartment in a clean and orderly fashion  Pt and this writer also discussed the importance of pt not feeling like she is failing but the importance of working in therapy and medication management to find a way to improve success  Assessment: Pt is opening up more about manic episodes, and pt is showing improved insight and a willingness to work on things, and open up about what is going on in her life   Pt is [facility] [place] upon PCP recommendation  This shows good insight  Plan: Follow up in 3 weeks time  Psychotherapy Provided: Individual Psychotherapy 45 minutes     Length of time in session: 45 minutes from 4:05 to 4:50, follow up in 3 weeks time  Goals addressed in session: Goal 1     Pain:      none    0    Current suicide risk : Low     Pt is future oriented and not suicidal        Behavioral Health Treatment Plan [facility]: Diagnosis and Treatment Plan explained to Cassi Cuevas relates understanding diagnosis and is agreeable to Treatment Plan   Yes

## 2022-10-24 ENCOUNTER — TELEPHONE (OUTPATIENT)
Dept: PSYCHOLOGY | Facility: CLINIC | Age: 27
End: 2022-10-24

## 2022-12-14 ENCOUNTER — TELEPHONE (OUTPATIENT)
Dept: FAMILY MEDICINE CLINIC | Facility: CLINIC | Age: 27
End: 2022-12-14

## 2022-12-14 ENCOUNTER — OFFICE VISIT (OUTPATIENT)
Dept: URGENT CARE | Age: 27
End: 2022-12-14

## 2022-12-14 VITALS
TEMPERATURE: 97.4 F | SYSTOLIC BLOOD PRESSURE: 124 MMHG | RESPIRATION RATE: 12 BRPM | HEART RATE: 99 BPM | DIASTOLIC BLOOD PRESSURE: 77 MMHG | OXYGEN SATURATION: 98 %

## 2022-12-14 DIAGNOSIS — R68.89 FLU-LIKE SYMPTOMS: Primary | ICD-10-CM

## 2022-12-14 RX ORDER — BENZONATATE 200 MG/1
200 CAPSULE ORAL 3 TIMES DAILY PRN
Qty: 20 CAPSULE | Refills: 0 | Status: SHIPPED | OUTPATIENT
Start: 2022-12-14

## 2022-12-14 NOTE — TELEPHONE ENCOUNTER
Patient called she is having fever, chest congestion, cough, and ear pain  I spoke with Dr Sylvester Garner and per her instructions sent the patient to Urgent Care because she wants the patient flu swabbed    Patient was ok with those instructions and will head over to Urgent care now

## 2022-12-14 NOTE — PROGRESS NOTES
[employer]        NAME: Smiley Iniguez is a 28 y o  female  : 1995    MRN: [id number]  DATE: 2022  TIME: 6:33 PM    Assessment and Plan   Flu-like symptoms [R68 89]  1  Flu-like symptoms  Cov/Flu-Collected at [facility] or [facility] Now    benzonatate (TESSALON) 200 MG capsule            Patient Instructions     Take med as prescribed  Covid and flu tested; results in 1-2 days  Cont with nyquil and dayquil  Follow up with PCP in 3-5 days  Proceed to  ER if symptoms worsen  Chief Complaint     Chief Complaint   Patient presents with   • Cough     Crackly sounding cough   • Fever     High of 102   • Nasal Congestion   • Sore Throat   • Generalized Body Aches     Started Thursday         History of Present Illness       HPI   Reports cold symptoms  Includes cough, fever, nasal congestion and bodyaches  Covid tests x 2 at home was negative  Took some tylenol for aches and pain  Review of Systems   Review of Systems   Constitutional: Positive for fever  HENT: Positive for congestion, rhinorrhea and sore throat  Respiratory: Positive for cough  Negative for chest tightness, shortness of breath and wheezing  Cardiovascular: Negative for chest pain  Gastrointestinal: Negative for diarrhea and vomiting  Musculoskeletal: Positive for myalgias           Current Medications       Current Outpatient Medications:   •  benzonatate (TESSALON) 200 MG capsule, Take 1 capsule (200 mg total) by mouth 3 (three) times a day as needed for cough, Disp: 20 capsule, Rfl: 0  •  albuterol (PROVENTIL HFA,VENTOLIN HFA) 90 mcg/act inhaler, Inhale 2 puffs every 4 (four) hours as needed for wheezing, Disp: 18 g, Rfl: 1  •  ascorbic acid (VITAMIN C) 500 MG tablet, Take 500 mg by mouth daily, Disp: , Rfl:   •  clonazePAM (KlonoPIN) 0 5 mg tablet, Take 1 tablet (0 5 mg total) by mouth 2 (two) times a day as needed for anxiety, Disp: 30 tablet, Rfl: 0  •  FLUoxetine (PROzac) 20 MG tablet, Take 2 tablets (40 mg total) by mouth daily for 7 days, THEN 1 tablet (20 mg total) daily for 14 days  , Disp: , Rfl: 0  •  hydrOXYzine HCL (ATARAX) 25 mg tablet, Take 1 tablet (25 mg total) by mouth every 6 (six) hours as needed for itching, Disp: 30 tablet, Rfl: 0  •  Multiple Vitamin (multivitamin) tablet, Take 1 tablet by mouth daily, Disp: , Rfl:   •  norethindrone-ethinyl estradiol-ferrous fumarate (LOESTIN 24 FE) 1-20 MG-MCG(24) per tablet, Take 1 tablet by mouth daily, Disp: , Rfl:   •  rizatriptan (Maxalt-MLT) 5 mg disintegrating tablet, Take 1 tablet (5 mg total) by mouth as needed for migraine Take at the onset of migraine; if symptoms continue or return, may take another dose at least 2 hours after first dose   Take no more than 2 doses in a day , Disp: 9 tablet, Rfl: 1  •  spironolactone (ALDACTONE) 100 mg tablet, Take 100 mg by mouth 2 (two) times a day, Disp: , Rfl:   •  traZODone (DESYREL) 50 mg tablet, Take 1 tablet (50 mg total) by mouth daily at bedtime, Disp: 90 tablet, Rfl: 0    Current Allergies     Allergies as of 12/14/2022 - Reviewed 12/14/2022   Allergen Reaction Noted   • Other Allergic Rhinitis 06/05/2013            The following portions of the patient's history were reviewed and updated as appropriate: allergies, current medications, past family history, past medical history, past social history, past surgical history and problem list      Past Medical History:   Diagnosis Date   • Allergic    • Anxiety    • Depression     Last assessed 6/5/2013   • Disease of thyroid gland    • Eating disorder    • GERD (gastroesophageal reflux disease)        Past Surgical History:   Procedure Laterality Date   • CHOLECYSTECTOMY     • CHOLECYSTECTOMY LAPAROSCOPIC N/A 5/18/2018    Procedure: CHOLECYSTECTOMY LAPAROSCOPIC;  Surgeon: Anil Kenyon MD;  Location: [facility] OR;  Service: General   • TONSILLECTOMY      With Adenoidectomy       Family History   Problem Relation Age of Onset   • Anxiety disorder Mother    • Hypertension Father    • Thyroid cancer Father    • Cancer Father    • No Known Problems Brother    • Suicide Attempts Paternal Grandmother         suicide attempts started age 45s  again age 79  ECT   • Alcohol abuse Paternal Grandmother    • Depression Paternal Grandmother    • Mental illness Paternal Grandmother    • Dementia Paternal Grandmother    • Cancer Paternal Grandmother    • Anxiety disorder Paternal Grandmother    • Psychiatric Illness Paternal Grandmother    • Psychosis Paternal Grandmother    • Suicide Attempts Paternal Uncle         death by suicide   • Mental illness Paternal Uncle    • Completed Suicide  Paternal Uncle    • Depression Paternal Uncle    • Anxiety disorder Paternal Uncle    • Psychiatric Illness Paternal Uncle    • Alcohol abuse Maternal Grandfather    • Dementia Maternal Grandfather    • Substance Abuse Neg Hx          Medications have been verified  Objective   /77 (BP Location: Left arm, Patient Position: Sitting, Cuff Size: Large)   Pulse 99   Temp (!) 97 4 °F (36 3 °C) (Temporal)   Resp 12   SpO2 98%   No LMP recorded         Physical Exam     Physical Exam

## 2022-12-15 LAB
FLUAV RNA RESP QL NAA+PROBE: NEGATIVE
FLUBV RNA RESP QL NAA+PROBE: NEGATIVE
SARS-COV-2 RNA RESP QL NAA+PROBE: NEGATIVE

## 2023-04-24 ENCOUNTER — OFFICE VISIT (OUTPATIENT)
Dept: FAMILY MEDICINE CLINIC | Facility: CLINIC | Age: 28
End: 2023-04-24

## 2023-04-24 VITALS
HEIGHT: 66 IN | WEIGHT: 232.8 LBS | RESPIRATION RATE: 16 BRPM | OXYGEN SATURATION: 98 % | DIASTOLIC BLOOD PRESSURE: 86 MMHG | BODY MASS INDEX: 37.41 KG/M2 | HEART RATE: 100 BPM | SYSTOLIC BLOOD PRESSURE: 116 MMHG | TEMPERATURE: 97.5 F

## 2023-04-24 DIAGNOSIS — E66.9 OBESITY (BMI 35.0-39.9 WITHOUT COMORBIDITY): ICD-10-CM

## 2023-04-24 DIAGNOSIS — R20.2 PARESTHESIAS: ICD-10-CM

## 2023-04-24 DIAGNOSIS — E66.01 SEVERE OBESITY (BMI 35.0-39.9) WITH COMORBIDITY (HCC): ICD-10-CM

## 2023-04-24 DIAGNOSIS — F41.9 ANXIETY: Primary | ICD-10-CM

## 2023-04-24 DIAGNOSIS — R07.9 CHEST PAIN, UNSPECIFIED TYPE: ICD-10-CM

## 2023-04-24 DIAGNOSIS — G47.00 INSOMNIA, UNSPECIFIED TYPE: ICD-10-CM

## 2023-04-24 DIAGNOSIS — F33.1 MODERATE EPISODE OF RECURRENT MAJOR DEPRESSIVE DISORDER (HCC): ICD-10-CM

## 2023-04-24 DIAGNOSIS — F31.81 BIPOLAR 2 DISORDER (HCC): ICD-10-CM

## 2023-04-24 RX ORDER — QUETIAPINE FUMARATE 100 MG/1
TABLET, FILM COATED ORAL
Qty: 90 TABLET | Refills: 0 | Status: SHIPPED | OUTPATIENT
Start: 2023-04-24

## 2023-04-24 NOTE — ASSESSMENT & PLAN NOTE
Uncontrolled  Patient is considering scheduling appointment with Psych Dr Marie Toribio and reconnecting with counselor Beverly Pineda LCSW  Start Seroquel 300mg QHS  Declines partial inpatient psych

## 2023-04-24 NOTE — ASSESSMENT & PLAN NOTE
Uncontrolled  Patient is considering scheduling appointment with Psych Dr Jaciel Akhtar and reconnecting with counselor Jose Ramon Higgins LCSW  Start Seroquel 300mg QHS  Declines partial inpatient psych

## 2023-04-24 NOTE — ASSESSMENT & PLAN NOTE
Uncontrolled  Patient is considering scheduling appointment with Psych Dr Quiana Silvestre and reconnecting with counselor Lila Meier, MAYTEW  Start Seroquel 300mg QHS  Declines partial inpatient psych

## 2023-04-24 NOTE — PROGRESS NOTES
Assessment/Plan:  Problem List Items Addressed This Visit        Other    Anxiety - Primary     Uncontrolled  Patient is considering scheduling appointment with Psych Dr Maryann Mejia and reconnecting with counselor Alana Licona LCSW  Start Seroquel 300mg QHS  Declines partial inpatient psych  Relevant Medications    QUEtiapine (SEROquel) 100 mg tablet    Other Relevant Orders    Comprehensive metabolic panel    Magnesium    Insomnia     Uncontrolled  Patient is considering scheduling appointment with Psych Dr Maryann Mejia and reconnecting with counselor Alana Licona LCSW  Start Seroquel 300mg QHS  Declines partial inpatient psych  Moderate episode of recurrent major depressive disorder (HCC)     Uncontrolled  Patient is considering scheduling appointment with Psych Dr Maryann Mejia and reconnecting with counselor Alana Licona LCSW  Start Seroquel 300mg QHS  Declines partial inpatient psych  Relevant Medications    QUEtiapine (SEROquel) 100 mg tablet    Bipolar 2 disorder (HCC)     Uncontrolled  Patient is considering scheduling appointment with Psych Dr Maryann Mejia and reconnecting with counselor Alana Licona LCSW  Start Seroquel 300mg QHS  Declines partial inpatient psych  Relevant Medications    QUEtiapine (SEROquel) 100 mg tablet   Other Visit Diagnoses     Obesity (BMI 35 0-39 9 without comorbidity)        Recommend lifestyle modifications  Severe obesity (BMI 35 0-39  9) with comorbidity ([address] )        Chest pain, unspecified type        Relevant Orders    POCT ECG (Completed)    Stable EKG  Symptoms likely due to anxiety  Normal sinus rhythm at 77 bpm   Normal axis and normal intervals  No acute ST elevation or depression  T wave inversion in 3  No changes compared to prior EKG 6/13/2021  Paresthesias        Relevant Orders    Vitamin B12    Folate    Magnesium    Pending labs          BMI 37 0-37 9, adult            Normal sinus rhythm at 77 "bpm   Normal axis and normal intervals  No acute ST elevation or depression  T wave inversion in 3  No changes compared to prior EKG 6/13/2021  Return in 6 weeks (on 6/5/2023), or if symptoms worsen or fail to improve, for with [facility] - F/U BPD, Fatigue, Labs; PRN Christopher Singh  Future Appointments   Date Time Provider Kelsie De La Rosa   6/5/2023 11:20 AM DO [facility]        Subjective:     [facility] is a 33 y o  female who presents today for a follow-up on her acute medical conditions  HPI:  Chief Complaint   Patient presents with   • Tingling     Bilateral arms, numbness  Started 2 weeks ago  • Insomnia     Started a week ago  • Hypothyroidism     Diagnosed a few years ago, does not take meds  • Fussy     Irritable, started about a month ago  • Anxiety     Discontinued meds, now doing therapy     -- Above per clinical staff and reviewed  --    HPI      Today:      PCP Dr Anup Gale is out of office  Thyroid Issues - H/O Hypothyroidism  She is not currently on Synthroid  She has been having tingling in her dorsal forearms and dorsal hands - occurs upon awakening, feels a burning sensation x 2 weeks, GERD, chest tenseness, insomnia, not feeling generally good  Euthyroid 7/22/20  Patient did not complete labs ordered by PCP Dr Anup Gale 5/24/22  Bipolar Disorder 2 with HypOmania - Was referred to Psych / IOP/Partial 10/20/22  She did not move forward with any of these recommendations as she wanted to see how she could function s meds  She feels like she is not doing great, but not \"in dire need  \"  Good social supports  No SI/HI/AH/VH  Last saw Christopher Singh 10/20/22  Prozac 40mg QD was D/C due to worsening bipolar symptoms  She is not taking Trazodone 50mg QD, Atarax 25mg q6 hours, Klonopin 0 5mg BID PRN anxiety as they had not been effective  She was previously on Buspar, Lexapro  Seh has not taken bipolar meds previously    She would like to see " if Psych Dr Perfecto Recio will see her since he sees her grandmother  She is considering returning the therapy c Felipa June  Feels safe at home  PHQ-2/9 Depression Screening    Little interest or pleasure in doing things: 1 - several days  Feeling down, depressed, or hopeless: 1 - several days  Trouble falling or staying asleep, or sleeping too much: 3 - nearly every day  Feeling tired or having little energy: 3 - nearly every day  Poor appetite or overeatin - several days  Feeling bad about yourself - or that you are a failure or have let yourself or your family down: 1 - several days  Trouble concentrating on things, such as reading the newspaper or watching television: 3 - nearly every day  Moving or speaking so slowly that other people could have noticed  Or the opposite - being so fidgety or restless that you have been moving around a lot more than usual: 0 - not at all  Thoughts that you would be better off dead, or of hurting yourself in some way: 0 - not at all  PHQ-9 Score: 13   PHQ-9 Interpretation: Moderate depression          BRANDY-7 Flowsheet Screening    Flowsheet Row Most Recent Value   Over the last 2 weeks, how often have you been bothered by any of the following problems? Feeling nervous, anxious, or on edge 3   Not being able to stop or control worrying 2   Worrying too much about different things 2   Trouble relaxing 2   Being so restless that it is hard to sit still 2   Becoming easily annoyed or irritable 3   Feeling afraid as if something awful might happen 2   BRANDY-7 Total Score 16        MDQ:  13, Synchronous, Serious Problem    Reviewed:  Labs 20    The following portions of the patient's history were reviewed and updated as appropriate: allergies, current medications, past family history, past medical history, past social history, past surgical history and problem list       Review of Systems   Constitutional: Positive for diaphoresis and fatigue   Negative for appetite change, "chills and fever  Respiratory: Negative for chest tightness and shortness of breath  Cardiovascular: Positive for chest pain (Constant x 1 week)  Gastrointestinal: Negative for abdominal pain, blood in stool, diarrhea, nausea and vomiting  Genitourinary: Negative for dysuria  Current Outpatient Medications   Medication Sig Dispense Refill   • ascorbic acid (VITAMIN C) 500 MG tablet Take 500 mg by mouth daily     • Multiple Vitamin (multivitamin) tablet Take 1 tablet by mouth daily     • norethindrone-ethinyl estradiol-ferrous fumarate (LOESTIN 24 FE) 1-20 MG-MCG(24) per tablet Take 1 tablet by mouth daily     • QUEtiapine (SEROquel) 100 mg tablet Increase dose nightly - 1/2, 1, 2, 3 pills by mouth nightly and continue  90 tablet 0   • rizatriptan (Maxalt-MLT) 5 mg disintegrating tablet Take 1 tablet (5 mg total) by mouth as needed for migraine Take at the onset of migraine; if symptoms continue or return, may take another dose at least 2 hours after first dose  Take no more than 2 doses in a day  9 tablet 1   • spironolactone (ALDACTONE) 100 mg tablet Take 100 mg by mouth 2 (two) times a day     • clonazePAM (KlonoPIN) 0 5 mg tablet Take 1 tablet (0 5 mg total) by mouth 2 (two) times a day as needed for anxiety (Patient not taking: Reported on 4/24/2023) 30 tablet 0   • hydrOXYzine HCL (ATARAX) 25 mg tablet Take 1 tablet (25 mg total) by mouth every 6 (six) hours as needed for itching (Patient not taking: Reported on 4/24/2023) 30 tablet 0   • traZODone (DESYREL) 50 mg tablet Take 1 tablet (50 mg total) by mouth daily at bedtime (Patient not taking: Reported on 4/24/2023) 90 tablet 0     No current facility-administered medications for this visit         Objective:  /86 (BP Location: Left arm, Patient Position: Sitting, Cuff Size: Large)   Pulse 100   Temp 97 5 °F (36 4 °C) (Temporal)   Resp 16   Ht 5' 6\" (1 676 m)   Wt 106 kg (232 lb 12 8 oz)   SpO2 98%   BMI 37 57 kg/m²    Wt Readings " from Last 3 Encounters:   04/24/23 106 kg (232 lb 12 8 oz)   10/20/22 103 kg (227 lb 9 6 oz)   07/07/22 102 kg (224 lb 12 8 oz)      BP Readings from Last 3 Encounters:   04/24/23 116/86   12/14/22 124/77   10/20/22 112/78          Physical Exam  Vitals and nursing note reviewed  Constitutional:       Appearance: Normal appearance  She is well-developed  She is obese  HENT:      Head: Normocephalic and atraumatic  Eyes:      Conjunctiva/sclera: Conjunctivae normal    Neck:      Thyroid: No thyromegaly  Cardiovascular:      Rate and Rhythm: Normal rate and regular rhythm  Pulses: Normal pulses  Heart sounds: Normal heart sounds  Pulmonary:      Effort: Pulmonary effort is normal       Breath sounds: Normal breath sounds  Chest:      Chest wall: No tenderness (B/L inferior rib cage)  Abdominal:      General: Bowel sounds are normal  There is no distension  Palpations: Abdomen is soft  There is no mass  Tenderness: There is no abdominal tenderness  There is no guarding or rebound  Musculoskeletal:         General: No swelling or tenderness  Cervical back: Neck supple  Right lower leg: No edema  Left lower leg: No edema  Lymphadenopathy:      Cervical: No cervical adenopathy  Neurological:      General: No focal deficit present  Mental Status: She is alert  Cranial Nerves: No cranial nerve deficit  Sensory: No sensory deficit  Motor: No weakness  Coordination: Coordination normal       Gait: Gait normal       Deep Tendon Reflexes: Reflexes normal    Psychiatric:         Attention and Perception: Attention and perception normal          Mood and Affect: Mood is depressed  Speech: Speech normal          Behavior: Behavior normal  Behavior is cooperative  Thought Content:  Thought content normal          Cognition and Memory: Cognition and memory normal          Judgment: Judgment normal          Lab Results:      Lab Results Component Value Date    WBC 8 87 06/13/2021    HGB 13 9 06/13/2021    HCT 41 4 06/13/2021     06/13/2021    TRIG 67 07/22/2020    HDL 55 07/22/2020    ALT 28 06/13/2021    AST 19 06/13/2021    K 4 1 06/13/2021     06/13/2021    CREATININE 0 78 06/13/2021    BUN 10 06/13/2021    CO2 31 06/13/2021    INR 1 0 06/14/2021     No results found for: URICACID  Invalid input(s): BASENAME Vitamin D    No results found  POCT Labs      BMI Counseling: Body mass index is 37 57 kg/m²  The BMI is above normal  Nutrition recommendations include encouraging healthy choices of fruits and vegetables  Exercise recommendations include exercising 3-5 times per week  No pharmacotherapy was ordered  Rationale for BMI follow-up plan is due to patient being overweight or obese  BMI Counseling: Body mass index is 37 57 kg/m²  The BMI is above normal  Nutrition recommendations include 3-5 servings of fruits/vegetables daily  Exercise recommendations include exercising 3-5 times per week

## 2023-04-24 NOTE — PATIENT INSTRUCTIONS
Obesity   AMBULATORY CARE:   Obesity  means your body mass index (BMI) is greater than 30  Your healthcare provider will use your age, height, and weight to measure your BMI  The risks of obesity include  many health problems, including injuries or physical disability  • Diabetes (high blood sugar level)    • High blood pressure or high cholesterol    • Heart disease    • Stroke    • Gallbladder or liver disease    • Cancer of the colon, breast, prostate, liver, or kidney    • Sleep apnea    • Arthritis or gout    Screening  is done to check for health conditions before you have signs or symptoms  If you are 28to 79years old, your blood sugar level may be checked every 3 years for signs of prediabetes or diabetes  Your healthcare provider will check your blood pressure at each visit  High blood pressure can lead to a stroke or other problems  Your provider may check for signs of heart disease, cancer, or other health problems  Seek care immediately if:   • You have a severe headache, confusion, or difficulty speaking  • You have weakness on one side of your body  • You have chest pain, sweating, or shortness of breath  Call your doctor if:   • You have symptoms of gallbladder or liver disease, such as pain in your upper abdomen  • You have knee or hip pain and discomfort while walking  • You have symptoms of diabetes, such as intense hunger and thirst, and frequent urination  • You have symptoms of sleep apnea, such as snoring or daytime sleepiness  • You have questions or concerns about your condition or care  Treatment for obesity  focuses on helping you lose weight to improve your health  Even a small decrease in BMI can reduce the risk for many health problems  Your healthcare provider will help you set a weight-loss goal   • Lifestyle changes  are the first step in treating obesity  These include making healthy food choices and getting regular physical activity   Your healthcare provider may suggest a weight-loss program that involves coaching, education, and therapy  • Medicine  may help you lose weight when it is used with a healthy foods and physical activity  • Surgery  can help you lose weight if you have obesity along with other health problems  Several types of weight-loss surgery are available  Ask your healthcare provider for more information  Tips for safe weight loss:   • Set small, realistic goals  An example of a small goal is to walk for 20 minutes 5 days a week  Anther goal is to lose 5% of your body weight  • Ask for support  Tell friends, family members, and coworkers about your goals  Ask someone to lose weight with you  You may also want to join a weight-loss support group  • Identify foods or triggers that may cause you to overeat  Remove tempting high-calorie foods from your home and workplace  Place a bowl of fresh fruit on your kitchen counter  If stress causes you to eat, find other ways to cope with stress  A counselor or therapist may be able to help you  • Track your daily calories and activity  Write down what you eat and drink  Also write down how many minutes of physical activity you do each day  • Track your weekly weight  Weigh yourself in the morning, before you eat or drink anything but after you use the bathroom  Use the same scale, in the same place, and in similar clothing each time  Only weigh yourself 1 to 2 times each week, or as directed  You may become discouraged if you weigh yourself every day  Eating changes: You will need to eat 500 to 1,000 fewer calories each day than you currently eat to lose 1 to 2 pounds a week  The following changes will help you cut calories:  • Eat smaller portions  Use small plates, no larger than 9 inches in diameter  Fill your plate half full of fruits and vegetables  Measure your food using measuring cups until you know what a serving size looks like           • Eat 3 meals and 1 or 2 snacks each day  Plan your meals in advance  Karen Hides and eat at home most of the time  Eat slowly  Do not skip meals  Skipping meals can lead to overeating later in the day  This can make it harder for you to lose weight  Talk with a dietitian to help you make a meal plan and schedule that is right for you  • Eat fruits and vegetables at every meal   They are low in calories and high in fiber, which makes you feel full  Do not add butter, margarine, or cream sauce to vegetables  Use herbs to season steamed vegetables  • Eat less fat and fewer fried foods  Eat more baked or grilled chicken and fish  These protein sources are lower in calories and fat than red meat  Limit fast food  Dress your salads with olive oil and vinegar instead of bottled dressing  • Limit the amount of sugar you eat  Do not drink sugary beverages  Limit alcohol  Activity changes:  Physical activity is good for your body in many ways  It helps you burn calories and build strong muscles  It decreases stress and depression, and improves your mood  It can also help you sleep better  Talk to your healthcare provider before you begin an exercise program   • Exercise for at least 30 minutes 5 days a week  Start slowly  Set aside time each day for physical activity that you enjoy and that is convenient for you  It is best to do both weight training and an activity that increases your heart rate, such as walking, bicycling, or swimming  • Find ways to be more active  Do yard work and housecleaning  Walk up the stairs instead of using elevators  Spend your leisure time going to events that require walking, such as outdoor festivals or fairs  This extra physical activity can help you lose weight and keep it off  Follow up with your doctor as directed: You may need to meet with a dietitian  Write down your questions so you remember to ask them during your visits    © Copyright [employer] 2022 Information is for End User's use only and may not be sold, redistributed or otherwise used for commercial purposes  The above information is an  only  It is not intended as medical advice for individual conditions or treatments  Talk to your doctor, nurse or pharmacist before following any medical regimen to see if it is safe and effective for you

## 2023-04-24 NOTE — ASSESSMENT & PLAN NOTE
Uncontrolled  Patient is considering scheduling appointment with Psych Dr Kelechi Maradiaga and reconnecting with counselor Molly Reason, LCSW  Start Seroquel 300mg QHS  Declines partial inpatient psych

## 2023-05-16 DIAGNOSIS — F33.1 MODERATE EPISODE OF RECURRENT MAJOR DEPRESSIVE DISORDER (HCC): ICD-10-CM

## 2023-05-16 DIAGNOSIS — F41.9 ANXIETY: ICD-10-CM

## 2023-05-16 DIAGNOSIS — F31.81 BIPOLAR 2 DISORDER (HCC): ICD-10-CM

## 2023-05-16 RX ORDER — QUETIAPINE FUMARATE 300 MG/1
300 TABLET, FILM COATED ORAL
Qty: 30 TABLET | Refills: 0 | Status: SHIPPED | OUTPATIENT
Start: 2023-05-16 | End: 2023-05-22

## 2023-05-22 ENCOUNTER — TELEPHONE (OUTPATIENT)
Dept: FAMILY MEDICINE CLINIC | Facility: CLINIC | Age: 28
End: 2023-05-22

## 2023-05-22 ENCOUNTER — TELEPHONE (OUTPATIENT)
Dept: OBGYN CLINIC | Facility: HOSPITAL | Age: 28
End: 2023-05-22

## 2023-05-22 DIAGNOSIS — F41.9 ANXIETY: ICD-10-CM

## 2023-05-22 DIAGNOSIS — F33.1 MODERATE EPISODE OF RECURRENT MAJOR DEPRESSIVE DISORDER (HCC): ICD-10-CM

## 2023-05-22 DIAGNOSIS — F31.81 BIPOLAR 2 DISORDER (HCC): Primary | ICD-10-CM

## 2023-05-22 RX ORDER — ARIPIPRAZOLE 15 MG/1
15 TABLET ORAL DAILY
Qty: 30 TABLET | Refills: 1 | Status: SHIPPED | OUTPATIENT
Start: 2023-05-22

## 2023-05-22 NOTE — TELEPHONE ENCOUNTER
When is her appt with Psych Dr Seymour Scott? D/C Seroquel  Start Abilify 15mg QHS instead  Pt should complete 4/24/23 1 month after starting Abilify  R/S 6/5/23 appt to 6 weeks after starting Abilify  Return in 6 weeks (on 6/5/2023), or if symptoms worsen or fail to improve, for with [facility] - F/U BPD, Fatigue, Labs; PRN Ade Achilles

## 2023-05-22 NOTE — TELEPHONE ENCOUNTER
Patient called the office states that she started The Seroquel    300 mg once a day states that she is not sleeping and have ton of anxiety  Patient states that has been going on for about week now   Patient states its only getting worse

## 2023-05-22 NOTE — TELEPHONE ENCOUNTER
Caller: patient    Doctor/Office: family medicine    Call regarding :  Family medicine     Call was transferred to: family medicine

## 2023-05-23 NOTE — TELEPHONE ENCOUNTER
Called patient, instructions relayed  Patient states she is working on scheduling an appointment with Dr Belkis Padilla; she is currently on a waiting list  Patient will complete labs prior

## 2023-06-13 DIAGNOSIS — F41.9 ANXIETY: ICD-10-CM

## 2023-06-13 DIAGNOSIS — F31.81 BIPOLAR 2 DISORDER (HCC): ICD-10-CM

## 2023-06-13 DIAGNOSIS — F33.1 MODERATE EPISODE OF RECURRENT MAJOR DEPRESSIVE DISORDER (HCC): ICD-10-CM

## 2023-06-13 NOTE — TELEPHONE ENCOUNTER
Please see if pt has an appointment with psychiatrist Dr Kacey Linares yet  How is she feeling on the abilify?   If doing well, I will refill #[phone], but would like her to see psychiatrist for next refill   (please also clarify that she is not taking clonazepam, hydroxyzine, or trazodone- they are marked not taking on chart, I can remove from list if she is off of these)

## 2023-06-19 RX ORDER — ARIPIPRAZOLE 15 MG/1
15 TABLET ORAL DAILY
Qty: 90 TABLET | Refills: 1 | OUTPATIENT
Start: 2023-06-19

## 2025-04-09 ENCOUNTER — APPOINTMENT (OUTPATIENT)
Dept: LAB | Facility: CLINIC | Age: 30
End: 2025-04-09

## 2025-04-09 ENCOUNTER — OCCMED (OUTPATIENT)
Dept: URGENT CARE | Facility: CLINIC | Age: 30
End: 2025-04-09

## 2025-04-09 DIAGNOSIS — Z02.1 PRE-EMPLOYMENT HEALTH SCREENING EXAMINATION: Primary | ICD-10-CM

## 2025-04-09 DIAGNOSIS — Z02.1 PRE-EMPLOYMENT HEALTH SCREENING EXAMINATION: ICD-10-CM

## 2025-04-09 PROCEDURE — 36415 COLL VENOUS BLD VENIPUNCTURE: CPT

## 2025-04-09 PROCEDURE — 86762 RUBELLA ANTIBODY: CPT

## 2025-04-09 PROCEDURE — 86765 RUBEOLA ANTIBODY: CPT

## 2025-04-09 PROCEDURE — 86787 VARICELLA-ZOSTER ANTIBODY: CPT

## 2025-04-09 PROCEDURE — 86480 TB TEST CELL IMMUN MEASURE: CPT

## 2025-04-09 PROCEDURE — 86735 MUMPS ANTIBODY: CPT

## 2025-04-10 LAB
GAMMA INTERFERON BACKGROUND BLD IA-ACNC: 0.02 IU/ML
M TB IFN-G BLD-IMP: NEGATIVE
M TB IFN-G CD4+ BCKGRND COR BLD-ACNC: 0.02 IU/ML
M TB IFN-G CD4+ BCKGRND COR BLD-ACNC: 0.03 IU/ML
MEV IGG SER QL IA: 36.4 AU/ML
MEV IGG SER QL IA: POSITIVE
MITOGEN IGNF BCKGRD COR BLD-ACNC: 9.98 IU/ML
MUV IGG SER QL IA: >300 AU/ML
MUV IGG SER QL IA: POSITIVE
RUBV IGG SERPL IA-ACNC: 79.1 IU/ML
VZV IGG SER QL IA: 3.2 S/CO
VZV IGG SER QL IA: POSITIVE

## 2025-07-03 ENCOUNTER — OFFICE VISIT (OUTPATIENT)
Dept: URGENT CARE | Facility: CLINIC | Age: 30
End: 2025-07-03
Payer: COMMERCIAL

## 2025-07-03 VITALS
RESPIRATION RATE: 18 BRPM | DIASTOLIC BLOOD PRESSURE: 80 MMHG | HEIGHT: 66 IN | SYSTOLIC BLOOD PRESSURE: 125 MMHG | OXYGEN SATURATION: 99 % | TEMPERATURE: 97.8 F | WEIGHT: 190 LBS | HEART RATE: 74 BPM | BODY MASS INDEX: 30.53 KG/M2

## 2025-07-03 DIAGNOSIS — R10.9 ABDOMINAL CRAMPING: Primary | ICD-10-CM

## 2025-07-03 LAB
SL AMB  POCT GLUCOSE, UA: NORMAL
SL AMB LEUKOCYTE ESTERASE,UA: NORMAL
SL AMB POCT BILIRUBIN,UA: NORMAL
SL AMB POCT BLOOD,UA: NORMAL
SL AMB POCT CLARITY,UA: CLEAR
SL AMB POCT COLOR,UA: NORMAL
SL AMB POCT KETONES,UA: NORMAL
SL AMB POCT NITRITE,UA: NORMAL
SL AMB POCT PH,UA: 5
SL AMB POCT SPECIFIC GRAVITY,UA: 1.01
SL AMB POCT URINE HCG: NORMAL
SL AMB POCT URINE PROTEIN: NORMAL
SL AMB POCT UROBILINOGEN: 0.2

## 2025-07-03 PROCEDURE — 81002 URINALYSIS NONAUTO W/O SCOPE: CPT

## 2025-07-03 PROCEDURE — 99213 OFFICE O/P EST LOW 20 MIN: CPT

## 2025-07-03 PROCEDURE — 81025 URINE PREGNANCY TEST: CPT

## 2025-07-03 NOTE — PATIENT INSTRUCTIONS
May take OTC Tylenol or Ibuprofen as needed for pain.    Please retest yourself for pregnancy in another few days.    If your symptoms do not improve with our current treatment plan or worsen, please schedule an appointment with your PCP/OBGYN. If you develop any high or persistent fevers, chest pain, palpitations, shortness of breath, difficulty swallowing, decreased urine output, increasing abdominal pain, dizziness or any other concerning symptoms, please proceed to the ED.

## 2025-07-03 NOTE — PROGRESS NOTES
[facility]  Name: Rina Brewster      : 1995      MRN: [id number]  Encounter Provider: KOURTNEY Awan  Encounter Date: 7/3/2025   Encounter department: [facility] [place]  :  Assessment & Plan  Abdominal cramping    Orders:  •  POCT urine dip  •  POCT urine HCG    Urine dip in office negative.  Urine pregnancy also negative. Physical examination unremarkable.  Discussed with patient symptoms can be related to ovulation pain, ovarian cyst, and/or possible early pregnancy.  Instructed patient to retest for pregnancy in another few days.  Instructed patient to follow-up with PCP/OBGYN for no improvement or worsening of symptoms.  Educated patient on red flag signs and when to proceed to the ED.  Patient agreeable and understands current treatment plan.      Patient Instructions  Follow up with PCP in 3-5 days.  Proceed to  ER if symptoms worsen.    If tests are performed, our office will contact you with results only if changes need to made to the care plan discussed with you at the visit. [facility]    Chief Complaint:   Chief Complaint   Patient presents with   • Abdominal Pain     Lower abdominal  cramping and bloating for 4-5 days, ran out of birth control in May, using precautions, took 2 pregnancy tests and both were negative     History of Present Illness   30-year-old female presents to the clinic for evaluation of abdominal cramping x 4 days.  Patient reports the abdominal cramping is located in her bilateral lower abdomen.  She reports it is mild in nature and denies any radiation of pain.  She reports she does feel bloated and she has also had some sore and tender nipples.  Patient does states she has a history of PCOS and she has irregular periods.  She does report she came off of birth control last month and has been having unprotected sex with her significant other.  She states she has taken 2 pregnancy tests 1 yesterday  and today which both were negative.  Patient denies any other symptoms at this time including fevers, chills, sinus congestion, ear pain, sore throat, cough, shortness of breath, chest pain, palpitations, nausea, vomiting, diarrhea, constipation, abdominal distention, urinary symptoms, body aches, dizziness or headaches.  She reports she has been taking OTC Tylenol which has not made a difference in symptoms.          Abdominal Pain  Pertinent negatives include no arthralgias, constipation, diarrhea, dysuria, fever, frequency, headaches, hematuria, myalgias, nausea or vomiting.         Review of Systems   Constitutional:  Negative for chills and fever.   HENT:  Negative for congestion, ear pain and sore throat.    Respiratory:  Negative for cough and shortness of breath.    Cardiovascular:  Negative for chest pain and palpitations.   Gastrointestinal:  Positive for abdominal pain (cramping). Negative for abdominal distention, blood in stool, constipation, diarrhea, nausea and vomiting.   Genitourinary:  Negative for dysuria, frequency, hematuria and urgency.   Musculoskeletal:  Negative for arthralgias and myalgias.        Sore nipples   Neurological:  Negative for dizziness and headaches.   All other systems reviewed and are negative.    Past Medical History   Past Medical History[1]  Past Surgical History[2]  Family History[3]  she reports that she quit smoking about 13 years ago. Her smoking use included cigarettes. She started smoking about 16 years ago. She has never used smokeless tobacco. She reports current alcohol use. She reports that she does not use drugs.  Current Outpatient Medications   Medication Instructions   • ARIPiprazole (ABILIFY) 15 mg, Oral, Daily   • ascorbic acid (VITAMIN C) 500 mg, Daily   • clonazePAM (KLONOPIN) 0.5 mg, Oral, 2 times daily PRN   • hydrOXYzine HCL (ATARAX) 25 mg, Oral, Every 6 hours PRN   • Multiple Vitamin (multivitamin) tablet 1 tablet, Daily   • norethindrone-ethinyl  "estradiol-ferrous fumarate (LOESTIN 24 FE) 1-20 MG-MCG(24) per tablet 1 tablet, Daily   • rizatriptan (MAXALT-MLT) 5 mg, Oral, As needed, Take at the onset of migraine; if symptoms continue or return, may take another dose at least 2 hours after first dose. Take no more than 2 doses in a day.   • spironolactone (ALDACTONE) 100 mg, 2 times daily   • traZODone (DESYREL) 50 mg, Oral, Daily at bedtime   Allergies[4]     Objective   /80   Pulse 74   Temp 97.8 °F (36.6 °C) (Temporal)   Resp 18   Ht 5' 6\" (1.676 m)   Wt 86.2 kg (190 lb)   SpO2 99%   BMI 30.67 kg/m²      Physical Exam  Vitals and nursing note reviewed.   Constitutional:       Appearance: Normal appearance.   HENT:      Head: Normocephalic and atraumatic.     Cardiovascular:      Rate and Rhythm: Normal rate and regular rhythm.      Heart sounds: Normal heart sounds.   Pulmonary:      Effort: Pulmonary effort is normal.      Breath sounds: Normal breath sounds.   Abdominal:      General: Abdomen is flat. Bowel sounds are normal. There is no distension.      Palpations: Abdomen is soft. There is no mass.      Tenderness: There is no abdominal tenderness. There is no right CVA tenderness, left CVA tenderness, guarding or rebound.      Comments: Abdomen is soft, nondistended and nontender to palpation; bowel sounds normal active in all 4 quadrants     Skin:     General: Skin is warm and dry.     Neurological:      General: No focal deficit present.      Mental Status: She is alert and oriented to person, place, and time.     Psychiatric:         Mood and Affect: Mood normal.         Behavior: Behavior normal.         Portions of the record may have been created with voice recognition software.  Occasional wrong word or \"sound a like\" substitutions may have occurred due to the inherent limitations of voice recognition software.  Read the chart carefully and recognize, using context, where substitutions have occurred.         [1]  Past Medical " History:  Diagnosis Date   • Allergic    • Anxiety    • Depression     Last assessed 6/5/2013   • Disease of thyroid gland    • Eating disorder    • GERD (gastroesophageal reflux disease)    [2]  Past Surgical History:  Procedure Laterality Date   • CHOLECYSTECTOMY     • CHOLECYSTECTOMY LAPAROSCOPIC N/A 5/18/2018    Procedure: CHOLECYSTECTOMY LAPAROSCOPIC;  Surgeon: David Mtz MD;  Location: [facility];  Service: General   • TONSILLECTOMY      With Adenoidectomy   [3]  Family History  Problem Relation Name Age of Onset   • Anxiety disorder Mother N/A    • Hypertension Father N/A    • Thyroid cancer Father N/A    • Cancer Father N/A    • No Known Problems Brother     • Suicide Attempts Paternal Grandmother N/A         suicide attempts started age 40s. again age 70. ECT   • Alcohol abuse Paternal Grandmother N/A    • Depression Paternal Grandmother N/A    • Mental illness Paternal Grandmother N/A    • Dementia Paternal Grandmother N/A    • Cancer Paternal Grandmother N/A    • Anxiety disorder Paternal Grandmother N/A    • Psychiatric Illness Paternal Grandmother N/A    • Psychosis Paternal Grandmother N/A    • Suicide Attempts Paternal Uncle N/A         death by suicide   • Mental illness Paternal Uncle N/A    • Completed Suicide  Paternal Uncle N/A    • Depression Paternal Uncle N/A    • Anxiety disorder Paternal Uncle N/A    • Psychiatric Illness Paternal Uncle N/A    • Alcohol abuse Maternal Grandfather N/A    • Dementia Maternal Grandfather N/A    • Substance Abuse Neg Hx     [4]  Allergies  Allergen Reactions   • Other Allergic Rhinitis     Category: Seasonal allergies;

## 2025-07-17 RX ORDER — LIDOCAINE 50 MG/G
1 PATCH TOPICAL EVERY 24 HOURS
COMMUNITY
Start: 2024-09-27 | End: 2025-09-27

## 2025-07-18 ENCOUNTER — OFFICE VISIT (OUTPATIENT)
Dept: FAMILY MEDICINE CLINIC | Facility: CLINIC | Age: 30
End: 2025-07-18
Payer: COMMERCIAL

## 2025-07-18 VITALS
DIASTOLIC BLOOD PRESSURE: 76 MMHG | RESPIRATION RATE: 16 BRPM | BODY MASS INDEX: 29.89 KG/M2 | HEART RATE: 86 BPM | OXYGEN SATURATION: 98 % | WEIGHT: 186 LBS | TEMPERATURE: 98.6 F | HEIGHT: 66 IN | SYSTOLIC BLOOD PRESSURE: 128 MMHG

## 2025-07-18 DIAGNOSIS — Z00.00 ANNUAL PHYSICAL EXAM: Primary | ICD-10-CM

## 2025-07-18 DIAGNOSIS — G47.00 INSOMNIA, UNSPECIFIED TYPE: ICD-10-CM

## 2025-07-18 DIAGNOSIS — L80 VITILIGO: ICD-10-CM

## 2025-07-18 DIAGNOSIS — Z76.89 ENCOUNTER TO ESTABLISH CARE WITH NEW DOCTOR: ICD-10-CM

## 2025-07-18 DIAGNOSIS — E28.2 PCOS (POLYCYSTIC OVARIAN SYNDROME): ICD-10-CM

## 2025-07-18 DIAGNOSIS — R53.83 OTHER FATIGUE: ICD-10-CM

## 2025-07-18 DIAGNOSIS — E66.9 OBESITY (BMI 30-39.9): ICD-10-CM

## 2025-07-18 PROCEDURE — 99395 PREV VISIT EST AGE 18-39: CPT | Performed by: NURSE PRACTITIONER

## 2025-07-18 PROCEDURE — 99214 OFFICE O/P EST MOD 30 MIN: CPT | Performed by: NURSE PRACTITIONER

## 2025-07-18 RX ORDER — TRAZODONE HYDROCHLORIDE 50 MG/1
50 TABLET ORAL
Qty: 30 TABLET | Refills: 0 | Status: SHIPPED | OUTPATIENT
Start: 2025-07-18

## 2025-07-18 NOTE — PATIENT INSTRUCTIONS
"Patient Education     Routine physical for adults   The Basics   Written by the doctors and editors at [facility]   What is a physical? -- A physical is a routine visit, or \"check-up,\" with your doctor. You might also hear it called a \"wellness visit\" or \"preventive visit.\"  During each visit, the doctor will:   Ask about your physical and mental health   Ask about your habits, behaviors, and lifestyle   Do an exam   Give you vaccines if needed   Talk to you about any medicines you take   Give advice about your health   Answer your questions  Getting regular check-ups is an important part of taking care of your health. It can help your doctor find and treat any problems you have. But it's also important for preventing health problems.  A routine physical is different from a \"sick visit.\" A sick visit is when you see a doctor because of a health concern or problem. Since physicals are scheduled ahead of time, you can think about what you want to ask the doctor.  How often should I get a physical? -- It depends on your age and health. In general, for people age 21 years and older:   If you are younger than 50 years, you might be able to get a physical every 3 years.   If you are 50 years or older, your doctor might recommend a physical every year.  If you have an ongoing health condition, like diabetes or high blood pressure, your doctor will probably want to see you more often.  What happens during a physical? -- In general, each visit will include:   Physical exam - The doctor or nurse will check your height, weight, heart rate, and blood pressure. They will also look at your eyes and ears. They will ask about how you are feeling and whether you have any symptoms that bother you.   Medicines - It's a good idea to bring a list of all the medicines you take to each doctor visit. Your doctor will talk to you about your medicines and answer any questions. Tell them if you are having any side effects that bother you. You " "should also tell them if you are having trouble paying for any of your medicines.   Habits and behaviors - This includes:   Your diet   Your exercise habits   Whether you smoke, drink alcohol, or use drugs   Whether you are sexually active   Whether you feel safe at home  Your doctor will talk to you about things you can do to improve your health and lower your risk of health problems. They will also offer help and support. For example, if you want to quit smoking, they can give you advice and might prescribe medicines. If you want to improve your diet or get more physical activity, they can help you with this, too.   Lab tests, if needed - The tests you get will depend on your age and situation. For example, your doctor might want to check your:   Cholesterol   Blood sugar   Iron level   Vaccines - The recommended vaccines will depend on your age, health, and what vaccines you already had. Vaccines are very important because they can prevent certain serious or deadly infections.   Discussion of screening - \"Screening\" means checking for diseases or other health problems before they cause symptoms. Your doctor can recommend screening based on your age, risk, and preferences. This might include tests to check for:   Cancer, such as breast, prostate, cervical, ovarian, colorectal, prostate, lung, or skin cancer   Sexually transmitted infections, such as chlamydia and gonorrhea   Mental health conditions like depression and anxiety  Your doctor will talk to you about the different types of screening tests. They can help you decide which screenings to have. They can also explain what the results might mean.   Answering questions - The physical is a good time to ask the doctor or nurse questions about your health. If needed, they can refer you to other doctors or specialists, too.  Adults older than 65 years often need other care, too. As you get older, your doctor will talk to you about:   How to prevent falling at " home   Hearing or vision tests   Memory testing   How to take your medicines safely   Making sure that you have the help and support you need at home  All topics are updated as new evidence becomes available and our peer review process is complete.  This topic retrieved from [employer] on: May 02, 2024.  Topic 158305 Version 1.0  Release: 32.4.3 - C32.122  © 2024 UpToDate, Inc. and/or its affiliates. All rights reserved.  Consumer Information Use and Disclaimer   Disclaimer: This generalized information is a limited summary of diagnosis, treatment, and/or medication information. It is not meant to be comprehensive and should be used as a tool to help the user understand and/or assess potential diagnostic and treatment options. It does NOT include all information about conditions, treatments, medications, side effects, or risks that may apply to a specific patient. It is not intended to be medical advice or a substitute for the medical advice, diagnosis, or treatment of a health care provider based on the health care provider's examination and assessment of a patient's specific and unique circumstances. Patients must speak with a health care provider for complete information about their health, medical questions, and treatment options, including any risks or benefits regarding use of medications. This information does not endorse any treatments or medications as safe, effective, or approved for treating a specific patient. UpToDate, Inc. and its affiliates disclaim any warranty or liability relating to this information or the use thereof.The use of this information is governed by the Terms of Use, available at https://www.[employer]heath Holdingsuwer.com/en/know/clinical-effectiveness-terms. 2024© UpToDate, Inc. and its affiliates and/or licensors. All rights reserved.  Copyright   © 2024 UpToDate, Inc. and/or its affiliates. All rights reserved.

## 2025-07-18 NOTE — ASSESSMENT & PLAN NOTE
Orders:    norethindrone-ethinyl estradiol-ferrous fumarate (LOESTIN 24 FE) 1-20 MG-MCG(24) per tablet; Take 1 tablet by mouth daily

## 2025-07-18 NOTE — PROGRESS NOTES
Adult Annual Physical  Name: Rina Brewster      : 1995      MRN: [id number]  Encounter Provider: KOURTNEY Alonzo  Encounter Date: 2025   Encounter department: [facility]    :  Assessment & Plan  Annual physical exam         PCOS (polycystic ovarian syndrome)    Orders:    norethindrone-ethinyl estradiol-ferrous fumarate (LOESTIN 24 FE) 1-20 MG-MCG(24) per tablet; Take 1 tablet by mouth daily    Insomnia, unspecified type    Orders:    traZODone (DESYREL) 50 mg tablet; Take 1 tablet (50 mg total) by mouth daily at bedtime    Other fatigue    Orders:    Iron Panel (Includes Ferritin, Iron Sat%, Iron, and TIBC); Future    Vitamin B12; Future    Vitamin D 25 hydroxy; Future    Vitiligo    Orders:    Ambulatory Referral to Dermatology; Future    Encounter to establish care with new doctor         Obesity (BMI 30-39.9)               Annual Physical Plan       History of Present Illness     Adult Annual Physical:  Patient presents for annual physical. 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 Her highest weight 240 she is super focused on weight and exercise is fluctuating between 185-195  Has facial and body hair that is changing she is self conscious   She has done laser treatment for 1 year and did come back   [facility] previously   And saw endo     She did have a sunburn last year but has lost pigment in the area on her back and chest possible vitiligo      .     Diet and Physical Activity:  - Diet/Nutrition: well balanced diet. does not eat out and is a healthy eater  - Exercise: 5-7 times a week on average.    Depression Screening:    - PHQ-9 Score: 2    General Health:  - Sleep: sleeps poorly and 4-6 hours of sleep on average. restlessness  - Hearing: normal hearing bilateral ears.  - Vision: vision problems, wears glasses, wears contacts and most recent eye  "exam > 1 year ago.  - Dental: regular dental visits, brushes teeth twice daily and floss regularly.    /GYN Health:  - Follows with GYN: yes.     Review of Systems   Constitutional:  Negative for chills and fever.   HENT:  Negative for ear pain and sore throat.    Eyes:  Negative for pain and visual disturbance.   Respiratory:  Negative for cough and shortness of breath.    Cardiovascular:  Negative for chest pain and palpitations.   Gastrointestinal:  Negative for abdominal pain and vomiting.   Genitourinary:  Negative for dysuria and hematuria.   Musculoskeletal:  Negative for arthralgias and back pain.   Skin:  Positive for color change. Negative for rash.   Neurological:  Negative for seizures and syncope.   Psychiatric/Behavioral:  Positive for sleep disturbance.    All other systems reviewed and are negative.        Objective   /76   Pulse 86   Temp 98.6 °F (37 °C) (Temporal)   Resp 16   Ht 5' 6\" (1.676 m)   Wt 84.4 kg (186 lb)   SpO2 98%   BMI 30.02 kg/m²     Physical Exam  Vitals and nursing note reviewed.   Constitutional:       General: She is not in acute distress.     Appearance: Normal appearance. She is well-developed.   HENT:      Head: Normocephalic and atraumatic.      Nose: Nose normal.      Mouth/Throat:      Mouth: Mucous membranes are moist.     Eyes:      Conjunctiva/sclera: Conjunctivae normal.       Cardiovascular:      Rate and Rhythm: Normal rate and regular rhythm.      Heart sounds: No murmur heard.  Pulmonary:      Effort: Pulmonary effort is normal. No respiratory distress.      Breath sounds: Normal breath sounds.   Abdominal:      Palpations: Abdomen is soft.      Tenderness: There is no abdominal tenderness.     Musculoskeletal:         General: No swelling.      Cervical back: Neck supple.     Skin:     General: Skin is warm and dry.      Capillary Refill: Capillary refill takes less than 2 seconds.      Comments: Positive color change, lack of pigment to the back and " chest      Neurological:      Mental Status: She is alert and oriented to person, place, and time.     Psychiatric:         Mood and Affect: Mood normal.         Behavior: Behavior normal.         Thought Content: Thought content normal.         Judgment: Judgment normal.

## 2025-07-21 ENCOUNTER — PATIENT MESSAGE (OUTPATIENT)
Dept: FAMILY MEDICINE CLINIC | Facility: CLINIC | Age: 30
End: 2025-07-21

## 2025-07-21 DIAGNOSIS — E28.2 PCOS (POLYCYSTIC OVARIAN SYNDROME): ICD-10-CM

## 2025-07-28 ENCOUNTER — TELEPHONE (OUTPATIENT)
Age: 30
End: 2025-07-28

## 2025-07-28 DIAGNOSIS — E66.09 OBESITY DUE TO EXCESS CALORIES WITHOUT SERIOUS COMORBIDITY, UNSPECIFIED CLASS: Primary | ICD-10-CM

## 2025-07-28 RX ORDER — TIRZEPATIDE 2.5 MG/.5ML
2.5 INJECTION, SOLUTION SUBCUTANEOUS WEEKLY
Qty: 2 ML | Refills: 0 | Status: SHIPPED | OUTPATIENT
Start: 2025-07-28 | End: 2025-08-25

## 2025-08-09 ENCOUNTER — APPOINTMENT (OUTPATIENT)
Dept: LAB | Facility: CLINIC | Age: 30
End: 2025-08-09
Attending: NURSE PRACTITIONER
Payer: COMMERCIAL

## (undated) DEVICE — SUT VICRYL 0 UR-6 27 IN J603H

## (undated) DEVICE — ENDOPATH XCEL UNIVERSAL TROCAR STABLILITY SLEEVES: Brand: ENDOPATH XCEL

## (undated) DEVICE — SPONGE 4 X 4 XRAY 16 PLY STRL LF RFD

## (undated) DEVICE — TUBING SMOKE EVAC W/FILTRATION DEVICE PLUMEPORT ACTIV

## (undated) DEVICE — ALLENTOWN LAP CHOLE APP PACK: Brand: CARDINAL HEALTH

## (undated) DEVICE — CHLORAPREP HI-LITE 26ML ORANGE

## (undated) DEVICE — IRRIG ENDO FLO TUBING

## (undated) DEVICE — ENDOPATH XCEL BLUNT TIP TROCARS WITH SMOOTH SLEEVES: Brand: ENDOPATH XCEL

## (undated) DEVICE — INTENDED FOR TISSUE SEPARATION, AND OTHER PROCEDURES THAT REQUIRE A SHARP SURGICAL BLADE TO PUNCTURE OR CUT.: Brand: BARD-PARKER SAFETY BLADES SIZE 11, STERILE

## (undated) DEVICE — ENDOPATH XCEL BLADELESS TROCARS WITH STABILITY SLEEVES: Brand: ENDOPATH XCEL

## (undated) DEVICE — GLOVE SRG BIOGEL 7.5

## (undated) DEVICE — GLOVE INDICATOR PI UNDERGLOVE SZ 6.5 BLUE

## (undated) DEVICE — ADHESIVE SKN CLSR HISTOACRYL FLEX 0.5ML LF

## (undated) DEVICE — GLOVE SRG BIOGEL ECLIPSE 8

## (undated) DEVICE — LIGAMAX 5 MM ENDOSCOPIC MULTIPLE CLIP APPLIER: Brand: LIGAMAX

## (undated) DEVICE — ELECTRODE LAP L WIRE E-Z CLEAN 33CM -0100

## (undated) DEVICE — CONMED ACCESSORY ELECTRODE, FLAT BLADE WITH EXTENDED INSULATION: Brand: CONMED

## (undated) DEVICE — GLOVE SRG BIOGEL 6.5

## (undated) DEVICE — SUT MONOCRYL 4-0 PS-2 27 IN Y426H

## (undated) DEVICE — SCD SEQUENTIAL COMPRESSION COMFORT SLEEVE MEDIUM KNEE LENGTH: Brand: KENDALL SCD

## (undated) DEVICE — DRAPE EQUIPMENT RF WAND

## (undated) DEVICE — GLOVE INDICATOR PI UNDERGLOVE SZ 8 BLUE

## (undated) DEVICE — ENDOPOUCH RETRIEVER SPECIMEN RETRIEVAL BAGS: Brand: ENDOPOUCH RETRIEVER

## (undated) DEVICE — GLOVE INDICATOR PI UNDERGLOVE SZ 7.5 BLUE

## (undated) DEVICE — 2000CC GUARDIAN II: Brand: GUARDIAN

## (undated) DEVICE — BLUE HEAT SCOPE WARMER

## (undated) DEVICE — SPONGE LAP 18 X 18 IN STRL RFD

## (undated) DEVICE — VIAL DECANTER

## (undated) DEVICE — ENDOPATH 5MM CURVED SCISSORS WITH MONOPOLAR CAUTERY: Brand: ENDOPATH

## (undated) DEVICE — REM POLYHESIVE ADULT PATIENT RETURN ELECTRODE: Brand: VALLEYLAB